# Patient Record
Sex: FEMALE | Race: WHITE | Employment: UNEMPLOYED | URBAN - METROPOLITAN AREA
[De-identification: names, ages, dates, MRNs, and addresses within clinical notes are randomized per-mention and may not be internally consistent; named-entity substitution may affect disease eponyms.]

---

## 2021-01-13 ENCOUNTER — VIRTUAL VISIT (OUTPATIENT)
Dept: FAMILY MEDICINE | Facility: OTHER | Age: 26
End: 2021-01-13

## 2021-01-13 NOTE — PROGRESS NOTES
"Date: 2021 04:24:42  Clinician: Pebbles Whitaker  Clinician NPI: [id number]  Patient: Joey Spears  Patient : 1995  Patient Address: [address], [place] [address]  Patient Phone: [phone]  Visit Protocol: UTI  Patient Summary:  Joey is a 25 year old ( : 1995 ) female who initiated a [facility] Visit for a presumed bladder infection. When asked the question \"Please sign me up to receive news, health information and promotions from [facility].\", Joey responded \"No\".   Her symptoms started 1-3 days ago and consist of urinary frequency, urgency, dysuria, foul-smelling urine, and feeling as if the bladder is never empty.   Symptom details   Urine color: The color of her urine is yellow.    Denied symptoms include flank pain, abdominal pain, chills, urinary incontinence, vomiting, vaginal itching, nausea, and vaginal discharge. She does not feel feverish.   Joey has not used any over-the-counter medications or home remedies to relieve her current symptoms.  Precipitating events  Joey denies having a sexually transmitted disease.  Pertinent medical history  Joey has had a bladder infection before but has not had any in the past 12 months. Her current symptoms are similar to her previous bladder infection symptoms.   She is not sure what antibiotics have been effective in treating her past bladder infections.   Joey typically gets a yeast infection when she takes antibiotics. She is not sure if she has used fluconazole (Diflucan) to treat previous yeast infections.   Joey has not been prescribed antibiotics to prevent frequent or repeated bladder infections in the past. She has not experienced problems or side effects with any of the common antibiotics used to treat bladder infections.   Joey does not have a history of kidney stones. She does not have any other urologic conditions. Her provider has not told her she has advanced kidney disease. She has not used a catheter or " been a patient in a hospital or nursing home in the past 2 weeks.  She denies having immunosuppressive conditions (e.g., chemotherapy, HIV, organ transplant, long-term use of steroids or other immunosuppressive medications, splenectomy). She does not have diabetes.   Joey smokes or uses smokeless tobacco.   She denies pregnancy and denies breastfeeding. She has menstruated in the past month.     MEDICATIONS: No current medications, ALLERGIES: NKDA  Clinician Response:  Dear Joey,  Based on the information you have provided, you likely have an acute urinary tract infection, also called a bladder infection. Bladder infections occur when bacteria from the outside of the body enters the urinary tract. Any part of the urinary system can be infected, but the bladder is the most common.  Medication information  I am prescribing:     Sulfamethoxazole-trimethoprim (Bactrim DS) 800-160 mg oral tablet. Take 1 tablet by mouth every 12 hours for 3 days. There are no refills with this prescription.   Certain antibiotics such as Bactrim and Ciprofloxacin can cause your skin to be more sensitive to the sun. Exposure to the sun when taking these antibiotics may cause skin rash, itching, redness, or a severe sunburn. Be sure to avoid prolonged or direct exposure to the sun, especially between 10 am and 3 pm, if possible. Wear protective clothing and use sunscreen of SPF 30 or higher when you are outdoors.  The medication I prescribed for your bladder infection is an antibiotic. Continue taking the medication until it is gone even if you feel better. If you get an upset stomach while taking antibiotics, taking the medication with food can help.   Because you usually get a yeast infection when taking antibiotics, I am also prescribing:     Fluconazole (Diflucan) 150 mg oral tablet. Take 1 tablet by mouth 1 time a day for 1 day. There are no refills with this prescription.   Self care  Urination helps to flush bacteria from the  urinary tract. For this reason, drinking water and urinating often helps relieve some urinary symptoms and can decrease your risk of getting bladder infections in the future.  Other steps you can take to prevent future bladder infections include:     Wipe front to back after using the bathroom    Urinate after sexual intercourse    Avoid using deodorant sprays, douches, or powders in the vaginal area     Becoming tobacco-free is one of the best things you can do to improve your health. For help with quitting tobacco, you can call 7-130-QUIT-NOW (1-949.485.3818) or visit [employer].  When to seek care  Please make an appointment to be seen in a clinic or urgent care if any of the following occur:     You develop new symptoms or your symptoms become worse    You have medication side effects that make it difficult to take them as prescribed    Your symptoms do not improve within 1-2 days of starting treatment    You have symptoms of a bladder infection that return shortly after completing treatment     It is possible to have an allergic reaction to an antibiotic even if you have not had one in the past. If you notice a new rash, significant swelling, or difficulty breathing, stop taking this medication immediately and go to a clinic or urgent care.   Diagnosis: Acute uncomplicated bladder infection  Diagnosis ICD: N39.0  Prescription: sulfamethoxazole-trimethoprim (Bactrim DS) 800-160 mg oral tablet 6 tablet, 3 days supply. Take 1 tablet by mouth every 12 hours for 3 days. Refills: 0, Refill as needed: no, Allow substitutions: yes  Prescription: fluconazole (Diflucan) 150 mg oral tablet 1 tablet, 1 days supply. Take 1 tablet by mouth 1 time a day for 1 day. Refills: 0, Refill as needed: no, Allow substitutions: yes  Pharmacy: [id number] - [phone] - [address], [place] [address]

## 2021-01-15 ENCOUNTER — HEALTH MAINTENANCE LETTER (OUTPATIENT)
Age: 26
End: 2021-01-15

## 2021-02-05 ASSESSMENT — ENCOUNTER SYMPTOMS
HEMATURIA: 0
DYSURIA: 0
JOINT SWELLING: 0
NAUSEA: 0
PALPITATIONS: 0
NERVOUS/ANXIOUS: 1
SHORTNESS OF BREATH: 0
CHILLS: 0
FEVER: 0
HEADACHES: 1
HEMATOCHEZIA: 0
EYE PAIN: 0
MYALGIAS: 0
DIARRHEA: 0
SORE THROAT: 0
BREAST MASS: 0
PARESTHESIAS: 0
ABDOMINAL PAIN: 0
DIZZINESS: 0
HEARTBURN: 0
ARTHRALGIAS: 0
FREQUENCY: 0
COUGH: 0
WEAKNESS: 0
CONSTIPATION: 0

## 2021-02-05 ASSESSMENT — PATIENT HEALTH QUESTIONNAIRE - PHQ9
SUM OF ALL RESPONSES TO PHQ QUESTIONS 1-9: 18
SUM OF ALL RESPONSES TO PHQ QUESTIONS 1-9: 18
10. IF YOU CHECKED OFF ANY PROBLEMS, HOW DIFFICULT HAVE THESE PROBLEMS MADE IT FOR YOU TO DO YOUR WORK, TAKE CARE OF THINGS AT HOME, OR GET ALONG WITH OTHER PEOPLE: EXTREMELY DIFFICULT

## 2021-02-06 ASSESSMENT — PATIENT HEALTH QUESTIONNAIRE - PHQ9: SUM OF ALL RESPONSES TO PHQ QUESTIONS 1-9: 18

## 2021-02-08 ENCOUNTER — OFFICE VISIT (OUTPATIENT)
Dept: FAMILY MEDICINE | Facility: CLINIC | Age: 26
End: 2021-02-08
Payer: COMMERCIAL

## 2021-02-08 VITALS
TEMPERATURE: 98.3 F | WEIGHT: 155 LBS | BODY MASS INDEX: 26.46 KG/M2 | HEART RATE: 79 BPM | SYSTOLIC BLOOD PRESSURE: 106 MMHG | HEIGHT: 64 IN | DIASTOLIC BLOOD PRESSURE: 69 MMHG | OXYGEN SATURATION: 100 %

## 2021-02-08 DIAGNOSIS — Z00.00 ENCOUNTER FOR ROUTINE ADULT HEALTH EXAMINATION WITHOUT ABNORMAL FINDINGS: Primary | ICD-10-CM

## 2021-02-08 DIAGNOSIS — F41.9 ANXIETY: ICD-10-CM

## 2021-02-08 DIAGNOSIS — F32.A DEPRESSION, UNSPECIFIED DEPRESSION TYPE: ICD-10-CM

## 2021-02-08 PROCEDURE — G0145 SCR C/V CYTO,THINLAYER,RESCR: HCPCS | Performed by: FAMILY MEDICINE

## 2021-02-08 PROCEDURE — 90686 IIV4 VACC NO PRSV 0.5 ML IM: CPT | Performed by: FAMILY MEDICINE

## 2021-02-08 PROCEDURE — 86803 HEPATITIS C AB TEST: CPT | Performed by: FAMILY MEDICINE

## 2021-02-08 PROCEDURE — 99395 PREV VISIT EST AGE 18-39: CPT | Mod: 25 | Performed by: FAMILY MEDICINE

## 2021-02-08 PROCEDURE — 90471 IMMUNIZATION ADMIN: CPT | Performed by: FAMILY MEDICINE

## 2021-02-08 PROCEDURE — 36415 COLL VENOUS BLD VENIPUNCTURE: CPT | Performed by: FAMILY MEDICINE

## 2021-02-08 PROCEDURE — 99214 OFFICE O/P EST MOD 30 MIN: CPT | Mod: 25 | Performed by: FAMILY MEDICINE

## 2021-02-08 RX ORDER — COPPER 313.4 MG/1
1 INTRAUTERINE DEVICE INTRAUTERINE
COMMUNITY
Start: 2018-11-13 | End: 2028-11-10

## 2021-02-08 RX ORDER — BUPROPION HYDROCHLORIDE 150 MG/1
150 TABLET ORAL EVERY MORNING
Qty: 30 TABLET | Refills: 1 | Status: SHIPPED | OUTPATIENT
Start: 2021-02-08 | End: 2021-03-02 | Stop reason: DRUGHIGH

## 2021-02-08 ASSESSMENT — ENCOUNTER SYMPTOMS
DIARRHEA: 0
FEVER: 0
NERVOUS/ANXIOUS: 1
CONSTIPATION: 0
NAUSEA: 0
DIZZINESS: 0
PALPITATIONS: 0
ARTHRALGIAS: 0
HEADACHES: 1
CHILLS: 0
EYE PAIN: 0
SORE THROAT: 0
WEAKNESS: 0
HEMATOCHEZIA: 0
COUGH: 0
HEARTBURN: 0
SHORTNESS OF BREATH: 0
JOINT SWELLING: 0
ABDOMINAL PAIN: 0
HEMATURIA: 0
DYSURIA: 0
MYALGIAS: 0
FREQUENCY: 0
BREAST MASS: 0
PARESTHESIAS: 0

## 2021-02-08 ASSESSMENT — MIFFLIN-ST. JEOR: SCORE: 1415.14

## 2021-02-08 NOTE — PROGRESS NOTES
SUBJECTIVE:   CC: Joey Spears is an 25 year old woman who presents for preventive health visit.     Patient has been advised of split billing requirements and indicates understanding: Yes  Healthy Habits:     Getting at least 3 servings of Calcium per day:  Yes    Bi-annual eye exam:  Yes    Dental care twice a year:  NO    Sleep apnea or symptoms of sleep apnea:  None    Diet:  Carbohydrate counting and Breakfast skipped    Frequency of exercise:  4-5 days/week    Duration of exercise:  45-60 minutes    Taking medications regularly:  Yes    Medication side effects:  Not applicable    PHQ-2 Total Score: 6    Additional concerns today:  No      Answers for HPI/ROS submitted by the patient on 2/5/2021   Annual Exam:  If you checked off any problems, how difficult have these problems made it for you to do your work, take care of things at home, or get along with other people?: Extremely difficult  PHQ9 TOTAL SCORE: 18    History of depression. Was in partial hospitalization with suicidal thoughts (no history of attempt) when she was about 14 years old right after starting Prozac. Was eventually started on wellbutrin, which has been effective for her mom and that worked well. Has been off of the medicine and managing well for the past couple of years, but has felt her mood worsening on and off and over several months. 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 She finds that weighs on her. Also thinking of starting to see a therapist. Would like to get back on Wellbutrin. Thinks she was on 200 or 300mg at one point. Tolerated it well and found it effective. Feeling quite down lately and is having a hard time managing her mood.     Has one male partner. Denies concern for STI. Has paragard IUD in place, which is working well for her. Periods start heavy x 2 days and then are over quickly.     Denies history of seizures or  eating disorder.       Today's PHQ-2 Score:   PHQ-2 (  [employer]) 2021   Q1: Little interest or pleasure in doing things 3   Q2: Feeling down, depressed or hopeless 3   PHQ-2 Score 6   Q1: Little interest or pleasure in doing things Nearly every day   Q2: Feeling down, depressed or hopeless Nearly every day   PHQ-2 Score 6       Abuse: Current or Past (Physical, Sexual or Emotional) - No  Do you feel safe in your environment? Yes        Social History     Tobacco Use     Smoking status: Former Smoker     Packs/day: 0.50     Years: 10.00     Pack years: 5.00     Types: Cigarettes, Other     Quit date: 2020     Years since quittin.8     Smokeless tobacco: Current User     Tobacco comment: Quit cigarettes- currently using vape   Substance Use Topics     Alcohol use: Yes     If you drink alcohol do you typically have >3 drinks per day or >7 drinks per week? Yes         AUDIT - Alcohol Use Disorders Identification Test - Reproduced from the World Health Organization Audit 2001 (Second Edition) 2021   1.  How often do you have a drink containing alcohol? 2 to 3 times a week   2.  How many drinks containing alcohol do you have on a typical day when you are drinking? 1 or 2   3.  How often do you have five or more drinks on one occasion? Monthly   4.  How often during the last year have you found that you were not able to stop drinking once you had started? Never   5.  How often during the last year have you failed to do what was normally expected of you because of drinking? Never   6.  How often during the last year have you needed a first drink in the morning to get yourself going after a heavy drinking session? Never   7.  How often during the last year have you had a feeling of guilt or remorse after drinking? Never   8.  How often during the last year have you been unable to remember what happened the night before because of your drinking? Less than monthly   9.  Have you or someone else been injured  because of your drinking? No   10. Has a relative, friend, doctor or other health care worker been concerned about your drinking or suggested you cut down? No   TOTAL SCORE 6       Any new diagnosis of family breast, ovarian, or bowel cancer? No      Reviewed orders with patient.  Reviewed health maintenance and updated orders accordingly - Yes            History of abnormal Pap smear: NO - age 21-29 PAP every 3 years recommended     Reviewed and updated as needed this visit by clinical staff  Tobacco  Allergies  Meds              Reviewed and updated as needed this visit by Provider                Past Medical History:   Diagnosis Date     Depressive disorder     Previously medicated      No past surgical history on file.  OB History    Para Term  AB Living   0 0 0 0 0 0   SAB TAB Ectopic Multiple Live Births   0 0 0 0 0       Review of Systems   Constitutional: Negative for chills and fever.   HENT: Negative for congestion, ear pain, hearing loss and sore throat.    Eyes: Negative for pain and visual disturbance.   Respiratory: Negative for cough and shortness of breath.    Cardiovascular: Negative for chest pain, palpitations and peripheral edema.   Gastrointestinal: Negative for abdominal pain, constipation, diarrhea, heartburn, hematochezia and nausea.   Breasts:  Negative for tenderness, breast mass and discharge.   Genitourinary: Negative for dysuria, frequency, genital sores, hematuria, pelvic pain, urgency, vaginal bleeding and vaginal discharge.   Musculoskeletal: Negative for arthralgias, joint swelling and myalgias.   Skin: Negative for rash.   Neurological: Positive for headaches. Negative for dizziness, weakness and paresthesias.   Psychiatric/Behavioral: Positive for mood changes. The patient is nervous/anxious.            OBJECTIVE:   /69 (BP Location: Left arm, Patient Position: Sitting, Cuff Size: Adult Regular)   Pulse 79   Temp 98.3  F (36.8  C) (Temporal)   Ht 1.613  "m (5' 3.5\")   Wt 70.3 kg (155 lb)   LMP 01/18/2021 (Approximate)   SpO2 100%   BMI 27.03 kg/m    Physical Exam  GENERAL: healthy, alert and no distress  EYES: Eyes grossly normal to inspection, PERRL and conjunctivae and sclerae normal  HENT: ear canals and TM's normal   NECK: no adenopathy, no asymmetry, masses, or scars and thyroid normal to palpation  RESP: lungs clear to auscultation - no rales, rhonchi or wheezes  BREAST: normal without masses, tenderness or nipple discharge and no palpable axillary masses or adenopathy  CV: regular rate and rhythm, normal S1 S2, no S3 or S4, no murmur, click or rub, no peripheral edema and peripheral pulses strong  ABDOMEN: soft, nontender, no hepatosplenomegaly, no masses and bowel sounds normal   (female): normal female external genitalia, normal urethral meatus, vaginal mucosa pink, moist, well rugated, and normal cervix   MS: no gross musculoskeletal defects noted, no edema  SKIN: no suspicious lesions or rashes  NEURO: Normal strength and tone, mentation intact and speech normal  PSYCH: mentation appears normal, affect normal/bright    Diagnostic Test Results:  Labs reviewed in Epic    ASSESSMENT/PLAN:       ICD-10-CM    1. Encounter for routine adult health examination without abnormal findings  Z00.00 Pap imaged thin layer screen reflex to HPV if ASCUS - recommend age 25 - 29     Hepatitis C Screen Reflex to HCV RNA Quant and Genotype   2. Anxiety  F41.9 buPROPion (WELLBUTRIN XL) 150 MG 24 hr tablet     MENTAL HEALTH REFERRAL  - Adult; Outpatient Treatment; Individual/Couples/Family/Group Therapy/Health Psychology; Other: [facility] [phone]; We will contact you to schedule the appointment or please call with any questions   3. Depression, unspecified depression type  F32.9 buPROPion (WELLBUTRIN XL) 150 MG 24 hr tablet     MENTAL HEALTH REFERRAL  - Adult; Outpatient Treatment; Individual/Couples/Family/Group Therapy/Health Psychology; Other: [facility] [phone]; We will contact you to schedule the appointment or please call with any questions     Uncontrolled depression and anxiety. Will start wellbutrin 150mg daily for her depression and anxiety, which has worked well for her in the past. She will follow up with me in two weeks and will schedule with Macario Connell in the meantime as well. Referral for ongoing therapy placed today. She will let me know if any concerns arise before she is scheduled to see me.     COUNSELING:  Reviewed preventive health counseling, as reflected in patient instructions    Estimated body mass index is 27.03 kg/m  as calculated from the following:    Height as of this encounter: 1.613 m (5' 3.5\").    Weight as of this encounter: 70.3 kg (155 lb).    Weight management plan: diet and exercise    She reports that she quit smoking about 10 months ago. Her smoking use included cigarettes and other. She has a 5.00 pack-year smoking history. She uses smokeless tobacco.      Counseling Resources:  ATP IV Guidelines  Pooled Cohorts Equation Calculator  Breast Cancer Risk Calculator  BRCA-Related Cancer Risk Assessment: FHS-7 Tool  FRAX Risk Assessment  ICSI Preventive Guidelines  Dietary Guidelines for Americans, 2010  USDA's MyPlate  ASA Prophylaxis  Lung CA Screening    Kaci Villar MD, MD  [facility]  "

## 2021-02-08 NOTE — PATIENT INSTRUCTIONS
Start wellbutrin 150mg daily.   Follow up with me in about 2 weeks. If you are tolerating the wellbutrin, we can discuss increasing the dose further.   Consider scheduling with our behavioral health provider, Macario Connell, for counseling.     Preventive Health Recommendations  Female Ages 26 - 39  Yearly exam:   See your health care provider every year in order to    Review health changes.     Discuss preventive care.      Review your medicines if you your doctor has prescribed any.    Until age 30: Get a Pap test every three years (more often if you have had an abnormal result).    After age 30: Talk to your doctor about whether you should have a Pap test every 3 years or have a Pap test with HPV screening every 5 years.   You do not need a Pap test if your uterus was removed (hysterectomy) and you have not had cancer.  You should be tested each year for STDs (sexually transmitted diseases), if you're at risk.   Talk to your provider about how often to have your cholesterol checked.  If you are at risk for diabetes, you should have a diabetes test (fasting glucose).  Shots: Get a flu shot each year. Get a tetanus shot every 10 years.   Nutrition:     Eat at least 5 servings of fruits and vegetables each day.    Eat whole-grain bread, whole-wheat pasta and brown rice instead of white grains and rice.    Get adequate Calcium and Vitamin D.     Lifestyle    Exercise at least 150 minutes a week (30 minutes a day, 5 days of the week). This will help you control your weight and prevent disease.    Limit alcohol to one drink per day.    No smoking.     Wear sunscreen to prevent skin cancer.    See your dentist every six months for an exam and cleaning.

## 2021-02-09 LAB — HCV AB SERPL QL IA: NONREACTIVE

## 2021-02-09 NOTE — RESULT ENCOUNTER NOTE
Excellent! Please call or sent a [employer] message if you have any questions. Kaci Villar M.D.

## 2021-02-10 LAB
COPATH REPORT: NORMAL
PAP: NORMAL

## 2021-02-15 ENCOUNTER — VIRTUAL VISIT (OUTPATIENT)
Dept: BEHAVIORAL HEALTH | Facility: CLINIC | Age: 26
End: 2021-02-15
Payer: COMMERCIAL

## 2021-02-15 DIAGNOSIS — F33.1 MODERATE EPISODE OF RECURRENT MAJOR DEPRESSIVE DISORDER (H): Primary | ICD-10-CM

## 2021-02-15 PROCEDURE — 90791 PSYCH DIAGNOSTIC EVALUATION: CPT | Mod: 95 | Performed by: SOCIAL WORKER

## 2021-02-15 NOTE — PROGRESS NOTES
"[facility]: Integrated [facility]al Health  Provider Name:  Macario Connell*     Credentials:  [facility]    PATIENT'S NAME: Joey Spears  PREFERRED NAME: giorgio  PRONOUNS:     joss  MRN: [id number]  : 1995  ADDRESS: [address] [place] [address]   ACCT. NUMBER:  [id number]  DATE OF SERVICE: 2/15/21  START TIME: *1100am  END TIME: 1200am  PREFERRED PHONE: *see epic  May we leave a program related message: Yes  SERVICE MODALITY:  Video Visit:      Provider verified identity through the following two step process.  Patient provided:  Patient photo and Patient     Telemedicine Visit: The patient's condition can be safely assessed and treated via synchronous audio and visual telemedicine encounter.      Reason for Telemedicine Visit: Services only offered telehealth    Originating Site (Patient Location): Patient's home    Distant Site (Provider Location): Provider Remote Setting    Consent:  The patient/guardian has verbally consented to: the potential risks and benefits of telemedicine (video visit) versus in person care; bill my insurance or make self-payment for services provided; and responsibility for payment of non-covered services.     Patient would like the video invitation sent by:  Send to e-mail at: [email or web address]    Mode of Communication:  Video Conference via [facility]    As the provider I attest to compliance with applicable laws and regulations related to telemedicine.    UNIVERSAL ADULT Mental Health DIAGNOSTIC ASSESSMENT      Identifying Information:  Patient is a 25 year old, .  The pronoun use throughout this assessment reflects the patient's chosen pronoun.  Patient was referred for an assessment by self.  Patient attended the session alone.     Chief Complaint:   The reason for seeking services at this time is: \" depression \"   The problem(s) began worse past 6 months. Patient has attempted to resolve these concerns in the past " "through Psychiatry, counseling, day treatment. All as a teenager. No services past 10 years.      Notes from PCP progress note dated February 8, 2021  Annual Exam:  If you checked off any problems, how difficult have these problems made it for you to do your work, take care of things at home, or get along with other people?: Extremely difficult  PHQ9 TOTAL SCORE: 18     History of depression. Was in partial hospitalization with suicidal thoughts (no history of attempt) when she was about 14 years old right after starting Prozac. Was eventually started on wellbutrin, which has been effective for her mom and that worked well. Has been off of the medicine and managing well for the past couple of years, but has felt her mood worsening on and off and over several months. Works as a [employer] at [employer]. Likes her job. 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 She finds that weighs on her. Also thinking of starting to see a therapist. Would like to get back on Wellbutrin. Thinks she was on 200 or 300mg at one point. Tolerated it well and found it effective. Feeling quite down lately and is having a hard time managing her mood    Uncontrolled depression and anxiety. Will start wellbutrin 150mg daily for her depression and anxiety, which has worked well for her in the past. She will follow up with me in two weeks and will schedule with Macario Connell in the meantime as well. Referral for ongoing therapy placed today. She will let me know if any concerns arise before she is scheduled to see me.    Today  Patient reports that she is seeking counseling at the present time to \"deal with issues that I have pushed off for the past 10 years\".  Patient identified several \"unhealthy behaviors\" towards her partner, friends and family members that which she wishes to address.  Patient identified insecurity, difficulty with trusting others and fear of abandonment.  Patient expressed a lot of " "insecurity regarding relationships.  Patient feels she often misinterprets  behaviors from others as rejecting.  Patient shared that she received a text message, she begans to panic but that this person is upset with them and said ruminating about their last interaction.    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  Patient reports harder to focus on self-care as she lacks a social life due to COVID-19.  Patient feels this additional solid to time his opportunities to deflect more on herself.  Patient admits the past 10 years she is able to distract her thoughts.    Patient identified significant mental health history from age [age].  Patient recalls a significant event was her mother suicide attempt when she was 15.  Patient recalls she was driving with her father at the time and received a call from a friend of her mother's.  Patient reports she had called her stepfather who called the police intervened on her mother.  Patient reports shortly thereafter, her mother moved to [place].  Patient recalls up to 89 she had a \"us versus them\" relationship with her mother.    Reflected back to patient that she is in a healthy relationship for the past 2 years, she had a stable job the past 7/2 and 1/2 years.  Discussed with patient mindfulness as alternative intervention.  Reflected back to patient that she is seems have been a lot of \"analysis\" but is avoided addressing the underlying pain.  Patient notes he has a difficulty time sharing her feelings.     Plan    Patient was referred to a therapist who utilizes mindfulness in their practice.      Social/Family History:  Patient reported they grew up in [place], [place].  They were raised by biological parents.  Parents were not together.. Patient reports her mother was 19 and her father was 20 when they had her. Patient reports he lived as roommates and coparent. For a couple years " before they . Patient works there were never . Patient reports at age 6 her mother remarried and had 11 father remarried. Patient reports overall she felt loved and cared for but admits she is defiant to her parents for much of this time.       Patient reported that their childhood was ok.  Patient described their current relationships with family of origin as patient reports he is close to her father but distant with her mother..      The patient describes their cultural background as .  Cultural influences and impact on patient's life structure, values, norms, and healthcare: None reported.  Contextual influences on patient's health include: Societal Factors COVID-19, new relationship.    These factors will be addressed in the Preliminary Treatment plan.  Patient identified their preferred language to be English. Patient reported they does not need the assistance of an  or other support involved in therapy.     Patient reported had no significant delays in developmental tasks.   Patient's highest education level was associate degree / vocational certificate. Patient identified the following learning problems: none reported.  Modifications will not be used to assist communication in therapy.   Patient reports they are  able to understand written materials.    Patient reported the following relationship history patient has been with her current partner for the past 2 years. They have their own apartment together..  Patient's current relationship status is partnered / significant other for 2 years.   Patient identified their sexual orientation as heterosexual.  Patient reported having zero child(prince). Patient identified partner as part of their support system.  Patient identified the quality of these relationships as good. But limited Axis due to COVID-19.    Patient's current living/housing situation involves staying in own home/apartment.  They live with partner and they report  that housing is stable.     Patient is currently employed full time and reports they are able to function appropriately at work.. Patient reports she is worked at the same [employer] for the past 7-1/2 years. Patient has a cosmetology license. Patient reports their finances are obtained through employment.  Patient does not identify finances as a current stressor.      Patient reported that they have not been involved with the legal system.   Patient denies being on probation / parole / under the jurisdiction of the court.    Patient's Strengths and Limitations:  Patient identified the following strengths or resources that will help them succeed in treatment: exercise routine, friends / good social support, insight, intelligence, motivation, sense of humor, strong social skills and work ethic. Things that may interfere with the patient's success in treatment include: COVID-19.     Personal and Family Medical History:   Patient does report a family history of mental health concerns.  Patient reports family history includes Anxiety Disorder in her mother; Depression in her mother; Other Cancer in her maternal grandmother, mother, and paternal grandfather.. Patient mother attempted suicide when she was 15 years old.    Patient does report Mental Health Diagnosis and/or Treatment.  Patient Patient reported the following previous diagnoses which include(s): an Anxiety Disorder and Depression.  Patient reported symptoms began age 6.   Patient has received mental health services in the past: See above    psychiatric Hospitalizations: None.  Patient denies a history of civil commitment.  Currently, patient is receiving other mental health services.  These include primary care provider at [facility].  For follow-up on Dr. Villar for medication management.           Patient has had a physical exam to rule out medical causes for current symptoms.  Date of last physical exam was within the past year. Client was encouraged to  follow up with PCP if symptoms were to develop. The patient has a [facility] Primary Care Provider, who is named No primary care provider on file...  Patient reports no current medical concerns.  Patient denies any issues with pain..   There are not significant appetite / nutritional concerns / weight changes.   Patient does not report a history of head injury / trauma / cognitive impairment.      Patient reports current meds as:   No outpatient medications have been marked as taking for the 2/15/21 encounter (Virtual Visit) with Macario Connell LICSW.       Medication Adherence:  Patient reports taking prescribed medications as prescribed.    Patient Allergies:  No Known Allergies    Medical History:    Past Medical History:   Diagnosis Date     Depressive disorder 2008    Previously medicated         Current Mental Status Exam:   Appearance:  Appropriate    Eye Contact:  Fair   Psychomotor:  Normal       Gait / station:  no problem  Attitude / Demeanor: Cooperative   Speech      Rate / Production: Normal/ Responsive      Volume:  Normal  volume      Language:  no problems  Mood:   Anxious  Sad   Affect:   Flat    Thought Content: Clear   Thought Process: Coherent       Associations: No loosening of associations  Insight:   Fair   Judgment:  Intact   Orientation:  All  Attention/concentration: Good    Rating Scales:    PHQ9:    PHQ-9 SCORE 2/5/2021   PHQ-9 Total Score MyChart 18 (Moderately severe depression)   PHQ-9 Total Score 18   ;    GAD7:  No flowsheet data found.  CGI:     First:No data recorded;    Most recentNo data recorded    Substance Use:  Patient reported no family history of chemical health issues.  Patient has not received substance use disorder and/or gambling treatment in the past.  Patient has not ever been to detox.  Patient is not currently receiving any chemical dependency treatment. Patient reports no history of support group attendance.      Significant Losses / Trauma / Abuse / Neglect  Issues:   Patient did not serve in the .  There are indications or report of significant loss, trauma, abuse or neglect issues related to: Suicide attempt to mother at age 15.  Concerns for possible neglect are not present.     Safety Assessment:   Current Safety Concerns:  Homer Suicide Severity Rating Scale (Short Version)  Homer Suicide Severity Rating (Short Version) 2/15/2021   Over the past 2 weeks have you felt down, depressed, or hopeless? no   Over the past 2 weeks have you had thoughts of killing yourself? no   Have you ever attempted to kill yourself? yes   When did this last happen? more than 6 months ago     Due to time constraints, the full Homer was not completed.  Patient identified significant history of self injures behavior and suicidal thoughts as a teenager but denied any thoughts plans or intent to both the PCP and the [facility].  Current risk level minimal.  [facility] reviewed records from 2010 which are documented below.      Patient denies current homicidal ideation and behaviors.  Patient denies current self-injurious ideation and behaviors.    Patient denied risk behaviors associated with substance use.  Patient denies any high risk behaviors associated with mental health symptoms.  Patient reports the following current concerns for their personal safety: None.  Patient reports there are not  firearms in the house. N/A.       Reviewed epic records from [facility] dated October 6, 2010 Former psychiatrist, Dr. Calhoun  She has a history of cutting behaviors Sept to March, 09. She was at the [facility] program 3-10 for a month and this helped stop the cutting. She has a history of passive suicidal ideation. It hasn't been a problem in the past 3 months and denies any intent or plan. She has had no past attempts.         History of Safety Concerns:  Patient denied a history of homicidal ideation.     Patient denied a history of personal safety concerns.     Patient denied a history of assaultive behaviors.    Patient denied a history of sexual assault behaviors.     Patient denied a history of risk behaviors associated with substance use.  Patient denies any history of high risk behaviors associated with mental health symptoms.  Patient reports the following protective factors: spirituality, forward/future oriented thinking, regular physical activity, secure attachment, living with other people, daily obligations and structured day    Risk Plan:  See Recommendations for Safety and Risk Management Plan    Review of Symptoms per patient report:  Depression: Change in sleep, Lack of interest, Difficulties concentrating, Change in appetite, Low self-worth and Feeling sad, down, or depressed  [place]:  No Symptoms  Psychosis: No Symptoms  Anxiety: Excessive worry and Social anxiety  Panic:  Palpitations and Shortness of breath  Post Traumatic Stress Disorder:  No Symptoms   Eating Disorder: No Symptoms  ADD / ADHD:  No symptoms  Conduct Disorder: No symptoms  Autism Spectrum Disorder: No symptoms  Obsessive Compulsive Disorder: No Symptoms    Patient reports the following compulsive behaviors and treatment history: None.      Diagnostic Criteria:   A) Recurrent episode(s) - symptoms have been present during the same 2-week period and represent a change from previous functioning 5 or more symptoms (required for diagnosis)   - Diminished interest or pleasure in all, or almost all, activities.    - Psychomotor activity retardation.    - Fatigue or loss of energy.    - Feelings of worthlessness or inappropriate guilt.    - Diminished ability to think or concentrate, or indecisiveness.     Functional Status:  Patient reports the following functional impairments: childcare / parenting, relationship(s) and self-care.     WHODAS: No flowsheet data found.  Nonprogrammatic care:  Patient is requesting basic services to address current mental health concerns.    Clinical Summary:  1.  Reason for assessment: Depressed mood.  2. Psychosocial, Cultural and Contextual Factors: COVID-19, unresolved childhood trauma.  3. Principal DSM5 Diagnoses  (Sustained by DSM5 Criteria Listed Above):   296.32 (F33.1) Major Depressive Disorder, Recurrent Episode, Moderate _ and With anxious distress.  4. Other Diagnoses that is relevant to services:   Social anxiety.  5. Provisional Diagnosis: None* .  6. Prognosis: Expect Improvement.  7. Likely consequences of symptoms if not treated: Increased tension in relationship.  8. Client strengths include:  caring, creative, employed, goal-focused, good listener, has a previous history of therapy, insightful, intelligent and motivated .     Recommendations:     1. Plan for Safety and Risk Management:   Recommended that patient call 911 or go to the local ED should there be a change in any of these risk factors..          Report to child / adult protection services was NA.     2. Patient's identified None.     3. Initial Treatment will focus on:    Depressed Mood -    Relational Problems related to: Conflict or difficulties with partner/spouse.     4. Resources/Service Plan:    services are not indicated.   Modifications to assist communication are not indicated.   Additional disability accommodations are not indicated.      5. Collaboration:   Collaboration / coordination of treatment will be initiated with the following  support professionals: primary care physician.      6.  Referrals:   The following referral(s) will be initiated: [facility]. Next Scheduled Appointment: To be scheduled by intake.     A Release of Information has been obtained for the following: None.    7. MAURY:    MAURY:  Discussed the general effects of drugs and alcohol on health and well-being. Provider gave patient printed information about the effects of chemical use on their health and well being. Recommendations: None.     8. Records:   These were reviewed at time  of assessment.   Information in this assessment was obtained from the medical record and  provided by patient who is a good historian.    Patient will have open access to their mental health medical record.        Provider Name/ Credentials:  marine King  February 15, 2021

## 2021-02-22 ASSESSMENT — ANXIETY QUESTIONNAIRES
2. NOT BEING ABLE TO STOP OR CONTROL WORRYING: SEVERAL DAYS
6. BECOMING EASILY ANNOYED OR IRRITABLE: NEARLY EVERY DAY
5. BEING SO RESTLESS THAT IT IS HARD TO SIT STILL: SEVERAL DAYS
7. FEELING AFRAID AS IF SOMETHING AWFUL MIGHT HAPPEN: NOT AT ALL
GAD7 TOTAL SCORE: 10
1. FEELING NERVOUS, ANXIOUS, OR ON EDGE: SEVERAL DAYS
4. TROUBLE RELAXING: NEARLY EVERY DAY
3. WORRYING TOO MUCH ABOUT DIFFERENT THINGS: SEVERAL DAYS

## 2021-02-22 ASSESSMENT — PATIENT HEALTH QUESTIONNAIRE - PHQ9: SUM OF ALL RESPONSES TO PHQ QUESTIONS 1-9: 14

## 2021-03-01 ASSESSMENT — PATIENT HEALTH QUESTIONNAIRE - PHQ9
10. IF YOU CHECKED OFF ANY PROBLEMS, HOW DIFFICULT HAVE THESE PROBLEMS MADE IT FOR YOU TO DO YOUR WORK, TAKE CARE OF THINGS AT HOME, OR GET ALONG WITH OTHER PEOPLE: VERY DIFFICULT
SUM OF ALL RESPONSES TO PHQ QUESTIONS 1-9: 14

## 2021-03-01 ASSESSMENT — ANXIETY QUESTIONNAIRES
7. FEELING AFRAID AS IF SOMETHING AWFUL MIGHT HAPPEN: NOT AT ALL
GAD7 TOTAL SCORE: 10
GAD7 TOTAL SCORE: 10

## 2021-03-02 ENCOUNTER — VIRTUAL VISIT (OUTPATIENT)
Dept: FAMILY MEDICINE | Facility: CLINIC | Age: 26
End: 2021-03-02
Payer: COMMERCIAL

## 2021-03-02 DIAGNOSIS — F41.9 ANXIETY: ICD-10-CM

## 2021-03-02 DIAGNOSIS — F33.1 MODERATE EPISODE OF RECURRENT MAJOR DEPRESSIVE DISORDER (H): Primary | ICD-10-CM

## 2021-03-02 PROCEDURE — 99214 OFFICE O/P EST MOD 30 MIN: CPT | Mod: 95 | Performed by: FAMILY MEDICINE

## 2021-03-02 RX ORDER — BUPROPION HYDROCHLORIDE 300 MG/1
300 TABLET ORAL EVERY MORNING
Qty: 90 TABLET | Refills: 0 | Status: SHIPPED | OUTPATIENT
Start: 2021-03-02 | End: 2021-05-27

## 2021-03-02 RX ORDER — HYDROXYZINE HYDROCHLORIDE 25 MG/1
12.5-5 TABLET, FILM COATED ORAL EVERY 8 HOURS PRN
Qty: 60 TABLET | Refills: 1 | Status: SHIPPED | OUTPATIENT
Start: 2021-03-02 | End: 2021-05-27

## 2021-03-02 ASSESSMENT — ANXIETY QUESTIONNAIRES: GAD7 TOTAL SCORE: 10

## 2021-03-02 ASSESSMENT — PATIENT HEALTH QUESTIONNAIRE - PHQ9: SUM OF ALL RESPONSES TO PHQ QUESTIONS 1-9: 14

## 2021-03-02 NOTE — PATIENT INSTRUCTIONS
Rohan Cook,     I sent the wellbutrin 300mg daily to the pharmacy. You can start the increased dose (300mg daily) right away.     You may try hydroxyzine 12.5mg (half tablet) to full tablet as needed for anxiety. It may make you sleepy. Do not drive after taking it if it makes you sleepy.     I am glad you have a therapy appt scheduled. You can schedule with Macario Connell again in the meantime if you would like as well.    Let me know if you have any questions or concerns.     Set up a virtual appointment with me in about 3-4 weeks.

## 2021-03-02 NOTE — PROGRESS NOTES
Joey is a 25 year old who is being evaluated via a billable video visit.      How would you like to obtain your AVS? MyChart  If the video visit is dropped, the invitation should be resent by: Text to cell phone: [phone] (M)   Will anyone else be joining your video visit? No     Video Start Time: 8:44    Assessment & Plan     Moderate episode of recurrent major depressive disorder (H)  Uncontrolled. Tolerating wellbutrin. Will increase dose to 300mg daily. She has found wellbutrin helpful in the past and her mom also found it effective. She did not respond to fluoxetine when it was started and was hospitalized with suicidal ideation after fluoxetine was started when she was in high school. She is very reluctant to try other medications.   Has therapy appt scheduled in May   - buPROPion (WELLBUTRIN XL) 300 MG 24 hr tablet  Dispense: 90 tablet; Refill: 0    Anxiety  Continues to have anxiety, which generally affects her after work, difficulty controlling her thoughts and unwinding after work. She may try hydroxyzine 12.5mg tid as needed for anxiety. Discussed this can be sedating.    Has therapy appt scheduled in May   - buPROPion (WELLBUTRIN XL) 300 MG 24 hr tablet  Dispense: 90 tablet; Refill: 0  - hydrOXYzine (ATARAX) 25 MG tablet  Dispense: 60 tablet; Refill: 1            Patient Instructions   Hi Joey,     I sent the wellbutrin 300mg daily to the pharmacy. You can start the increased dose (300mg daily) right away.     You may try hydroxyzine 12.5mg (half tablet) to full tablet as needed for anxiety. It may make you sleepy. Do not drive after taking it if it makes you sleepy.     I am glad you have a therapy appt scheduled. You can schedule with Macario Connell again in the meantime if you would like as well.    Let me know if you have any questions or concerns.     Set up a virtual appointment with me in about 3-4 weeks.           Return in about 4 weeks (around 3/30/2021) for with me, using a video  visit.    Kaci Villar MD, MD  [facility] MARCE Cook is a 25 year old who presents for the following health issues   HPI     Answers for HPI/ROS submitted by the patient on 3/1/2021   If you checked off any problems, how difficult have these problems made it for you to do your work, take care of things at home, or get along with other people?: Very difficult  PHQ9 TOTAL SCORE: 14  AYDIN 7 TOTAL SCORE: 10    Depression and Anxiety Follow-Up    How are you doing with your depression since your last visit? No change    How are you doing with your anxiety since your last visit?  No change    Are you having other symptoms that might be associated with depression or anxiety? No    Have you had a significant life event? No     Do you have any concerns with your use of alcohol or other drugs? No    Social History     Tobacco Use     Smoking status: Former Smoker     Packs/day: 0.50     Years: 10.00     Pack years: 5.00     Types: Cigarettes, Other     Quit date: 2020     Years since quittin.9     Smokeless tobacco: Current User     Tobacco comment: Quit cigarettes- currently using vape   Substance Use Topics     Alcohol use: Yes     Drug use: Never     PHQ 2021   PHQ-9 Total Score 18 14 14   Q9: Thoughts of better off dead/self-harm past 2 weeks Not at all Not at all Not at all     AYDIN-7 SCORE 2021   Total Score 10 (moderate anxiety) 10 (moderate anxiety) 10 (moderate anxiety)   Total Score - 10 10     Last PHQ-9 2021   1.  Little interest or pleasure in doing things 1   2.  Feeling down, depressed, or hopeless 1   3.  Trouble falling or staying asleep, or sleeping too much 3   4.  Feeling tired or having little energy 3   5.  Poor appetite or overeating 2   6.  Feeling bad about yourself 1   7.  Trouble concentrating 2   8.  Moving slowly or restless 1   Q9: Thoughts of better off dead/self-harm past 2 weeks 0   PHQ-9  Total Score 14     AYDIN-7  2/22/2021   1. Feeling nervous, anxious, or on edge 1   2. Not being able to stop or control worrying 1   3. Worrying too much about different things 1   4. Trouble relaxing 3   5. Being so restless that it is hard to sit still 1   6. Becoming easily annoyed or irritable 3   7. Feeling afraid, as if something awful might happen 0   AYDIN-7 Total Score 10       Suicide Assessment Five-step Evaluation and Treatment (SAFE-T)       How many servings of fruits and vegetables do you eat daily?  2-3    On average, how many sweetened beverages do you drink each day (Examples: soda, juice, sweet tea, etc.  Do NOT count diet or artificially sweetened beverages)?   0    How many days per week do you exercise enough to make your heart beat faster? 4    How many minutes a day do you exercise enough to make your heart beat faster? 60 or more    How many days per week do you miss taking your medication? 0         Review of Systems          Objective           Vitals:  No vitals were obtained today due to virtual visit.    Physical Exam   GENERAL: Healthy, alert and no distress  EYES: Eyes grossly normal to inspection.  No discharge or erythema, or obvious scleral/conjunctival abnormalities.  RESP: No audible wheeze, cough, or visible cyanosis.  No visible retractions or increased work of breathing.    SKIN: Visible skin clear. No significant rash, abnormal pigmentation or lesions.  NEURO: Cranial nerves grossly intact.  Mentation and speech appropriate for age.  PSYCH: Mentation appears normal, affect normal/bright, judgement and insight intact, normal speech and appearance well-groomed.                  Video-Visit Details    Type of service:  Video Visit    Video End Time:8:53 AM    Originating Location (pt. Location): Home    Distant Location (provider location):  [facility]     Platform used for Video Visit: [employer]iant

## 2021-04-29 ENCOUNTER — VIRTUAL VISIT (OUTPATIENT)
Dept: FAMILY MEDICINE | Facility: CLINIC | Age: 26
End: 2021-04-29
Payer: COMMERCIAL

## 2021-04-29 DIAGNOSIS — F33.1 MODERATE EPISODE OF RECURRENT MAJOR DEPRESSIVE DISORDER (H): ICD-10-CM

## 2021-04-29 DIAGNOSIS — F41.9 ANXIETY: Primary | ICD-10-CM

## 2021-04-29 PROCEDURE — 99214 OFFICE O/P EST MOD 30 MIN: CPT | Mod: 95 | Performed by: FAMILY MEDICINE

## 2021-04-29 RX ORDER — BUSPIRONE HYDROCHLORIDE 5 MG/1
5 TABLET ORAL 2 TIMES DAILY
Qty: 60 TABLET | Refills: 1 | Status: SHIPPED | OUTPATIENT
Start: 2021-04-29 | End: 2021-05-27

## 2021-04-29 ASSESSMENT — ANXIETY QUESTIONNAIRES
GAD7 TOTAL SCORE: 19
5. BEING SO RESTLESS THAT IT IS HARD TO SIT STILL: MORE THAN HALF THE DAYS
6. BECOMING EASILY ANNOYED OR IRRITABLE: NEARLY EVERY DAY
2. NOT BEING ABLE TO STOP OR CONTROL WORRYING: NEARLY EVERY DAY
IF YOU CHECKED OFF ANY PROBLEMS ON THIS QUESTIONNAIRE, HOW DIFFICULT HAVE THESE PROBLEMS MADE IT FOR YOU TO DO YOUR WORK, TAKE CARE OF THINGS AT HOME, OR GET ALONG WITH OTHER PEOPLE: SOMEWHAT DIFFICULT
3. WORRYING TOO MUCH ABOUT DIFFERENT THINGS: NEARLY EVERY DAY
1. FEELING NERVOUS, ANXIOUS, OR ON EDGE: NEARLY EVERY DAY
7. FEELING AFRAID AS IF SOMETHING AWFUL MIGHT HAPPEN: MORE THAN HALF THE DAYS

## 2021-04-29 ASSESSMENT — PATIENT HEALTH QUESTIONNAIRE - PHQ9
SUM OF ALL RESPONSES TO PHQ QUESTIONS 1-9: 11
5. POOR APPETITE OR OVEREATING: NEARLY EVERY DAY

## 2021-04-29 NOTE — PROGRESS NOTES
Joey is a 25 year old who is being evaluated via a billable video visit.      How would you like to obtain your AVS? MyChart  If the video visit is dropped, the invitation should be resent by: Text to cell phone: [phone]  Will anyone else be joining your video visit? No     Video Start Time: 9:32 AM    Assessment & Plan        Moderate episode of recurrent major depressive disorder (H)  Improved with increase in wellbutrin dose to 300mg daily. She wants to continue this dose of medication.     Uncontrolled. Tolerating wellbutrin. Will increase dose to 300mg daily. She has found wellbutrin helpful in the past and her mom also found it effective. She did not respond to fluoxetine when it was started and was hospitalized with suicidal ideation after fluoxetine was started when she was in high school. She is very reluctant to try other medications.   Has therapy appt scheduled in May   - buPROPion (WELLBUTRIN XL) 300 MG 24 hr tablet  Dispense: 90 tablet; Refill: 0     Anxiety  Uncontrolled. Increased since last visit, possibly secondary to increase in wellbutrin dose, which she does not want to change at this time. She did not find hydroxyzine helpful and noticed she would wake up groggy after taking it in the evening. Will start buspar 5mg twice daily and increase to 10mg twice daily after two weeks with plan to follow up in two weeks. Discussed risks/side effects/benefits of the medication.     Continues to have anxiety, which generally affects her after work, difficulty controlling her thoughts and unwinding after work. She may try hydroxyzine 12.5mg tid as needed for anxiety. Discussed this can be sedating.    Has therapy appt scheduled in May   - buPROPion (WELLBUTRIN XL) 300 MG 24 hr tablet  Dispense: 90 tablet; Refill: 0  - hydrOXYzine (ATARAX) 25 MG tablet  Dispense: 60 tablet; Refill: 1              Return in about 2 weeks (around 5/13/2021).    Kaci Villar MD, MD [facility]  MARCE Cook is a 25 year old who presents for the following health issues     HPI     Depression and Anxiety Follow-Up    How are you doing with your depression since your last visit? Improved      How are you doing with your anxiety since your last visit?  Worsened      Are you having other symptoms that might be associated with depression or anxiety? No    Have you had a significant life event? No     Do you have any concerns with your use of alcohol or other drugs? No    Social History     Tobacco Use     Smoking status: Former Smoker     Packs/day: 0.50     Years: 10.00     Pack years: 5.00     Types: Cigarettes, Other     Quit date: 2020     Years since quittin.0     Smokeless tobacco: Current User     Tobacco comment: Quit cigarettes- currently using vape   Substance Use Topics     Alcohol use: Yes     Drug use: Never     PHQ 2021   PHQ-9 Total Score 14 14 11   Q9: Thoughts of better off dead/self-harm past 2 weeks Not at all Not at all Not at all     AYDIN-7 SCORE 2021   Total Score 10 (moderate anxiety) 10 (moderate anxiety) -   Total Score 10 10 19     Last PHQ-9 2021   1.  Little interest or pleasure in doing things 1   2.  Feeling down, depressed, or hopeless 1   3.  Trouble falling or staying asleep, or sleeping too much 3   4.  Feeling tired or having little energy 2   5.  Poor appetite or overeating 2   6.  Feeling bad about yourself 0   7.  Trouble concentrating 2   8.  Moving slowly or restless 0   Q9: Thoughts of better off dead/self-harm past 2 weeks 0   PHQ-9 Total Score 11   Difficulty at work, home, or with people Somewhat difficult     AYDIN-7  2021   1. Feeling nervous, anxious, or on edge 3   2. Not being able to stop or control worrying 3   3. Worrying too much about different things 3   4. Trouble relaxing 3   5. Being so restless that it is hard to sit still 2   6. Becoming easily annoyed or irritable 3   7.  "Feeling afraid, as if something awful might happen 2   AYDIN-7 Total Score 19   If you checked any problems, how difficult have they made it for you to do your work, take care of things at home, or get along with other people? Somewhat difficult       Suicide Assessment Five-step Evaluation and Treatment (SAFE-T)           How many servings of fruits and vegetables do you eat daily?  4 or more    On average, how many sweetened beverages do you drink each day (Examples: soda, juice, sweet tea, etc.  Do NOT count diet or artificially sweetened beverages)?   1    How many days per week do you exercise enough to make your heart beat faster? 3 or less    How many minutes a day do you exercise enough to make your heart beat faster? 60 or more  How many days per week do you miss taking your medication? 1    What makes it hard for you to take your medications?  remembering to take    She works as a . Wants to make sure any med she takes will not affect her function at work.     Review of Systems          Objective    Vitals - Patient Reported  Weight (Patient Reported): 68 kg (150 lb)  Height (Patient Reported): 160 cm (5' 3\")  BMI (Based on Pt Reported Ht/Wt): 26.57      Vitals:  No vitals were obtained today due to virtual visit.    Physical Exam   GENERAL: Healthy, alert and no distress  EYES: Eyes grossly normal to inspection.  No discharge or erythema, or obvious scleral/conjunctival abnormalities.  RESP: No audible wheeze, cough, or visible cyanosis.  No visible retractions or increased work of breathing.    SKIN: Visible skin clear. No significant rash, abnormal pigmentation or lesions.  NEURO: Cranial nerves grossly intact.  Mentation and speech appropriate for age.  PSYCH: Mentation appears normal, affect normal/bright, judgement and insight intact, normal speech and appearance well-groomed.                  Video-Visit Details    Type of service:  Video Visit    Video End Time:9:44    Originating " Location (pt. Location): Home    Distant Location (provider location):  [facility]     Platform used for Video Visit: DeepikaWell

## 2021-04-29 NOTE — PATIENT INSTRUCTIONS
1) Start buspar 5mg twice daily.   2) If you are tolerating it well, you can try increasing to 10mg twice daily in a week.   3) Follow up with me virtually in two weeks.

## 2021-04-30 ASSESSMENT — ANXIETY QUESTIONNAIRES: GAD7 TOTAL SCORE: 19

## 2021-05-03 ENCOUNTER — VIRTUAL VISIT (OUTPATIENT)
Dept: PSYCHOLOGY | Facility: CLINIC | Age: 26
End: 2021-05-03
Payer: COMMERCIAL

## 2021-05-03 DIAGNOSIS — F33.1 MODERATE EPISODE OF RECURRENT MAJOR DEPRESSIVE DISORDER (H): Primary | ICD-10-CM

## 2021-05-03 PROCEDURE — 90837 PSYTX W PT 60 MINUTES: CPT | Mod: 95

## 2021-05-03 ASSESSMENT — ANXIETY QUESTIONNAIRES
7. FEELING AFRAID AS IF SOMETHING AWFUL MIGHT HAPPEN: SEVERAL DAYS
GAD7 TOTAL SCORE: 14
GAD7 TOTAL SCORE: 14
3. WORRYING TOO MUCH ABOUT DIFFERENT THINGS: SEVERAL DAYS
6. BECOMING EASILY ANNOYED OR IRRITABLE: NEARLY EVERY DAY
2. NOT BEING ABLE TO STOP OR CONTROL WORRYING: SEVERAL DAYS
GAD7 TOTAL SCORE: 14
1. FEELING NERVOUS, ANXIOUS, OR ON EDGE: MORE THAN HALF THE DAYS
7. FEELING AFRAID AS IF SOMETHING AWFUL MIGHT HAPPEN: SEVERAL DAYS
4. TROUBLE RELAXING: NEARLY EVERY DAY
5. BEING SO RESTLESS THAT IT IS HARD TO SIT STILL: NEARLY EVERY DAY

## 2021-05-03 ASSESSMENT — PATIENT HEALTH QUESTIONNAIRE - PHQ9
10. IF YOU CHECKED OFF ANY PROBLEMS, HOW DIFFICULT HAVE THESE PROBLEMS MADE IT FOR YOU TO DO YOUR WORK, TAKE CARE OF THINGS AT HOME, OR GET ALONG WITH OTHER PEOPLE: SOMEWHAT DIFFICULT
SUM OF ALL RESPONSES TO PHQ QUESTIONS 1-9: 10
SUM OF ALL RESPONSES TO PHQ QUESTIONS 1-9: 10

## 2021-05-03 NOTE — PROGRESS NOTES
Progress Note    Patient Name: Joey Spears  Date: 5/3/2021         Service Type: Individual      Session Start Time: 1:30 pm  Session End Time: 2:30 pm     Session Length: 60 mins    Session #: 1  (DA completed by Macario Connell)    Attendees: Client attended alone    Service Modality:  Video Visit:      Provider verified identity through the following two step process.  Patient provided:  Patient address    Telemedicine Visit: The patient's condition can be safely assessed and treated via synchronous audio and visual telemedicine encounter.      Reason for Telemedicine Visit: Services only offered telehealth    Originating Site (Patient Location): Patient's home    Distant Site (Provider Location): Provider Remote Setting    Consent:  The patient/guardian has verbally consented to: the potential risks and benefits of telemedicine (video visit) versus in person care; bill my insurance or make self-payment for services provided; and responsibility for payment of non-covered services.     Patient would like the video invitation sent by:  My Chart    Mode of Communication:  Video Conference via Amwell    As the provider I attest to compliance with applicable laws and regulations related to telemedicine.     Treatment Plan Last Reviewed: 5/3/21  PHQ-9 / AYDIN-7 : Answers for HPI/ROS submitted by the patient on 5/3/2021   If you checked off any problems, how difficult have these problems made it for you to do your work, take care of things at home, or get along with other people?: Somewhat difficult  PHQ9 TOTAL SCORE: 10  AYDIN 7 TOTAL SCORE: 14      DATA  Interactive Complexity: No  Crisis: No       Progress Since Last Session (Related to Symptoms / Goals / Homework):   Symptoms: No change feeling generally not awesome. Working to be more aware of anxiety/depression in my life.      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      Treatment Objective(s) Addressed in This Session:   practice one mindfulness skill each day for 3 minutes  identify 1 sleep hygiene practices     Intervention:   CBT: Discussed mindfulness and benefits for anxiety reduction and mood improvement. Identified opportunity to engage mindfully with alcohol and cafffeine consumption, noting impact of each on sleep and functioning   Healthy coping skills: weightlifting, creative arts, time with family. Stopped smoking last summer. Less healthy: Alcohol consumption-drinking more days than not-a beer or 3 after work.     ASSESSMENT: Current Emotional / Mental Status (status of significant symptoms):   Risk status (Self / Other harm or suicidal ideation)   Patient denies current fears or concerns for personal safety.   Patient denies current or recent suicidal ideation or behaviors.   Patient denies current or recent homicidal ideation or behaviors.   Patient denies current or recent self injurious behavior or ideation.   Patient denies other safety concerns.   Patient reports there has been no change in risk factors since their last session.     Patient reports there has been no change in protective factors since their last session.     Recommended that patient call 911 or go to the local ED should there be a change in any of these risk factors.     Appearance:   Appropriate    Eye Contact:   Good    Psychomotor Behavior: Normal    Attitude:   Cooperative  Pleasant Riccardo   Orientation:   All   Speech    Rate / Production: Normal     Volume:  Normal    Mood:    Anxious  Depressed    Affect:    Appropriate    Thought Content:  Clear    Thought Form:  Coherent  Logical    Insight:    Good       Medication Review:   Changes to psychiatric medications, see updated Medication List in EPIC.      Medication Compliance:   Yes, introducing new anxiety med     Changes in Health Issues:   None reported     Chemical Use Review:   Substance Use: Chemical use reviewed, no active concerns identified      Tobacco Use: No current tobacco use.  Quit last summer.    Diagnosis:  1. Moderate episode of recurrent major depressive disorder (H)        Collateral Reports Completed:   Not Applicable    PLAN: (Patient Tasks / Therapist Tasks / Other)    Be mindful of caffeine consumption timing and sleep impact; likewise pay attention to alcohol consumption and mood/anxiety impact.      BONG Walden  This note has been reviewed and I agree with the plan of care. This note is co-signed by DEE Chin, [facility], Supervisor, on: 5/3/21

## 2021-05-04 ASSESSMENT — ANXIETY QUESTIONNAIRES: GAD7 TOTAL SCORE: 14

## 2021-05-04 ASSESSMENT — PATIENT HEALTH QUESTIONNAIRE - PHQ9: SUM OF ALL RESPONSES TO PHQ QUESTIONS 1-9: 10

## 2021-05-10 ENCOUNTER — VIRTUAL VISIT (OUTPATIENT)
Dept: PSYCHOLOGY | Facility: CLINIC | Age: 26
End: 2021-05-10
Payer: COMMERCIAL

## 2021-05-10 DIAGNOSIS — F33.1 MODERATE EPISODE OF RECURRENT MAJOR DEPRESSIVE DISORDER (H): Primary | ICD-10-CM

## 2021-05-10 PROCEDURE — 90834 PSYTX W PT 45 MINUTES: CPT | Mod: 95

## 2021-05-10 ASSESSMENT — COLUMBIA-SUICIDE SEVERITY RATING SCALE - C-SSRS
1. IN THE PAST MONTH, HAVE YOU WISHED YOU WERE DEAD OR WISHED YOU COULD GO TO SLEEP AND NOT WAKE UP?: YES
2. HAVE YOU ACTUALLY HAD ANY THOUGHTS OF KILLING YOURSELF?: NO
1. IN THE PAST MONTH, HAVE YOU WISHED YOU WERE DEAD OR WISHED YOU COULD GO TO SLEEP AND NOT WAKE UP?: NO
5. HAVE YOU STARTED TO WORK OUT OR WORKED OUT THE DETAILS OF HOW TO KILL YOURSELF? DO YOU INTEND TO CARRY OUT THIS PLAN?: NO
5. HAVE YOU STARTED TO WORK OUT OR WORKED OUT THE DETAILS OF HOW TO KILL YOURSELF? DO YOU INTEND TO CARRY OUT THIS PLAN?: NO
4. HAVE YOU HAD THESE THOUGHTS AND HAD SOME INTENTION OF ACTING ON THEM?: YES
4. HAVE YOU HAD THESE THOUGHTS AND HAD SOME INTENTION OF ACTING ON THEM?: NO
2. HAVE YOU ACTUALLY HAD ANY THOUGHTS OF KILLING YOURSELF LIFETIME?: YES
3. HAVE YOU BEEN THINKING ABOUT HOW YOU MIGHT KILL YOURSELF?: YES

## 2021-05-10 NOTE — PROGRESS NOTES
Progress Note    Patient Name: Joey Spears  Date: 5/10/21          Service Type: Individual      Session Start Time: 10:30 am Session End Time: 11:15 am     Session Length: 45    Session #: 2    Attendees: Client attended alone    Service Modality:  Video Visit:      Provider verified identity through the following two step process.  Patient provided:  Patient is known previously to provider    Telemedicine Visit: The patient's condition can be safely assessed and treated via synchronous audio and visual telemedicine encounter.      Reason for Telemedicine Visit: Services only offered telehealth    Originating Site (Patient Location): Patient's home    Distant Site (Provider Location): Provider Remote Setting    Consent:  The patient/guardian has verbally consented to: the potential risks and benefits of telemedicine (video visit) versus in person care; bill my insurance or make self-payment for services provided; and responsibility for payment of non-covered services.     Patient would like the video invitation sent by:  My Chart    Mode of Communication:  Video Conference via Amwell    As the provider I attest to compliance with applicable laws and regulations related to telemedicine.     Treatment Plan Last Reviewed:   PHQ-9 / AYDNI-7 :     DATA  Interactive Complexity: No  Crisis: No       Progress Since Last Session (Related to Symptoms / Goals / Homework):   Symptoms: No change second session    Homework: Partially completed using exercise to support mood      Episode of Care Goals: Minimal progress - PRECONTEMPLATION (Not seeing need for change); Intervened by educating the patient about the effects of current behavior on health.  Evoked information about reasons to continue behavior, express concern / recommendations, and explored any change talk     Current / Ongoing Stressors and Concerns:   Current: Preparing for mom's visit from [place]  Objective(s) Addressed in This Session:   Decrease frequency and intensity of feeling down, depressed, hopeless       Intervention:   DBT: Introduced emotion regulation concept, practiced understanding/naming emotions, identified their utility in life. Applied to relationship history with mother. Processed emotional experiencing of being parentfied as a source of anger, frustration, embarrassment, disappointment.        ASSESSMENT: Current Emotional / Mental Status (status of significant symptoms):   Risk status (Self / Other harm or suicidal ideation)   Patient denies current fears or concerns for personal safety.   Patient denies current or recent suicidal ideation or behaviors.   Patient denies current or recent homicidal ideation or behaviors.   Patient denies current or recent self injurious behavior or ideation.   Patient denies other safety concerns.   Patient reports there has been no change in risk factors since their last session.     Patient reports there has been no change in protective factors since their last session.     Recommended that patient call 911 or go to the local ED should there be a change in any of these risk factors.     Appearance:   Appropriate    Eye Contact:   Good    Psychomotor Behavior: Normal    Attitude:   Cooperative  Interested   Orientation:   All   Speech    Rate / Production: Normal/ Responsive    Volume:  Normal    Mood:    Depressed    Affect:    Subdued    Thought Content:  Clear    Thought Form:  Coherent  Logical    Insight:    Good      Medication Review:   No changes to current psychiatric medication(s)     Medication Compliance:   Yes     Changes in Health Issues:   None reported     Chemical Use Review:   Substance Use: Chemical use reviewed, no active concerns identified      Tobacco Use: No change in amount of tobacco use since last session.  Patient declined discussion at this time    Diagnosis:  1. Moderate episode of recurrent major depressive disorder (H)         Collateral Reports Completed:   Not Applicable    PLAN: (Patient Tasks / Therapist Tasks / Other)  Continue self care via exercise. Be mindful of emotions around upcoming visit from mom and remember that all emotions are valid.         BONG Walden 5/11/2021                  This note has been reviewed and I agree with the plan of care. This note is co-signed by DEE Chin, [facility], Supervisor, on: 5/12/21

## 2021-05-17 ENCOUNTER — VIRTUAL VISIT (OUTPATIENT)
Dept: PSYCHOLOGY | Facility: CLINIC | Age: 26
End: 2021-05-17
Payer: COMMERCIAL

## 2021-05-17 DIAGNOSIS — F33.1 MODERATE EPISODE OF RECURRENT MAJOR DEPRESSIVE DISORDER (H): Primary | ICD-10-CM

## 2021-05-17 PROCEDURE — 90834 PSYTX W PT 45 MINUTES: CPT | Mod: GT

## 2021-05-17 NOTE — PROGRESS NOTES
Progress Note    Patient Name: Joey Spears  Date: 5/17/21          Service Type: Individual      Session Start Time: 10:30 am Session End Time: 11:15 am     Session Length: 45    Session #: 3    Attendees: Client attended alone    Service Modality:  Video Visit:      Provider verified identity through the following two step process.  Patient provided:  Patient is known previously to provider    Telemedicine Visit: The patient's condition can be safely assessed and treated via synchronous audio and visual telemedicine encounter.      Reason for Telemedicine Visit: Services only offered telehealth    Originating Site (Patient Location): Patient's home    Distant Site (Provider Location): Provider Remote Setting    Consent:  The patient/guardian has verbally consented to: the potential risks and benefits of telemedicine (video visit) versus in person care; bill my insurance or make self-payment for services provided; and responsibility for payment of non-covered services.     Patient would like the video invitation sent by:  My Chart    Mode of Communication:  Video Conference via Amwell    As the provider I attest to compliance with applicable laws and regulations related to telemedicine.     Treatment Plan Last Reviewed:   PHQ-9 / AYDIN-7 :     DATA  Interactive Complexity: No  Crisis: No       Progress Since Last Session (Related to Symptoms / Goals / Homework):   Symptoms: Improving adapting to medication, enjoying being more connected to family and friends, improving energy    Homework: Partially completed using exercise to support mood      Episode of Care Goals: Satisfactory progress - ACTION (Actively working towards change); Intervened by reinforcing change plan / affirming steps taken     Current / Ongoing Stressors and Concerns:   Current: Preparing for mom's visit from [place]     Treatment Objective(s) Addressed in This Session:   Decrease frequency and  intensity of feeling down, depressed, hopeless     Intervention:   DBT: Taught PATRIZIA and reviewed client success with FAST in boundary setting. Applied to relationship history with mother. Processed emotional experiencing of being parentfied as a source of anger, frustration, embarrassment, disappointment.        ASSESSMENT: Current Emotional / Mental Status (status of significant symptoms):   Risk status (Self / Other harm or suicidal ideation)   Patient denies current fears or concerns for personal safety.   Patient denies current or recent suicidal ideation or behaviors.   Patient denies current or recent homicidal ideation or behaviors.   Patient denies current or recent self injurious behavior or ideation.   Patient denies other safety concerns.   Patient reports there has been no change in risk factors since their last session.     Patient reports there has been no change in protective factors since their last session.     Recommended that patient call 911 or go to the local ED should there be a change in any of these risk factors.     Appearance:   Appropriate    Eye Contact:   Good    Psychomotor Behavior: Normal    Attitude:   Cooperative  Interested   Orientation:   All   Speech    Rate / Production: Normal/ Responsive    Volume:  Normal    Mood:    Depressed    Affect:    Subdued    Thought Content:  Clear    Thought Form:  Coherent  Logical    Insight:    Good      Medication Review:   No changes to current psychiatric medication(s)     Medication Compliance:   Yes     Changes in Health Issues:   None reported     Chemical Use Review:   Substance Use: Chemical use reviewed, no active concerns identified      Tobacco Use: No change in amount of tobacco use since last session.  Patient declined discussion at this time    Diagnosis:  1. Moderate episode of recurrent major depressive disorder (H)        Collateral Reports Completed:   Not Applicable    PLAN: (Patient Tasks / Therapist Tasks /  Other)  Continue self care via exercise. Be mindful of emotions around upcoming visit from mom and remember that all emotions are valid. Use PATRIZIA, FAST and Check the Facts.        BONG Walden 5/17/2021           This note has been reviewed and I agree with the plan of care. This note is co-signed by DEE Chin, [facility], Supervisor, on: 5/25/21

## 2021-05-27 ENCOUNTER — VIRTUAL VISIT (OUTPATIENT)
Dept: FAMILY MEDICINE | Facility: CLINIC | Age: 26
End: 2021-05-27
Payer: COMMERCIAL

## 2021-05-27 DIAGNOSIS — F33.1 MODERATE EPISODE OF RECURRENT MAJOR DEPRESSIVE DISORDER (H): Primary | ICD-10-CM

## 2021-05-27 DIAGNOSIS — F41.9 ANXIETY: ICD-10-CM

## 2021-05-27 PROCEDURE — 99214 OFFICE O/P EST MOD 30 MIN: CPT | Mod: 95 | Performed by: FAMILY MEDICINE

## 2021-05-27 RX ORDER — BUPROPION HYDROCHLORIDE 300 MG/1
300 TABLET ORAL EVERY MORNING
Qty: 90 TABLET | Refills: 0 | Status: SHIPPED | OUTPATIENT
Start: 2021-05-27 | End: 2021-08-19

## 2021-05-27 RX ORDER — BUSPIRONE HYDROCHLORIDE 10 MG/1
10 TABLET ORAL 2 TIMES DAILY
Qty: 180 TABLET | Refills: 1 | Status: SHIPPED | OUTPATIENT
Start: 2021-05-27 | End: 2021-08-19

## 2021-05-27 ASSESSMENT — ANXIETY QUESTIONNAIRES
GAD7 TOTAL SCORE: 7
GAD7 TOTAL SCORE: 7
4. TROUBLE RELAXING: SEVERAL DAYS
6. BECOMING EASILY ANNOYED OR IRRITABLE: NEARLY EVERY DAY
5. BEING SO RESTLESS THAT IT IS HARD TO SIT STILL: SEVERAL DAYS
7. FEELING AFRAID AS IF SOMETHING AWFUL MIGHT HAPPEN: NOT AT ALL
GAD7 TOTAL SCORE: 7
7. FEELING AFRAID AS IF SOMETHING AWFUL MIGHT HAPPEN: NOT AT ALL
3. WORRYING TOO MUCH ABOUT DIFFERENT THINGS: NOT AT ALL
2. NOT BEING ABLE TO STOP OR CONTROL WORRYING: SEVERAL DAYS
1. FEELING NERVOUS, ANXIOUS, OR ON EDGE: SEVERAL DAYS

## 2021-05-27 ASSESSMENT — PATIENT HEALTH QUESTIONNAIRE - PHQ9
10. IF YOU CHECKED OFF ANY PROBLEMS, HOW DIFFICULT HAVE THESE PROBLEMS MADE IT FOR YOU TO DO YOUR WORK, TAKE CARE OF THINGS AT HOME, OR GET ALONG WITH OTHER PEOPLE: SOMEWHAT DIFFICULT
SUM OF ALL RESPONSES TO PHQ QUESTIONS 1-9: 8
SUM OF ALL RESPONSES TO PHQ QUESTIONS 1-9: 8

## 2021-05-27 NOTE — PROGRESS NOTES
Joey is a 26 year old who is being evaluated via a billable video visit.      How would you like to obtain your AVS? MyChart  If the video visit is dropped, the invitation should be resent by: Text to cell phone: [phone] (M)   Will anyone else be joining your video visit? No      Video Start Time: 9:09    Assessment & Plan     Moderate episode of recurrent major depressive disorder (H)    Improved with increase in wellbutrin dose to 300mg daily. She wants to continue this dose of medication. Plan follow up in 3 months.   - busPIRone (BUSPAR) 10 MG tablet  Dispense: 180 tablet; Refill: 1  - buPROPion (WELLBUTRIN XL) 300 MG 24 hr tablet  Dispense: 90 tablet; Refill: 0  Uncontrolled. Tolerating wellbutrin. Will increase dose to 300mg daily. She has found wellbutrin helpful in the past and her mom also found it effective. She did not respond to fluoxetine when it was started and was hospitalized with suicidal ideation after fluoxetine was started when she was in high school. She is very reluctant to try other medications.   Has therapy appt scheduled in May   - buPROPion (WELLBUTRIN XL) 300 MG 24 hr tablet  Dispense: 90 tablet; Refill: 0          Anxiety  Improved control. Will continue buspar 10mg twice daily. Plan for follow up in 3 months.    - busPIRone (BUSPAR) 10 MG tablet  Dispense: 180 tablet; Refill: 1  - buPROPion (WELLBUTRIN XL) 300 MG 24 hr tablet  Dispense: 90 tablet; Refill: 0       Uncontrolled. Increased since last visit, possibly secondary to increase in wellbutrin dose, which she does not want to change at this time. She did not find hydroxyzine helpful and noticed she would wake up groggy after taking it in the evening. Will start buspar 5mg twice daily and increase to 10mg twice daily after two weeks with plan to follow up in two weeks. Discussed risks/side effects/benefits of the medication.      Continues to have anxiety, which generally affects her after work, difficulty controlling her  thoughts and unwinding after work. She may try hydroxyzine 12.5mg tid as needed for anxiety. Discussed this can be sedating.    Has therapy appt scheduled in May   - buPROPion (WELLBUTRIN XL) 300 MG 24 hr tablet  Dispense: 90 tablet; Refill: 0  - hydrOXYzine (ATARAX) 25 MG tablet  Dispense: 60 tablet; Refill: 1               Return in about 3 months.      MD VONDA Wolff [facility]                         Return in about 3 months (around 2021) for with me, using a video visit.    MD VONDA Wolff [facility]    Myrna Cook is a 26 year old who presents for the following health issues      History of Present Illness       Mental Health Follow-up:  Patient presents to follow-up on Depression & Anxiety.Patient's depression since last visit has been:  Better  The patient is not having other symptoms associated with depression.  Patient's anxiety since last visit has been:  Better  The patient is not having other symptoms associated with anxiety.  Any significant life events: No  Patient is not feeling anxious or having panic attacks.  Patient has no concerns about alcohol or drug use.     Social History  Tobacco Use    Smoking status: Former Smoker      Packs/day: 0.50      Years: 10.00      Pack years: 5      Types: Cigarettes, Other      Quit date: 2020      Years since quittin.1    Smokeless tobacco: Current User    Tobacco comment: Quit cigarettes- currently using vape  Alcohol use: Yes  Drug use: Never      Today's PHQ-9         PHQ-9 Total Score:     (P) 8   PHQ-9 Q9 Thoughts of better off dead/self-harm past 2 weeks :   (P) Not at all   Thoughts of suicide or self harm:      Self-harm Plan:        Self-harm Action:          Safety concerns for self or others:             Answers for HPI/ROS submitted by the patient on 2021   Chronic problems general questions HPI Form  If you checked off any problems, how difficult have these  problems made it for you to do your work, take care of things at home, or get along with other people?: Somewhat difficult  PHQ9 TOTAL SCORE: 8  AYDIN 7 TOTAL SCORE: 7    Social History     Tobacco Use     Smoking status: Former Smoker     Packs/day: 0.50     Years: 10.00     Pack years: 5.00     Types: Cigarettes, Other     Quit date: 2020     Years since quittin.1     Smokeless tobacco: Current User     Tobacco comment: Quit cigarettes- currently using vape   Substance Use Topics     Alcohol use: Yes     Drug use: Never     PHQ 2021 5/3/2021 2021   PHQ-9 Total Score 11 10 8   Q9: Thoughts of better off dead/self-harm past 2 weeks Not at all Not at all Not at all     AYDIN-7 SCORE 2021 5/3/2021 2021   Total Score - 14 (moderate anxiety) 7 (mild anxiety)   Total Score 19 14 7     Last PHQ-9 2021   1.  Little interest or pleasure in doing things 1   2.  Feeling down, depressed, or hopeless 1   3.  Trouble falling or staying asleep, or sleeping too much 1   4.  Feeling tired or having little energy 1   5.  Poor appetite or overeating 2   6.  Feeling bad about yourself 1   7.  Trouble concentrating 1   8.  Moving slowly or restless 0   Q9: Thoughts of better off dead/self-harm past 2 weeks 0   PHQ-9 Total Score 8   Difficulty at work, home, or with people -     AYDIN-7  2021   1. Feeling nervous, anxious, or on edge 1   2. Not being able to stop or control worrying 1   3. Worrying too much about different things 0   4. Trouble relaxing 1   5. Being so restless that it is hard to sit still 1   6. Becoming easily annoyed or irritable 3   7. Feeling afraid, as if something awful might happen 0   AYDIN-7 Total Score 7   If you checked any problems, how difficult have they made it for you to do your work, take care of things at home, or get along with other people? -       Suicide Assessment Five-step Evaluation and Treatment (SAFE-T)         Review of Systems          Objective            Vitals:  No vitals were obtained today due to virtual visit.    Physical Exam   GENERAL: Healthy, alert and no distress  EYES: Eyes grossly normal to inspection.  No discharge or erythema, or obvious scleral/conjunctival abnormalities.  RESP: No audible wheeze, cough, or visible cyanosis.  No visible retractions or increased work of breathing.    SKIN: Visible skin clear. No significant rash, abnormal pigmentation or lesions.  NEURO: Cranial nerves grossly intact.  Mentation and speech appropriate for age.  PSYCH: Mentation appears normal, affect normal/bright, judgement and insight intact, normal speech and appearance well-groomed.                  Video-Visit Details    Type of service:  Video Visit    Video End Time:9:13    Originating Location (pt. Location): Home    Distant Location (provider location):  [facility]     Platform used for Video Visit: Aireon

## 2021-05-28 DIAGNOSIS — F41.9 ANXIETY: ICD-10-CM

## 2021-05-28 DIAGNOSIS — F33.1 MODERATE EPISODE OF RECURRENT MAJOR DEPRESSIVE DISORDER (H): ICD-10-CM

## 2021-05-28 RX ORDER — BUPROPION HYDROCHLORIDE 300 MG/1
TABLET ORAL
Qty: 90 TABLET | Refills: 0 | OUTPATIENT
Start: 2021-05-28

## 2021-05-28 ASSESSMENT — PATIENT HEALTH QUESTIONNAIRE - PHQ9: SUM OF ALL RESPONSES TO PHQ QUESTIONS 1-9: 8

## 2021-05-28 ASSESSMENT — ANXIETY QUESTIONNAIRES: GAD7 TOTAL SCORE: 7

## 2021-06-07 ENCOUNTER — VIRTUAL VISIT (OUTPATIENT)
Dept: PSYCHOLOGY | Facility: CLINIC | Age: 26
End: 2021-06-07
Payer: COMMERCIAL

## 2021-06-07 DIAGNOSIS — F33.1 MODERATE EPISODE OF RECURRENT MAJOR DEPRESSIVE DISORDER (H): Primary | ICD-10-CM

## 2021-06-07 PROCEDURE — 90834 PSYTX W PT 45 MINUTES: CPT | Mod: 95

## 2021-06-07 ASSESSMENT — PATIENT HEALTH QUESTIONNAIRE - PHQ9
SUM OF ALL RESPONSES TO PHQ QUESTIONS 1-9: 8
10. IF YOU CHECKED OFF ANY PROBLEMS, HOW DIFFICULT HAVE THESE PROBLEMS MADE IT FOR YOU TO DO YOUR WORK, TAKE CARE OF THINGS AT HOME, OR GET ALONG WITH OTHER PEOPLE: SOMEWHAT DIFFICULT
SUM OF ALL RESPONSES TO PHQ QUESTIONS 1-9: 8

## 2021-06-07 NOTE — PROGRESS NOTES
Progress Note    Patient Name: Joey Spears  Date: 6/7/21          Service Type: Individual      Session Start Time: 2:30 pm Session End Time: 3:17 pm     Session Length: 47    Session #: 4    Attendees: Client attended alone    Service Modality:  Video Visit:      Provider verified identity through the following two step process.  Patient provided:  Patient is known previously to provider    Telemedicine Visit: The patient's condition can be safely assessed and treated via synchronous audio and visual telemedicine encounter.      Reason for Telemedicine Visit: Services only offered telehealth    Originating Site (Patient Location): Patient's home    Distant Site (Provider Location): Provider Remote Setting    Consent:  The patient/guardian has verbally consented to: the potential risks and benefits of telemedicine (video visit) versus in person care; bill my insurance or make self-payment for services provided; and responsibility for payment of non-covered services.     Patient would like the video invitation sent by:  My Chart    Mode of Communication:  Video Conference via Amwell    As the provider I attest to compliance with applicable laws and regulations related to telemedicine.     Treatment Plan Last Reviewed:   PHQ-9 / AYDIN-7 :   Answers for HPI/ROS submitted by the patient on 6/7/2021   If you checked off any problems, how difficult have these problems made it for you to do your work, take care of things at home, or get along with other people?: Somewhat difficult  PHQ9 TOTAL SCORE: 8      DATA  Interactive Complexity: No  Crisis: No       Progress Since Last Session (Related to Symptoms / Goals / Homework):   Symptoms: Stable: adapting to medication, enjoying being more connected to friends, improving energy    Homework: Partially completed using exercise to support mood, using boundaries      Episode of Care Goals: Satisfactory progress - ACTION  (Actively working towards change); Intervened by reinforcing change plan / affirming steps taken     Current / Ongoing Stressors and Concerns:   Current: Processing aftermath of mom's visit from [place]     Treatment Objective(s) Addressed in This Session:   Decrease frequency and intensity of feeling down, depressed, hopeless     Intervention:     Supportive therapy: Processed confirming experience of visiting mom related to necessity of setting boundaries around alcohol, gossip, emotions and how time is spent. Celebrated self care during visit. Reflected on guilt related to recognition that having a healthy relationship with mom is not possible due to her insecurities and lack of emotional maturity. Surfaced need to reinforce boundaries in the context of potential future contact by mom.         ASSESSMENT: Current Emotional / Mental Status (status of significant symptoms):   Risk status (Self / Other harm or suicidal ideation)   Patient denies current fears or concerns for personal safety.   Patient denies current or recent suicidal ideation or behaviors.   Patient denies current or recent homicidal ideation or behaviors.   Patient denies current or recent self injurious behavior or ideation.   Patient denies other safety concerns.   Patient reports there has been no change in risk factors since their last session.     Patient reports there has been no change in protective factors since their last session.     Recommended that patient call 911 or go to the local ED should there be a change in any of these risk factors.     Appearance:   Appropriate    Eye Contact:   Good    Psychomotor Behavior: Normal    Attitude:   Cooperative  Interested   Orientation:   All   Speech    Rate / Production: Normal/ Responsive    Volume:  Normal    Mood:    Depressed    Affect:    Subdued    Thought Content:  Clear    Thought Form:  Coherent  Logical    Insight:    Good      Medication Review:   No changes to current psychiatric  medication(s)     Medication Compliance:   Yes     Changes in Health Issues:   None reported     Chemical Use Review:   Substance Use: Chemical use reviewed, no active concerns identified      Tobacco Use: No change in amount of tobacco use since last session.  Patient declined discussion at this time    Diagnosis:  1. Moderate episode of recurrent major depressive disorder (H)        Collateral Reports Completed:   Not Applicable    PLAN: (Patient Tasks / Therapist Tasks / Other)  Continue self care via exercise. Use PATRIZIA, FAST and Check the Facts, along with all necessary boundaries. Celebrate the strengths and supportive people in your life.        BONG Walden 6/7/2021            This note has been reviewed and I agree with the plan of care. This note is co-signed by DEE Chin, [facility], Supervisor, on: 6/13/21                                                                       Treatment Plan    Client's Name: Joey Spears  YOB: 1995    Date: 6/7/21    DSM-V Diagnoses: 296.31 (F33.0) Major Depressive Disorder, Recurrent Episode, Mild With anxious distress  Psychosocial / Contextual Factors: strained mother relationship; covid fatigue; public facing role with substantial emotional burden  WHODAS: 23    Referral / Collaboration:  Referral to another professional/service is not indicated at this time..    Anticipated number of session or this episode of care: 10      MeasurableTreatment Goal(s) related to diagnosis / functional impairment(s)  Goal 1: Client will experience an improvement in mood and anxiety evidenced by self report and AYDIN/PHQ scores of 5 or less    I will know I've met my goal when .      Objective #A (Client Action)    Client will Increase interest, engagement, and pleasure in doing things  Decrease frequency and intensity of feeling down, depressed, hopeless  Improve quantity and quality of night time sleep / decrease daytime naps  Feel less tired and  more energy during the day   Identify negative self-talk and behaviors: challenge core beliefs, myths, and actions  Improve concentration, focus, and mindfulness in daily activities   Feel less fidgety, restless or slow in daily activities / interpersonal interactions  Use boundaries and proactive communication in relationships.  Status: New - Date: 6/7/2021     Intervention(s)  Therapist will teach behavioral activation, sleep hygiene, CBT, DBT and ACT skills, proactive communication, mindfulness, grounding skills to support mood improvement and anxiety reduction.      Patient has reviewed and agreed to the above plan.      Janae Syed, NIKI  June 7, 2021

## 2021-06-08 ASSESSMENT — PATIENT HEALTH QUESTIONNAIRE - PHQ9: SUM OF ALL RESPONSES TO PHQ QUESTIONS 1-9: 8

## 2021-06-21 ENCOUNTER — VIRTUAL VISIT (OUTPATIENT)
Dept: PSYCHOLOGY | Facility: CLINIC | Age: 26
End: 2021-06-21
Payer: COMMERCIAL

## 2021-06-21 DIAGNOSIS — F33.1 MODERATE EPISODE OF RECURRENT MAJOR DEPRESSIVE DISORDER (H): Primary | ICD-10-CM

## 2021-06-21 PROCEDURE — 90834 PSYTX W PT 45 MINUTES: CPT | Mod: GT

## 2021-06-21 NOTE — PROGRESS NOTES
Progress Note    Patient Name: Joey Spears  Date: 6/21/21          Service Type: Individual      Session Start Time: 10:32 am Session End Time: 11:15 am     Session Length: 43 mins    Session #: 5    Attendees: Client attended alone    Service Modality:  Video Visit:      Provider verified identity through the following two step process.  Patient provided:  Patient is known previously to provider    Telemedicine Visit: The patient's condition can be safely assessed and treated via synchronous audio and visual telemedicine encounter.      Reason for Telemedicine Visit: Services only offered telehealth    Originating Site (Patient Location): Patient's home    Distant Site (Provider Location): Provider Remote Setting    Consent:  The patient/guardian has verbally consented to: the potential risks and benefits of telemedicine (video visit) versus in person care; bill my insurance or make self-payment for services provided; and responsibility for payment of non-covered services.     Patient would like the video invitation sent by:  My Chart    Mode of Communication:  Video Conference via Amwell    As the provider I attest to compliance with applicable laws and regulations related to telemedicine.     Treatment Plan Last Reviewed:   PHQ-9 / AYDIN-7 :   Answers for HPI/ROS submitted by the patient on 6/7/2021   If you checked off any problems, how difficult have these problems made it for you to do your work, take care of things at home, or get along with other people?: Somewhat difficult  PHQ9 TOTAL SCORE: 8      DATA  Interactive Complexity: No  Crisis: No       Progress Since Last Session (Related to Symptoms / Goals / Homework):  Symptoms: Stable:      Homework: Partially completed using exercise to support mood, using boundaries      Episode of Care Goals: Satisfactory progress - ACTION (Actively working towards change); Intervened by reinforcing change plan /  affirming steps taken     Current / Ongoing Stressors and Concerns:   Current: Processing aftermath of mom's visit from [place]-continuing effort by mom to engage; struggle with maintaining boundaries.      Treatment Objective(s) Addressed in This Session:   Decrease frequency and intensity of feeling down, depressed, hopeless  Use boundaries and proactive communication in relationships.     Intervention:     CBT: Surfaced ongoing struggle with mom's communication expectations/neediness; identified need for boundaries regarding timing, duration, location, structure of communication.. Recognized and validated personal need for a sense of control in the relationship dynamic. Identified priority of not 'riding the roller coaster of mom's mental health journey. Normalized need for distance based on past experiences and current prioritization of personal mental health. Supportive therapy: Reflected on guilt related to recognition that having a healthy relationship with mom is not possible due to her insecurities and lack of emotional maturity.       ASSESSMENT: Current Emotional / Mental Status (status of significant symptoms):   Risk status (Self / Other harm or suicidal ideation)   Patient denies current fears or concerns for personal safety.   Patient denies current or recent suicidal ideation or behaviors.   Patient denies current or recent homicidal ideation or behaviors.   Patient denies current or recent self injurious behavior or ideation.   Patient denies other safety concerns.   Patient reports there has been no change in risk factors since their last session.     Patient reports there has been no change in protective factors since their last session.     Recommended that patient call 911 or go to the local ED should there be a change in any of these risk factors.     Appearance:   Appropriate    Eye Contact:   Good    Psychomotor Behavior: Normal    Attitude:   Cooperative   Interested   Orientation:   All   Speech    Rate / Production: Normal/ Responsive    Volume:  Normal    Mood:    Depressed    Affect:    Subdued    Thought Content:  Clear    Thought Form:  Coherent  Logical    Insight:    Good      Medication Review:   No changes to current psychiatric medication(s)     Medication Compliance:   Yes     Changes in Health Issues:   None reported     Chemical Use Review:   Substance Use: Chemical use reviewed, no active concerns identified      Tobacco Use: No change in amount of tobacco use since last session.  Patient declined discussion at this time    Diagnosis:  1. Moderate episode of recurrent major depressive disorder (H)        Collateral Reports Completed:   Not Applicable    PLAN: (Patient Tasks / Therapist Tasks / Other)  Continue self care via exercise. Use PATRIZIA, FAST and Check the Facts, along with all necessary boundaries. Celebrate the strengths and supportive people in your life.Use SMART goals to refocus energy for workouts.         BONG Walden 6/21/2021            This note has been reviewed and I agree with the plan of care. This note is co-signed by DEE Chin, [facility], Supervisor, on: 6/23/21                                                                         Treatment Plan    Client's Name: Joey Spears  YOB: 1995    Date: 6/7/21    DSM-V Diagnoses: 296.31 (F33.0) Major Depressive Disorder, Recurrent Episode, Mild With anxious distress  Psychosocial / Contextual Factors: strained mother relationship; covid fatigue; public facing role with substantial emotional burden  WHODAS: 23    Referral / Collaboration:  Referral to another professional/service is not indicated at this time..    Anticipated number of session or this episode of care: 10      MeasurableTreatment Goal(s) related to diagnosis / functional impairment(s)  Goal 1: Client will experience an improvement in mood and anxiety evidenced by self report and  AYDIN/PHQ scores of 5 or less    I will know I've met my goal when .      Objective #A (Client Action)    Client will Increase interest, engagement, and pleasure in doing things  Decrease frequency and intensity of feeling down, depressed, hopeless  Improve quantity and quality of night time sleep / decrease daytime naps  Feel less tired and more energy during the day   Identify negative self-talk and behaviors: challenge core beliefs, myths, and actions  Improve concentration, focus, and mindfulness in daily activities   Feel less fidgety, restless or slow in daily activities / interpersonal interactions  Use boundaries and proactive communication in relationships.  Status: New - Date: 6/7/2021     Intervention(s)  Therapist will teach behavioral activation, sleep hygiene, CBT, DBT and ACT skills, proactive communication, mindfulness, grounding skills to support mood improvement and anxiety reduction.      Patient has reviewed and agreed to the above plan.      BONG Walden  June 7, 2021

## 2021-07-12 ENCOUNTER — VIRTUAL VISIT (OUTPATIENT)
Dept: PSYCHOLOGY | Facility: CLINIC | Age: 26
End: 2021-07-12
Payer: COMMERCIAL

## 2021-07-12 DIAGNOSIS — F33.1 MODERATE EPISODE OF RECURRENT MAJOR DEPRESSIVE DISORDER (H): Primary | ICD-10-CM

## 2021-07-12 PROCEDURE — 90834 PSYTX W PT 45 MINUTES: CPT | Mod: GT

## 2021-07-12 NOTE — PROGRESS NOTES
Progress Note    Patient Name: Joey Spears  Date: 7/12/21          Service Type: Individual      Session Start Time: 12:32 pm Session End Time: 1:16 pm     Session Length: 44 mins    Session #: 6    Attendees: Client attended alone    Service Modality:  Video Visit:      Provider verified identity through the following two step process.  Patient provided:  Patient is known previously to provider    Telemedicine Visit: The patient's condition can be safely assessed and treated via synchronous audio and visual telemedicine encounter.      Reason for Telemedicine Visit: Services only offered telehealth    Originating Site (Patient Location): Patient's home    Distant Site (Provider Location): Provider Remote Setting    Consent:  The patient/guardian has verbally consented to: the potential risks and benefits of telemedicine (video visit) versus in person care; bill my insurance or make self-payment for services provided; and responsibility for payment of non-covered services.     Patient would like the video invitation sent by:  My Chart    Mode of Communication:  Video Conference via Amwell    As the provider I attest to compliance with applicable laws and regulations related to telemedicine.     Treatment Plan Last Reviewed:   PHQ-9 / AYDIN-7 :   Answers for HPI/ROS submitted by the patient on 6/7/2021   If you checked off any problems, how difficult have these problems made it for you to do your work, take care of things at home, or get along with other people?: Somewhat difficult  PHQ9 TOTAL SCORE: 8      DATA  Interactive Complexity: No  Crisis: No       Progress Since Last Session (Related to Symptoms / Goals / Homework):  Symptoms: Stable: improving mood and outlook     Homework: Achieved / completed to satisfaction using exercise to support mood, using boundaries      Episode of Care Goals: Satisfactory progress - ACTION (Actively working towards change);  Intervened by reinforcing change plan / affirming steps taken     Current / Ongoing Stressors and Concerns:   Current: Recent contact with mom by phone; struggle with maintaining boundaries. Desire to focus on positive-productive relationships/activities that support wellness.     Treatment Objective(s) Addressed in This Session:   Decrease frequency and intensity of feeling down, depressed, hopeless  Use boundaries and proactive communication in relationships.     Intervention:     CBT: Processed experience of long phone conversation with mom regarding the past and celebrated increased ability to maintain boundaries and emotional neutrality.  Supported engagement in activities enjoying the outdoors/nature/hiking and time with family doing water sports/boating to support mood and reducing patterns contact that are less healthy. Identified changing pattern of communication related to needs in relationship and ability to say no. Supportive therapy: Provided active listening and validation. Supported behavioral change and effort toward identifying and prioritizing personal needs.    ASSESSMENT: Current Emotional / Mental Status (status of significant symptoms):   Risk status (Self / Other harm or suicidal ideation)   Patient denies current fears or concerns for personal safety.   Patient denies current or recent suicidal ideation or behaviors.   Patient denies current or recent homicidal ideation or behaviors.   Patient denies current or recent self injurious behavior or ideation.   Patient denies other safety concerns.   Patient reports there has been no change in risk factors since their last session.     Patient reports there has been no change in protective factors since their last session.     Recommended that patient call 911 or go to the local ED should there be a change in any of these risk factors.     Appearance:   Appropriate    Eye Contact:   Good    Psychomotor Behavior: Normal    Attitude:   Cooperative   Interested   Orientation:   All   Speech    Rate / Production: Normal/ Responsive    Volume:  Normal    Mood:    Depressed    Affect:    Subdued    Thought Content:  Clear    Thought Form:  Coherent  Logical    Insight:    Good      Medication Review:   No changes to current psychiatric medication(s)     Medication Compliance:   Yes     Changes in Health Issues:   None reported     Chemical Use Review:   Substance Use: Chemical use reviewed, no active concerns identified      Tobacco Use: No change in amount of tobacco use since last session.  Patient declined discussion at this time    Diagnosis:  1. Moderate episode of recurrent major depressive disorder (H)        Collateral Reports Completed:   Not Applicable    PLAN: (Patient Tasks / Therapist Tasks / Other)   Use PATRIZIA, FAST and Check the Facts, along with all necessary emotional and time boundaries. Celebrate the strengths and supportive people in your life.Use SMART goals to refocus energy for workouts, explore group fitness options/activities that prioritize personal needs.         BONG Walden 7/12/2021            This note has been reviewed and I agree with the plan of care. 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    Anticipated number of session or this episode of care: 10      MeasurableTreatment Goal(s) related to diagnosis / functional impairment(s)  Goal 1: Client will experience an  improvement in mood and anxiety evidenced by self report and AYDIN/PHQ scores of 5 or less    I will know I've met my goal when .      Objective #A (Client Action)    Client will Increase interest, engagement, and pleasure in doing things  Decrease frequency and intensity of feeling down, depressed, hopeless  Improve quantity and quality of night time sleep / decrease daytime naps  Feel less tired and more energy during the day   Identify negative self-talk and behaviors: challenge core beliefs, myths, and actions  Improve concentration, focus, and mindfulness in daily activities   Feel less fidgety, restless or slow in daily activities / interpersonal interactions  Use boundaries and proactive communication in relationships.  Status: New - Date: 6/7/2021     Intervention(s)  Therapist will teach behavioral activation, sleep hygiene, CBT, DBT and ACT skills, proactive communication, mindfulness, grounding skills to support mood improvement and anxiety reduction.      Patient has reviewed and agreed to the above plan.      BONG Walden  June 7, 2021

## 2021-08-02 ENCOUNTER — VIRTUAL VISIT (OUTPATIENT)
Dept: PSYCHOLOGY | Facility: CLINIC | Age: 26
End: 2021-08-02
Payer: COMMERCIAL

## 2021-08-02 DIAGNOSIS — F33.1 MODERATE EPISODE OF RECURRENT MAJOR DEPRESSIVE DISORDER (H): Primary | ICD-10-CM

## 2021-08-02 PROCEDURE — 90832 PSYTX W PT 30 MINUTES: CPT | Mod: GT

## 2021-08-02 NOTE — PROGRESS NOTES
Progress Note    Patient Name: Joey Spears  Date: 8/2/21          Service Type: Individual      Session Start Time: 10:33 am Session End Time: 11:16 pm     Session Length: 43 mins    Session #: 7    Attendees: Client attended alone    Service Modality:  Video Visit:      Provider verified identity through the following two step process.  Patient provided:  Patient is known previously to provider    Telemedicine Visit: The patient's condition can be safely assessed and treated via synchronous audio and visual telemedicine encounter.      Reason for Telemedicine Visit: Services only offered telehealth    Originating Site (Patient Location): Patient's home    Distant Site (Provider Location): Provider Remote Setting    Consent:  The patient/guardian has verbally consented to: the potential risks and benefits of telemedicine (video visit) versus in person care; bill my insurance or make self-payment for services provided; and responsibility for payment of non-covered services.     Patient would like the video invitation sent by:  My Chart    Mode of Communication:  Video Conference via Amwell    As the provider I attest to compliance with applicable laws and regulations related to telemedicine.     Treatment Plan Last Reviewed:   PHQ-9 / AYDIN-7 :   Answers for HPI/ROS submitted by the patient on 6/7/2021   If you checked off any problems, how difficult have these problems made it for you to do your work, take care of things at home, or get along with other people?: Somewhat difficult  PHQ9 TOTAL SCORE: 8      DATA  Interactive Complexity: No  Crisis: No       Progress Since Last Session (Related to Symptoms / Goals / Homework):  Symptoms: Stable: improving mood and outlook     Homework: Achieved / completed to satisfaction using exercise to support mood, using boundaries      Episode of Care Goals: Satisfactory progress - ACTION (Actively working towards change);  Intervened by reinforcing change plan / affirming steps taken     Current / Ongoing Stressors and Concerns:   Current: Limited contact with mom by text/phone; effort maintaining boundaries. Preparing for partner to start attending college part-time.    Continuing desire to focus on positive-productive relationships/activities that support wellness.     Treatment Objective(s) Addressed in This Session:   Decrease frequency and intensity of feeling down, depressed, hopeless  Use boundaries and proactive communication in relationships.     Intervention:     CBT:  Celebrated success with focusing energy on healthy supportive relationships+dividends those bring. Reflected on awareness around attending to varying need for social contact due to job roles and need for communication with partner.   Supportive therapy: Surfaced opportunity to establish and maintain a morning routine, while focusing on avoiding derailing and avoiding all or nothing thinking. Processed sense of silvana and satisfaction  related to raising and propagating houseplants. Provided active listening and validation. Supported behavioral change and effort toward identifying and prioritizing personal needs.    ASSESSMENT: Current Emotional / Mental Status (status of significant symptoms):   Risk status (Self / Other harm or suicidal ideation)   Patient denies current fears or concerns for personal safety.   Patient denies current or recent suicidal ideation or behaviors.   Patient denies current or recent homicidal ideation or behaviors.   Patient denies current or recent self injurious behavior or ideation.   Patient denies other safety concerns.   Patient reports there has been no change in risk factors since their last session.     Patient reports there has been no change in protective factors since their last session.     Recommended that patient call 911 or go to the local ED should there be a change in any of these risk  factors.     Appearance:   Appropriate    Eye Contact:   Good    Psychomotor Behavior: Normal    Attitude:   Cooperative  Interested   Orientation:   All   Speech    Rate / Production: Normal/ Responsive    Volume:  Normal    Mood:    Normal with some sadness regarding mother relationship   Affect:    Thoughtful, optimistic    Thought Content:  Clear    Thought Form:  Coherent  Logical    Insight:    Good      Medication Review:   No changes to current psychiatric medication(s)     Medication Compliance:   Yes     Changes in Health Issues:   None reported     Chemical Use Review:   Substance Use: Chemical use reviewed, no active concerns identified      Tobacco Use: No change in amount of tobacco use since last session.  Patient declined discussion at this time    Diagnosis:  1. Moderate episode of recurrent major depressive disorder (H)        Collateral Reports Completed:   Not Applicable    PLAN: (Patient Tasks / Therapist Tasks / Other)  Continue using PATRIZIA, FAST and Check the Facts, along with emotional and time boundaries. Engage intentionally with supportive people. Use SMART goals to refocus energy for workouts, explore group fitness options/activities that prioritize personal needs, along with other enjoyable activities.     BONG Walden 8/2/2021          This note has been reviewed and I agree with the plan of care. This note is co-signed by DEE Chin, 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    Anticipated number of session or this episode of care: 10      MeasurableTreatment Goal(s) related to diagnosis / functional impairment(s)  Goal 1: Client will experience an improvement in mood and anxiety evidenced by self report and AYDIN/PHQ scores of 5 or less    I will know I've met my goal when I'm enjoying my life more (paraphrase) .      Objective #A (Client Action)    Client will Increase interest, engagement, and pleasure in doing things  Decrease frequency and intensity of feeling down, depressed, hopeless  Improve quantity and quality of night time sleep / decrease daytime naps  Feel less tired and more energy during the day   Identify negative self-talk and behaviors: challenge core beliefs, myths, and actions  Improve concentration, focus, and mindfulness in daily activities   Feel less fidgety, restless or slow in daily activities / interpersonal interactions  Use boundaries and proactive communication in relationships.  Status: New - Date: 6/7/2021     Intervention(s)  Therapist will teach behavioral activation, sleep hygiene, CBT, DBT and ACT skills, proactive communication, mindfulness, grounding skills to support mood improvement and anxiety reduction.      Patient has reviewed and agreed to the above plan.      BONG Walden  June 7, 2021

## 2021-08-16 ENCOUNTER — VIRTUAL VISIT (OUTPATIENT)
Dept: PSYCHOLOGY | Facility: CLINIC | Age: 26
End: 2021-08-16
Payer: COMMERCIAL

## 2021-08-16 DIAGNOSIS — F33.1 MODERATE EPISODE OF RECURRENT MAJOR DEPRESSIVE DISORDER (H): Primary | ICD-10-CM

## 2021-08-16 PROCEDURE — 90834 PSYTX W PT 45 MINUTES: CPT | Mod: GT

## 2021-08-16 NOTE — PROGRESS NOTES
Progress Note    Patient Name: Joey Spears  Date: 8/16/21          Service Type: Individual      Session Start Time: 10:32 am Session End Time: 11:16 am     Session Length: 44 mins    Session #: 8    Attendees: Client attended alone    Service Modality:  Video Visit:      Provider verified identity through the following two step process.  Patient provided:  Patient is known previously to provider    Telemedicine Visit: The patient's condition can be safely assessed and treated via synchronous audio and visual telemedicine encounter.      Reason for Telemedicine Visit: Services only offered telehealth    Originating Site (Patient Location): Patient's home    Distant Site (Provider Location): Provider Remote Setting    Consent:  The patient/guardian has verbally consented to: the potential risks and benefits of telemedicine (video visit) versus in person care; bill my insurance or make self-payment for services provided; and responsibility for payment of non-covered services.     Patient would like the video invitation sent by:  My Chart    Mode of Communication:  Video Conference via Amwell    As the provider I attest to compliance with applicable laws and regulations related to telemedicine.     Treatment Plan Last Reviewed: 6/7/21  PHQ-9 / AYDIN-7 :   Answers for HPI/ROS submitted by the patient on 6/7/2021   If you checked off any problems, how difficult have these problems made it for you to do your work, take care of things at home, or get along with other people?: Somewhat difficult  PHQ9 TOTAL SCORE: 8      DATA  Interactive Complexity: No  Crisis: No       Progress Since Last Session (Related to Symptoms / Goals / Homework):  Symptoms: Stable: increasing anxiety at work regarding Delta variant increase     Homework: Did not complete currently struggling with behavioral activation      Episode of Care Goals: Satisfactory progress - ACTION (Actively working  towards change); Intervened by reinforcing change plan / affirming steps taken     Current / Ongoing Stressors and Concerns:   Current: Stressor related to Delta variant increase and lack of work leadership direction regarding policy. Limited contact with mom by text/phone; effort  maintaining boundaries. Continuing desire to focus on positive-productive relationships/activities that support wellness.     Treatment Objective(s) Addressed in This Session:   Decrease frequency and intensity of feeling down, depressed, hopeless  Use boundaries and proactive communication in relationships.     Intervention:     CBT:  Surfaced recognition of mood change impacted by Delta variant increase and sense of foreboding regarding potential for spike in cases. Surfaced anger related to the preventability of this situation. Supportive therapy: Revisited opportunity to establish and maintain a morning routine, while focusing on avoiding derailing and avoiding all or nothing thinking. Provided active listening and validation. Supported behavioral change and validated effort toward prioritizing personal needs.    ASSESSMENT: Current Emotional / Mental Status (status of significant symptoms):   Risk status (Self / Other harm or suicidal ideation)   Patient denies current fears or concerns for personal safety.   Patient denies current or recent suicidal ideation or behaviors.   Patient denies current or recent homicidal ideation or behaviors.   Patient denies current or recent self injurious behavior or ideation.   Patient denies other safety concerns.   Patient reports there has been no change in risk factors since their last session.     Patient reports there has been no change in protective factors since their last session.     Recommended that patient call 911 or go to the local ED should there be a change in any of these risk factors.     Appearance:   Appropriate    Eye Contact:   Good    Psychomotor Behavior: Normal     Attitude:   Cooperative  Interested   Orientation:   All   Speech    Rate / Production: Normal/ Responsive    Volume:  Normal    Mood:    Depressed  irritated   Affect:    Blunted    Thought Content:  Clear    Thought Form:  Coherent  Logical    Insight:    Good      Medication Review:   No changes to current psychiatric medication(s)     Medication Compliance:   Yes     Changes in Health Issues:   None reported     Chemical Use Review:   Substance Use: Chemical use reviewed, no active concerns identified      Tobacco Use: No change in amount of tobacco use since last session.  Patient declined discussion at this time    Diagnosis:  1. Moderate episode of recurrent major depressive disorder (H)        Collateral Reports Completed:   Not Applicable    PLAN: (Patient Tasks / Therapist Tasks / Other)  Continue using PATRIZIA, FAST and Check the Facts, along with emotional and time boundaries. Engage intentionally with supportive people. Use SMART goals to refocus energy for workouts, explore group fitness options/activities that prioritize personal needs, along with other enjoyable activities.     BONG Walden 8/16/2021          This note has been reviewed and I agree with the plan of care. 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    Anticipated number of session or this episode of care: 10      MeasurableTreatment Goal(s) related to diagnosis / functional impairment(s)  Goal  1: Client will experience an improvement in mood and anxiety evidenced by self report and AYDIN/PHQ scores of 5 or less    I will know I've met my goal when I'm enjoying my life more (paraphrase) .      Objective #A (Client Action)    Client will Increase interest, engagement, and pleasure in doing things  Decrease frequency and intensity of feeling down, depressed, hopeless  Improve quantity and quality of night time sleep / decrease daytime naps  Feel less tired and more energy during the day   Identify negative self-talk and behaviors: challenge core beliefs, myths, and actions  Improve concentration, focus, and mindfulness in daily activities   Feel less fidgety, restless or slow in daily activities / interpersonal interactions  Use boundaries and proactive communication in relationships.  Status: New - Date: 6/7/2021     Intervention(s)  Therapist will teach behavioral activation, sleep hygiene, CBT, DBT and ACT skills, proactive communication, mindfulness, grounding skills to support mood improvement and anxiety reduction.      Patient has reviewed and agreed to the above plan.      Janae Syed, BONG  June 7, 2021

## 2021-08-19 ENCOUNTER — VIRTUAL VISIT (OUTPATIENT)
Dept: FAMILY MEDICINE | Facility: CLINIC | Age: 26
End: 2021-08-19
Payer: COMMERCIAL

## 2021-08-19 DIAGNOSIS — F41.9 ANXIETY: ICD-10-CM

## 2021-08-19 DIAGNOSIS — F33.1 MODERATE EPISODE OF RECURRENT MAJOR DEPRESSIVE DISORDER (H): ICD-10-CM

## 2021-08-19 PROCEDURE — 99213 OFFICE O/P EST LOW 20 MIN: CPT | Mod: 95 | Performed by: FAMILY MEDICINE

## 2021-08-19 RX ORDER — BUPROPION HYDROCHLORIDE 300 MG/1
300 TABLET ORAL EVERY MORNING
Qty: 90 TABLET | Refills: 1 | Status: SHIPPED | OUTPATIENT
Start: 2021-08-19 | End: 2022-05-16

## 2021-08-19 RX ORDER — BUSPIRONE HYDROCHLORIDE 10 MG/1
10 TABLET ORAL 2 TIMES DAILY
Qty: 180 TABLET | Refills: 1 | Status: SHIPPED | OUTPATIENT
Start: 2021-08-19 | End: 2022-05-16

## 2021-08-19 ASSESSMENT — ANXIETY QUESTIONNAIRES
8. IF YOU CHECKED OFF ANY PROBLEMS, HOW DIFFICULT HAVE THESE MADE IT FOR YOU TO DO YOUR WORK, TAKE CARE OF THINGS AT HOME, OR GET ALONG WITH OTHER PEOPLE?: SOMEWHAT DIFFICULT
5. BEING SO RESTLESS THAT IT IS HARD TO SIT STILL: SEVERAL DAYS
2. NOT BEING ABLE TO STOP OR CONTROL WORRYING: SEVERAL DAYS
4. TROUBLE RELAXING: SEVERAL DAYS
1. FEELING NERVOUS, ANXIOUS, OR ON EDGE: SEVERAL DAYS
6. BECOMING EASILY ANNOYED OR IRRITABLE: MORE THAN HALF THE DAYS
GAD7 TOTAL SCORE: 7
7. FEELING AFRAID AS IF SOMETHING AWFUL MIGHT HAPPEN: NOT AT ALL
3. WORRYING TOO MUCH ABOUT DIFFERENT THINGS: SEVERAL DAYS
7. FEELING AFRAID AS IF SOMETHING AWFUL MIGHT HAPPEN: NOT AT ALL
GAD7 TOTAL SCORE: 7
GAD7 TOTAL SCORE: 7

## 2021-08-19 ASSESSMENT — PATIENT HEALTH QUESTIONNAIRE - PHQ9
SUM OF ALL RESPONSES TO PHQ QUESTIONS 1-9: 9
SUM OF ALL RESPONSES TO PHQ QUESTIONS 1-9: 9
10. IF YOU CHECKED OFF ANY PROBLEMS, HOW DIFFICULT HAVE THESE PROBLEMS MADE IT FOR YOU TO DO YOUR WORK, TAKE CARE OF THINGS AT HOME, OR GET ALONG WITH OTHER PEOPLE: SOMEWHAT DIFFICULT

## 2021-08-19 NOTE — PROGRESS NOTES
Joey is a 26 year old who is being evaluated via a billable video visit.      How would you like to obtain your AVS? MyChart  If the video visit is dropped, the invitation should be resent by: Text to cell phone: [phone]  Will anyone else be joining your video visit? No      Video Start Time: 9:09 AM    Assessment & Plan        Moderate episode of recurrent major depressive disorder (H)     Improved with increase in wellbutrin dose to 300mg daily. She wants to continue this dose of medication. Plan follow up in 6 months.   - busPIRone (BUSPAR) 10 MG tablet  Dispense: 180 tablet; Refill: 1  - buPROPion (WELLBUTRIN XL) 300 MG 24 hr tablet  Dispense: 90 tablet; Refill: 1  Uncontrolled. Tolerating wellbutrin. Will increase dose to 300mg daily. She has found wellbutrin helpful in the past and her mom also found it effective. She did not respond to fluoxetine when it was started and was hospitalized with suicidal ideation after fluoxetine was started when she was in high school. She is very reluctant to try other medications.   Has therapy appt scheduled in May   - buPROPion (WELLBUTRIN XL) 300 MG 24 hr tablet  Dispense: 90 tablet; Refill: 0           Anxiety  Improved control. Will continue buspar 10mg twice daily. Plan for follow up in 6 months.    - busPIRone (BUSPAR) 10 MG tablet  Dispense: 180 tablet; Refill: 1          Uncontrolled. Increased since last visit, possibly secondary to increase in wellbutrin dose, which she does not want to change at this time. She did not find hydroxyzine helpful and noticed she would wake up groggy after taking it in the evening. Will start buspar 5mg twice daily and increase to 10mg twice daily after two weeks with plan to follow up in two weeks. Discussed risks/side effects/benefits of the medication.      Continues to have anxiety, which generally affects her after work, difficulty controlling her thoughts and unwinding after work. She may try hydroxyzine 12.5mg tid as  "needed for anxiety. Discussed this can be sedating.    Has therapy appt scheduled in May   - buPROPion (WELLBUTRIN XL) 300 MG 24 hr tablet  Dispense: 90 tablet; Refill: 0  - hydrOXYzine (ATARAX) 25 MG tablet  Dispense: 60 tablet; Refill: 1              BMI:   Estimated body mass index is 27.03 kg/m  as calculated from the following:    Height as of 21: 1.613 m (5' 3.5\").    Weight as of 21: 70.3 kg (155 lb).            No follow-ups on file.    Kaci Villar MD   [facility] MARCE Cook is a 26 year old who presents for the following health issues      History of Present Illness       Mental Health Follow-up:  Patient presents to follow-up on Depression & Anxiety.Patient's depression since last visit has been:  Better  The patient is not having other symptoms associated with depression.  Patient's anxiety since last visit has been:  Better  The patient is not having other symptoms associated with anxiety.  Any significant life events: No  Patient is not feeling anxious or having panic attacks.  Patient has no concerns about alcohol or drug use.     Social History  Tobacco Use    Smoking status: Former Smoker      Packs/day: 0.50      Years: 10.00      Pack years: 5      Types: Cigarettes, Other      Quit date: 2020      Years since quittin.3    Smokeless tobacco: Current User    Tobacco comment: Quit cigarettes- currently using vape  Alcohol use: Yes  Drug use: Never      Today's PHQ-9         PHQ-9 Total Score:     (P) 9   PHQ-9 Q9 Thoughts of better off dead/self-harm past 2 weeks :   (P) Not at all   Thoughts of suicide or self harm:      Self-harm Plan:        Self-harm Action:          Safety concerns for self or others:             Answers for HPI/ROS submitted by the patient on 2021  If you checked off any problems, how difficult have these problems made it for you to do your work, take care of things at home, or get along with other people?: " Somewhat difficult  PHQ9 TOTAL SCORE: 9  AYDIN 7 TOTAL SCORE: 7      Social History     Tobacco Use     Smoking status: Former Smoker     Packs/day: 0.50     Years: 10.00     Pack years: 5.00     Types: Cigarettes, Other     Quit date: 2020     Years since quittin.3     Smokeless tobacco: Current User     Tobacco comment: Quit cigarettes- currently using vape   Substance Use Topics     Alcohol use: Yes     Drug use: Never     PHQ 2021   PHQ-9 Total Score 8 8 9   Q9: Thoughts of better off dead/self-harm past 2 weeks Not at all Not at all Not at all     AYDIN-7 SCORE 5/3/2021 2021 2021   Total Score 14 (moderate anxiety) 7 (mild anxiety) 7 (mild anxiety)   Total Score 14 7 7     Last PHQ-9 2021   1.  Little interest or pleasure in doing things 1   2.  Feeling down, depressed, or hopeless 1   3.  Trouble falling or staying asleep, or sleeping too much 3   4.  Feeling tired or having little energy 2   5.  Poor appetite or overeating 1   6.  Feeling bad about yourself 1   7.  Trouble concentrating 0   8.  Moving slowly or restless 0   Q9: Thoughts of better off dead/self-harm past 2 weeks 0   PHQ-9 Total Score 9   Difficulty at work, home, or with people -     AYDIN-7  2021   1. Feeling nervous, anxious, or on edge 1   2. Not being able to stop or control worrying 1   3. Worrying too much about different things 1   4. Trouble relaxing 1   5. Being so restless that it is hard to sit still 1   6. Becoming easily annoyed or irritable 2   7. Feeling afraid, as if something awful might happen 0   AYDIN-7 Total Score 7   If you checked any problems, how difficult have they made it for you to do your work, take care of things at home, or get along with other people? -       Suicide Assessment Five-step Evaluation and Treatment (SAFE-T)         Review of Systems         Objective           Vitals:  No vitals were obtained today due to virtual visit.    Physical Exam   GENERAL:  Healthy, alert and no distress  EYES: Eyes grossly normal to inspection.  No discharge or erythema, or obvious scleral/conjunctival abnormalities.  RESP: No audible wheeze, cough, or visible cyanosis.  No visible retractions or increased work of breathing.    SKIN: Visible skin clear. No significant rash, abnormal pigmentation or lesions.  NEURO: Cranial nerves grossly intact.  Mentation and speech appropriate for age.  PSYCH: Mentation appears normal, affect normal/bright, judgement and insight intact, normal speech and appearance well-groomed.                  Video-Visit Details    Type of service:  Video Visit    Video End Time:9:12 AM    Originating Location (pt. Location): Home    Distant Location (provider location):  [facility]     Platform used for Video Visit: TrackTik

## 2021-08-20 ASSESSMENT — PATIENT HEALTH QUESTIONNAIRE - PHQ9: SUM OF ALL RESPONSES TO PHQ QUESTIONS 1-9: 9

## 2021-08-20 ASSESSMENT — ANXIETY QUESTIONNAIRES: GAD7 TOTAL SCORE: 7

## 2021-08-25 DIAGNOSIS — F33.1 MODERATE EPISODE OF RECURRENT MAJOR DEPRESSIVE DISORDER (H): ICD-10-CM

## 2021-08-25 DIAGNOSIS — F41.9 ANXIETY: ICD-10-CM

## 2021-08-29 RX ORDER — BUPROPION HYDROCHLORIDE 300 MG/1
TABLET ORAL
Qty: 90 TABLET | Refills: 1 | OUTPATIENT
Start: 2021-08-29

## 2021-08-29 NOTE — TELEPHONE ENCOUNTER
Duplicate see script sent 8/19/2021 buPROPion (WELLBUTRIN XL) 300 MG 24 hr tablet # 90 tablet x 1 refills

## 2021-09-13 ENCOUNTER — VIRTUAL VISIT (OUTPATIENT)
Dept: PSYCHOLOGY | Facility: CLINIC | Age: 26
End: 2021-09-13
Payer: COMMERCIAL

## 2021-09-13 DIAGNOSIS — F33.1 MODERATE EPISODE OF RECURRENT MAJOR DEPRESSIVE DISORDER (H): Primary | ICD-10-CM

## 2021-09-13 PROCEDURE — 90834 PSYTX W PT 45 MINUTES: CPT | Mod: GT

## 2021-09-13 ASSESSMENT — ANXIETY QUESTIONNAIRES
GAD7 TOTAL SCORE: 8
7. FEELING AFRAID AS IF SOMETHING AWFUL MIGHT HAPPEN: NOT AT ALL
GAD7 TOTAL SCORE: 8
8. IF YOU CHECKED OFF ANY PROBLEMS, HOW DIFFICULT HAVE THESE MADE IT FOR YOU TO DO YOUR WORK, TAKE CARE OF THINGS AT HOME, OR GET ALONG WITH OTHER PEOPLE?: SOMEWHAT DIFFICULT
1. FEELING NERVOUS, ANXIOUS, OR ON EDGE: SEVERAL DAYS
7. FEELING AFRAID AS IF SOMETHING AWFUL MIGHT HAPPEN: NOT AT ALL
2. NOT BEING ABLE TO STOP OR CONTROL WORRYING: SEVERAL DAYS
4. TROUBLE RELAXING: NEARLY EVERY DAY
5. BEING SO RESTLESS THAT IT IS HARD TO SIT STILL: NOT AT ALL
6. BECOMING EASILY ANNOYED OR IRRITABLE: MORE THAN HALF THE DAYS
3. WORRYING TOO MUCH ABOUT DIFFERENT THINGS: SEVERAL DAYS
GAD7 TOTAL SCORE: 8

## 2021-09-13 ASSESSMENT — PATIENT HEALTH QUESTIONNAIRE - PHQ9
SUM OF ALL RESPONSES TO PHQ QUESTIONS 1-9: 13
10. IF YOU CHECKED OFF ANY PROBLEMS, HOW DIFFICULT HAVE THESE PROBLEMS MADE IT FOR YOU TO DO YOUR WORK, TAKE CARE OF THINGS AT HOME, OR GET ALONG WITH OTHER PEOPLE: VERY DIFFICULT
SUM OF ALL RESPONSES TO PHQ QUESTIONS 1-9: 13

## 2021-09-13 NOTE — PROGRESS NOTES
Progress Note    Patient Name: Joey Spears  Date: 9/13/21          Service Type: Individual      Session Start Time: 11:30 am Session End Time: 12:15 pm     Session Length: 45 mins    Session #: 9    Attendees: Client attended alone    Service Modality:  Video Visit:      Provider verified identity through the following two step process.  Patient provided:  Patient is known previously to provider    Telemedicine Visit: The patient's condition can be safely assessed and treated via synchronous audio and visual telemedicine encounter.      Reason for Telemedicine Visit: Services only offered telehealth    Originating Site (Patient Location): Patient's home    Distant Site (Provider Location): Provider Remote Setting    Consent:  The patient/guardian has verbally consented to: the potential risks and benefits of telemedicine (video visit) versus in person care; bill my insurance or make self-payment for services provided; and responsibility for payment of non-covered services.     Patient would like the video invitation sent by:  My Chart    Mode of Communication:  Video Conference via Amwell    As the provider I attest to compliance with applicable laws and regulations related to telemedicine.     Treatment Plan Last Reviewed: 6/7/21  PHQ-9 / AYDIN-7 :     Answers for HPI/ROS submitted by the patient on 9/13/2021  If you checked off any problems, how difficult have these problems made it for you to do your work, take care of things at home, or get along with other people?: Very difficult  PHQ9 TOTAL SCORE: 13  AYDIN 7 TOTAL SCORE: 8    DATA  Interactive Complexity: No  Crisis: No       Progress Since Last Session (Related to Symptoms / Goals / Homework):  Symptoms: Worse; lower mood/energy-ability to socialize       Homework: Partially completed working on behavioral activation      Episode of Care Goals: Satisfactory progress - ACTION (Actively working towards  change); Intervened by reinforcing change plan / affirming steps taken     Current / Ongoing Stressors and Concerns:  Current: Recent bout with flu, missing work/low energy. Starting spin class. Limited contact with mom by text/phone; effort  maintaining boundaries. Continuing desire to focus on positive-productive relationships/activities that support wellness.     Treatment Objective(s) Addressed in This Session:   Decrease frequency and intensity of feeling down, depressed, hopeless  Use boundaries and proactive communication in relationships.     Intervention:     Motivational Interviewing  Target Behavior: return to regular group fitness-spinning MWF    Stage of Change: PREPARATION (Decided to change - considering how)    MI Intervention: Co-Developed Goal: spinning class 3x week, Expressed Empathy/Understanding, Open-ended questions, Reflections: simple and complex and Change talk (evoked)     Change Talk Expressed by the Patient: Desire to change Ability to change Reasons to change Need to change Committment to change Activation    Provider Response to Change Talk: E - Evoked more info from patient about behavior change, A - Affirmed patient's thoughts, decisions, or attempts at behavior change, R - Reflected patient's change talk and S - Summarized patient's change talk statements    Supportive therapy: Validated recognition that positive feedback-enthusiasm isn't a constant outside of work. Provided active listening and validation. Supported behavioral change and validated effort toward prioritizing personal needs.    ASSESSMENT: Current Emotional / Mental Status (status of significant symptoms):   Risk status (Self / Other harm or suicidal ideation)   Patient denies current fears or concerns for personal safety.   Patient denies current or recent suicidal ideation or behaviors.   Patient denies current or recent homicidal ideation or behaviors.   Patient denies current or recent self injurious behavior or  ideation.   Patient denies other safety concerns.   Patient reports there has been no change in risk factors since their last session.     Patient reports there has been no change in protective factors since their last session.     Recommended that patient call 911 or go to the local ED should there be a change in any of these risk factors.     Appearance:   Appropriate    Eye Contact:   Good    Psychomotor Behavior: Normal    Attitude:   Cooperative  Interested   Orientation:   All   Speech    Rate / Production: Normal/ Responsive    Volume:  Normal    Mood:    Anxious     Affect:    Thoughtful, intentional    Thought Content:  Clear    Thought Form:  Coherent  Logical    Insight:    Good      Medication Review:   No changes to current psychiatric medication(s)     Medication Compliance:   Yes     Changes in Health Issues:   None reported     Chemical Use Review:   Substance Use: Chemical use reviewed, no active concerns identified      Tobacco Use: No change in amount of tobacco use since last session.  Patient declined discussion at this time    Diagnosis:  1. Moderate episode of recurrent major depressive disorder (H)        Collateral Reports Completed:   Not Applicable    PLAN: (Patient Tasks / Therapist Tasks / Other)  Continue using PATRIZIA, FAST and Check the Facts, along with emotional and time boundaries. Engage intentionally with supportive people. [facility] spin classes.    BONG Walden 9/13/21  This note has been reviewed and I agree with the plan of care. This note is co-signed by DEE Chin, [facility], Supervisor, on: 9/15/21                                                        Treatment Plan    Client's Name: Joey Spears  YOB: 1995    Date: 9/13/21    DSM-V Diagnoses: 296.31 (F33.0) Major Depressive Disorder, Recurrent Episode, Mild With anxious distress  Psychosocial / Contextual Factors: strained mother relationship; covid fatigue; public facing role with substantial  emotional burden  WHODAS: 23    Referral / Collaboration:  Referral to another professional/service is not indicated at this time..    Anticipated number of session or this episode of care: 10      MeasurableTreatment Goal(s) related to diagnosis / functional impairment(s)  Goal 1: Client will experience an improvement in mood and anxiety evidenced by self report and AYDIN/PHQ scores of 5 or less    I will know I've met my goal when I'm enjoying my life more (paraphrase) .      Objective #A (Client Action)    Client will Increase interest, engagement, and pleasure in doing things  Decrease frequency and intensity of feeling down, depressed, hopeless  Improve quantity and quality of night time sleep / decrease daytime naps  Feel less tired and more energy during the day   Identify negative self-talk and behaviors: challenge core beliefs, myths, and actions  Improve concentration, focus, and mindfulness in daily activities   Feel less fidgety, restless or slow in daily activities / interpersonal interactions  Use boundaries and proactive communication in relationships.  Status:    9/13/21    Intervention(s)  Therapist will teach behavioral activation, sleep hygiene, CBT, DBT and ACT skills, proactive communication, mindfulness, grounding skills to support mood improvement and anxiety reduction.      Patient has reviewed and agreed to the above plan.      BONG Walden  9/13/21

## 2021-09-14 ASSESSMENT — PATIENT HEALTH QUESTIONNAIRE - PHQ9: SUM OF ALL RESPONSES TO PHQ QUESTIONS 1-9: 13

## 2021-09-14 ASSESSMENT — ANXIETY QUESTIONNAIRES: GAD7 TOTAL SCORE: 8

## 2021-09-27 ENCOUNTER — VIRTUAL VISIT (OUTPATIENT)
Dept: PSYCHOLOGY | Facility: CLINIC | Age: 26
End: 2021-09-27
Payer: COMMERCIAL

## 2021-09-27 DIAGNOSIS — F33.1 MODERATE EPISODE OF RECURRENT MAJOR DEPRESSIVE DISORDER (H): Primary | ICD-10-CM

## 2021-09-27 PROCEDURE — 90834 PSYTX W PT 45 MINUTES: CPT | Mod: GT

## 2021-09-27 NOTE — PROGRESS NOTES
Progress Note    Patient Name: Joey Spears  Date: 9/27/21          Service Type: Individual      Session Start Time: 8:30 am Session End Time: 9:15 am     Session Length: 45 mins    Session #: 10    Attendees: Client attended alone    Service Modality:  Video Visit:      Provider verified identity through the following two step process.  Patient provided:  Patient is known previously to provider    Telemedicine Visit: The patient's condition can be safely assessed and treated via synchronous audio and visual telemedicine encounter.      Reason for Telemedicine Visit: Services only offered telehealth    Originating Site (Patient Location): Patient's home    Distant Site (Provider Location): Provider Remote Setting    Consent:  The patient/guardian has verbally consented to: the potential risks and benefits of telemedicine (video visit) versus in person care; bill my insurance or make self-payment for services provided; and responsibility for payment of non-covered services.     Patient would like the video invitation sent by:  My Chart    Mode of Communication:  Video Conference via Amwell    As the provider I attest to compliance with applicable laws and regulations related to telemedicine.     Treatment Plan Last Reviewed: 6/7/21  PHQ-9 / AYDIN-7 :     Answers for HPI/ROS submitted by the patient on 9/13/2021  If you checked off any problems, how difficult have these problems made it for you to do your work, take care of things at home, or get along with other people?: Very difficult  PHQ9 TOTAL SCORE: 13  AYDIN 7 TOTAL SCORE: 8    DATA  Interactive Complexity: No  Crisis: No       Progress Since Last Session (Related to Symptoms / Goals / Homework):  Symptoms: Worse; lower mood/energy-ability to socialize       Homework: Partially completed working on behavioral activation      Episode of Care Goals: Satisfactory progress - ACTION (Actively working towards change);  Intervened by reinforcing change plan / affirming steps taken     Current / Ongoing Stressors and Concerns:  Current: Recent contact with mom revealed her desire to move home to [place] from [place], after abruptly leaving the state 9 years ago. Limited contact with mom by text/phone; effort maintaining boundaries. Continuing desire to focus on positive-productive relationships/activities that support wellness.     Treatment Objective(s) Addressed in This Session:   Decrease frequency and intensity of feeling down, depressed, hopeless  Use boundaries and proactive communication in relationships.     Intervention:     Motivational Interviewing  Target Behavior: continue with regular group fitness-spinning MW    Stage of Change: ACTION (Actively working towards change)    MI Intervention: Co-Developed Goal: spinning class 3x week, Expressed Empathy/Understanding, Open-ended questions, Reflections: simple and complex and Change talk (evoked)     Change Talk Expressed by the Patient: Desire to change Ability to change Reasons to change Need to change Committment to change Activation    Provider Response to Change Talk: E - Evoked more info from patient about behavior change, A - Affirmed patient's thoughts, decisions, or attempts at behavior change, R - Reflected patient's change talk and S - Summarized patient's change talk statements   CBT: Processed stressor of potential for mom's move to [place]; reflected on past dysfunctional and hurtful treatment/inconsistencies in relationship, and mom's lack of recognition/insight/apology regarding choices that negatively impacted patient. Identified opportunity for proactive communication regarding boundaries.  Supportive therapy: Provided active listening and validation. Supported behavioral change and validated effort toward prioritizing personal needs.    ASSESSMENT: Current Emotional / Mental Status (status of significant symptoms):   Risk status (Self / Other harm or suicidal  ideation)   Patient denies current fears or concerns for personal safety.   Patient denies current or recent suicidal ideation or behaviors.   Patient denies current or recent homicidal ideation or behaviors.   Patient denies current or recent self injurious behavior or ideation.   Patient denies other safety concerns.   Patient reports there has been no change in risk factors since their last session.     Patient reports there has been no change in protective factors since their last session.     Recommended that patient call 911 or go to the local ED should there be a change in any of these risk factors.     Appearance:   Appropriate    Eye Contact:   Good    Psychomotor Behavior: Normal    Attitude:   Cooperative  Interested   Orientation:   All   Speech    Rate / Production: Normal/ Responsive    Volume:  Normal    Mood:    Anxious     Affect:    Thoughtful, intentional    Thought Content:  Clear    Thought Form:  Coherent  Logical    Insight:    Good      Medication Review:   No changes to current psychiatric medication(s)     Medication Compliance:   Yes     Changes in Health Issues:   None reported     Chemical Use Review:   Substance Use: Chemical use reviewed, no active concerns identified      Tobacco Use: No change in amount of tobacco use since last session.  Patient declined discussion at this time    Diagnosis:  1. Moderate episode of recurrent major depressive disorder (H)        Collateral Reports Completed:   Not Applicable    PLAN: (Patient Tasks / Therapist Tasks / Other)  Continue using PATRIZIA, FAST and Check the Facts, along with emotional and time boundaries. Engage intentionally with supportive people. Continue MWF spin classes.    BONG Walden 9/27/21  This note has been reviewed and I agree with the plan of care. This note is co-signed by DEE Chin, LICSW, Supervisor, on: 10/6/21                                                      Treatment Plan    Client's Name: Joey  Jossie Spears  YOB: 1995    Date: 9/13/21    DSM-V Diagnoses: 296.31 (F33.0) Major Depressive Disorder, Recurrent Episode, Mild With anxious distress  Psychosocial / Contextual Factors: strained mother relationship; covid fatigue; public facing role with substantial emotional burden  WHODAS: 23    Referral / Collaboration:  Referral to another professional/service is not indicated at this time..    Anticipated number of session or this episode of care: 10      MeasurableTreatment Goal(s) related to diagnosis / functional impairment(s)  Goal 1: Client will experience an improvement in mood and anxiety evidenced by self report and AYDIN/PHQ scores of 5 or less    I will know I've met my goal when I'm enjoying my life more (paraphrase) .      Objective #A (Client Action)    Client will Increase interest, engagement, and pleasure in doing things  Decrease frequency and intensity of feeling down, depressed, hopeless  Improve quantity and quality of night time sleep / decrease daytime naps  Feel less tired and more energy during the day   Identify negative self-talk and behaviors: challenge core beliefs, myths, and actions  Improve concentration, focus, and mindfulness in daily activities   Feel less fidgety, restless or slow in daily activities / interpersonal interactions  Use boundaries and proactive communication in relationships.  Status:    9/13/21    Intervention(s)  Therapist will teach behavioral activation, sleep hygiene, CBT, DBT and ACT skills, proactive communication, mindfulness, grounding skills to support mood improvement and anxiety reduction.      Patient has reviewed and agreed to the above plan.      Janae Syed, BONG  9/13/21

## 2021-10-11 ENCOUNTER — HEALTH MAINTENANCE LETTER (OUTPATIENT)
Age: 26
End: 2021-10-11

## 2021-10-11 ENCOUNTER — VIRTUAL VISIT (OUTPATIENT)
Dept: PSYCHOLOGY | Facility: CLINIC | Age: 26
End: 2021-10-11
Payer: COMMERCIAL

## 2021-10-11 DIAGNOSIS — F33.0 MAJOR DEPRESSIVE DISORDER, RECURRENT EPISODE, MILD WITH ANXIOUS DISTRESS (H): Primary | ICD-10-CM

## 2021-10-11 PROCEDURE — 90834 PSYTX W PT 45 MINUTES: CPT | Mod: GT

## 2021-10-11 ASSESSMENT — ANXIETY QUESTIONNAIRES
GAD7 TOTAL SCORE: 13
7. FEELING AFRAID AS IF SOMETHING AWFUL MIGHT HAPPEN: SEVERAL DAYS
3. WORRYING TOO MUCH ABOUT DIFFERENT THINGS: SEVERAL DAYS
5. BEING SO RESTLESS THAT IT IS HARD TO SIT STILL: NEARLY EVERY DAY
8. IF YOU CHECKED OFF ANY PROBLEMS, HOW DIFFICULT HAVE THESE MADE IT FOR YOU TO DO YOUR WORK, TAKE CARE OF THINGS AT HOME, OR GET ALONG WITH OTHER PEOPLE?: SOMEWHAT DIFFICULT
1. FEELING NERVOUS, ANXIOUS, OR ON EDGE: SEVERAL DAYS
2. NOT BEING ABLE TO STOP OR CONTROL WORRYING: SEVERAL DAYS
4. TROUBLE RELAXING: NEARLY EVERY DAY
GAD7 TOTAL SCORE: 13
GAD7 TOTAL SCORE: 13
6. BECOMING EASILY ANNOYED OR IRRITABLE: NEARLY EVERY DAY
7. FEELING AFRAID AS IF SOMETHING AWFUL MIGHT HAPPEN: SEVERAL DAYS

## 2021-10-11 ASSESSMENT — PATIENT HEALTH QUESTIONNAIRE - PHQ9
10. IF YOU CHECKED OFF ANY PROBLEMS, HOW DIFFICULT HAVE THESE PROBLEMS MADE IT FOR YOU TO DO YOUR WORK, TAKE CARE OF THINGS AT HOME, OR GET ALONG WITH OTHER PEOPLE: VERY DIFFICULT
SUM OF ALL RESPONSES TO PHQ QUESTIONS 1-9: 12
SUM OF ALL RESPONSES TO PHQ QUESTIONS 1-9: 12

## 2021-10-11 NOTE — PROGRESS NOTES
Progress Note    Patient Name: Joey Spears  Date: 10/11/21         Service Type: Individual      Session Start Time: 9:30 am Session End Time: 10:15 am     Session Length: 45 mins    Session #: 11    Attendees: Client attended alone    Service Modality:  Video Visit:      Provider verified identity through the following two step process.  Patient provided:  Patient is known previously to provider    Telemedicine Visit: The patient's condition can be safely assessed and treated via synchronous audio and visual telemedicine encounter.      Reason for Telemedicine Visit: Services only offered telehealth    Originating Site (Patient Location): Patient's home    Distant Site (Provider Location): Provider Remote Setting    Consent:  The patient/guardian has verbally consented to: the potential risks and benefits of telemedicine (video visit) versus in person care; bill my insurance or make self-payment for services provided; and responsibility for payment of non-covered services.     Patient would like the video invitation sent by:  My Chart    Mode of Communication:  Video Conference via [facility]well    As the provider I attest to compliance with applicable laws and regulations related to telemedicine.     Treatment Plan Last Reviewed:  9/13/2021  PHQ-9 / AYDIN-7 :   Answers for HPI/ROS submitted by the patient on 10/11/2021  If you checked off any problems, how difficult have these problems made it for you to do your work, take care of things at home, or get along with other people?: Very difficult  PHQ9 TOTAL SCORE: 12  AYDIN 7 TOTAL SCORE: 13        DATA  Interactive Complexity: No  Crisis: No       Progress Since Last Session (Related to Symptoms / Goals / Homework):  Symptoms: Improving; ability to find balance in activities to support mood.    Homework: Partially completed working on behavioral activation      Episode of Care Goals: Satisfactory progress - ACTION  (Actively working towards change); Intervened by reinforcing change plan / affirming steps taken     Current / Ongoing Stressors and Concerns:  Current: Recent contact with mom revealed changed plan to stay in [place]. Ongoing effort maintaining boundaries. Continuing desire to focus on positive-productive relationships/activities that support wellness.     Treatment Objective(s) Addressed in This Session:   Decrease frequency and intensity of feeling down, depressed, hopeless  Use boundaries and proactive communication in relationships.     Intervention:     Motivational Interviewing  Target Behavior: find balance between connection with family and overall life management-prioritization    Stage of Change: PREPARATION (Decided to change - considering how)    MI Intervention: Expressed Empathy/Understanding, Supported Autonomy, Collaboration, Evocation, Open-ended questions, Reflections: simple and complex and Change talk (evoked)     Change Talk Expressed by the Patient: Desire to change Ability to change Reasons to change Need to change Committment to change Activation    Provider Response to Change Talk: E - Evoked more info from patient about behavior change, A - Affirmed patient's thoughts, decisions, or attempts at behavior change, R - Reflected patient's change talk and S - Summarized patient's change talk statements   CBT:  Reinforced success with proactive communication regarding boundaries.  Supportive therapy: Provided active listening and validation. Supported behavioral change and validated effort toward prioritizing personal needs.    ASSESSMENT: Current Emotional / Mental Status (status of significant symptoms):   Risk status (Self / Other harm or suicidal ideation)   Patient denies current fears or concerns for personal safety.   Patient denies current or recent suicidal ideation or behaviors.   Patient denies current or recent homicidal ideation or behaviors.   Patient denies current or recent self  injurious behavior or ideation.   Patient denies other safety concerns.   Patient reports there has been no change in risk factors since their last session.     Patient reports there has been no change in protective factors since their last session.     Recommended that patient call 911 or go to the local ED should there be a change in any of these risk factors.     Appearance:   Appropriate    Eye Contact:   Good    Psychomotor Behavior: Normal    Attitude:   Cooperative  Interested   Orientation:   All   Speech    Rate / Production: Normal/ Responsive    Volume:  Normal    Mood:    Anxious     Affect:    Thoughtful, intentional    Thought Content:  Clear    Thought Form:  Coherent  Logical    Insight:    Good      Medication Review:   No changes to current psychiatric medication(s)     Medication Compliance:   Yes     Changes in Health Issues:   None reported     Chemical Use Review:   Substance Use: Chemical use reviewed, no active concerns identified      Tobacco Use: No change in amount of tobacco use since last session.  Patient declined discussion at this time    Diagnosis:  No diagnosis found.    Collateral Reports Completed:   Not Applicable    PLAN: (Patient Tasks / Therapist Tasks / Other)  Continue using PATRIZIA, FAST and Check the Facts, along with emotional and time boundaries. Engage intentionally with supportive people, prioritizing family time. Continue spin classes.    BONG Walden 10/11/21  This note has been reviewed and I agree with the plan of care. 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    Anticipated number of session or this episode of care: 10      MeasurableTreatment Goal(s) related to diagnosis / functional impairment(s)  Goal 1: Client will experience an improvement in mood and anxiety evidenced by self report and AYDIN/PHQ scores of 5 or less    I will know I've met my goal when I'm enjoying my life more (paraphrase) .      Objective #A (Client Action)    Client will Increase interest, engagement, and pleasure in doing things  Decrease frequency and intensity of feeling down, depressed, hopeless  Improve quantity and quality of night time sleep / decrease daytime naps  Feel less tired and more energy during the day   Identify negative self-talk and behaviors: challenge core beliefs, myths, and actions  Improve concentration, focus, and mindfulness in daily activities   Feel less fidgety, restless or slow in daily activities / interpersonal interactions  Use boundaries and proactive communication in relationships.  Status:    9/13/21    Intervention(s)  Therapist will teach behavioral activation, sleep hygiene, CBT, DBT and ACT skills, proactive communication, mindfulness, grounding skills to support mood improvement and anxiety reduction.      Patient has reviewed and agreed to the above plan.      BONG Walden  9/13/21

## 2021-10-12 ASSESSMENT — ANXIETY QUESTIONNAIRES: GAD7 TOTAL SCORE: 13

## 2021-10-12 ASSESSMENT — PATIENT HEALTH QUESTIONNAIRE - PHQ9: SUM OF ALL RESPONSES TO PHQ QUESTIONS 1-9: 12

## 2021-10-25 ENCOUNTER — VIRTUAL VISIT (OUTPATIENT)
Dept: PSYCHOLOGY | Facility: CLINIC | Age: 26
End: 2021-10-25
Payer: COMMERCIAL

## 2021-10-25 DIAGNOSIS — F33.0 MAJOR DEPRESSIVE DISORDER, RECURRENT EPISODE, MILD WITH ANXIOUS DISTRESS (H): Primary | ICD-10-CM

## 2021-10-25 PROCEDURE — 90834 PSYTX W PT 45 MINUTES: CPT | Mod: GT

## 2021-10-25 NOTE — PROGRESS NOTES
Progress Note    Patient Name: Joey Spears  Date: 10/25/21         Service Type: Individual      Session Start Time: 2:30 pm Session End Time: 3:15 pm     Session Length: 45 mins    Session #: 12    Attendees: Client attended alone    Service Modality:  Video Visit:      Provider verified identity through the following two step process.  Patient provided:  Patient is known previously to provider    Telemedicine Visit: The patient's condition can be safely assessed and treated via synchronous audio and visual telemedicine encounter.      Reason for Telemedicine Visit: Services only offered telehealth    Originating Site (Patient Location): Patient's home    Distant Site (Provider Location): Provider Remote Setting    Consent:  The patient/guardian has verbally consented to: the potential risks and benefits of telemedicine (video visit) versus in person care; bill my insurance or make self-payment for services provided; and responsibility for payment of non-covered services.     Patient would like the video invitation sent by:  My Chart    Mode of Communication:  Video Conference via Amwell    As the provider I attest to compliance with applicable laws and regulations related to telemedicine.     Treatment Plan Last Reviewed:  9/13/2021  PHQ-9 / AYDIN-7 :   Answers for HPI/ROS submitted by the patient on 10/11/2021  If you checked off any problems, how difficult have these problems made it for you to do your work, take care of things at home, or get along with other people?: Very difficult  PHQ9 TOTAL SCORE: 12  AYDIN 7 TOTAL SCORE: 13        DATA  Interactive Complexity: No  Crisis: No       Progress Since Last Session (Related to Symptoms / Goals / Homework):  Symptoms: Improving; ability to find balance in activities to support mood.    Homework: Partially completed working on behavioral activation      Episode of Care Goals: Satisfactory progress - ACTION (Actively  working towards change); Intervened by reinforcing change plan / affirming steps taken     Current / Ongoing Stressors and Concerns:  Current: Struggling with goal setting-feeling a lack of focus related to fitness. Recent contact with yulianacarlos alberto surfaced his point of view regarding what is best for clients mom. Ongoing effort maintaining boundaries. Continuing desire to focus on positive-productive relationships/activities that support wellness.     Treatment Objective(s) Addressed in This Session:   Decrease frequency and intensity of feeling down, depressed, hopeless  Use boundaries and proactive communication in relationships.     Intervention:     Motivational Interviewing  Target Behavior: consider life goals - priorities to bring new energy-focus to life.     Stage of Change: PREPARATION (Decided to change - considering how)    MI Intervention: Expressed Empathy/Understanding, Supported Autonomy, Collaboration, Evocation, Open-ended questions and Reflections: simple and complex     Change Talk Expressed by the Patient: Desire to change Reasons to change Need to change    Provider Response to Change Talk: E - Evoked more info from patient about behavior change, A - Affirmed patient's thoughts, decisions, or attempts at behavior change, R - Reflected patient's change talk and S - Summarized patient's change talk statements     Supportive therapy: Provided active listening and validation. Supported behavioral change and validated effort toward prioritizing personal needs.    ASSESSMENT: Current Emotional / Mental Status (status of significant symptoms):   Risk status (Self / Other harm or suicidal ideation)   Patient denies current fears or concerns for personal safety.   Patient denies current or recent suicidal ideation or behaviors.   Patient denies current or recent homicidal ideation or behaviors.   Patient denies current or recent self injurious behavior or ideation.   Patient denies other safety  concerns.   Patient reports there has been no change in risk factors since their last session.     Patient reports there has been no change in protective factors since their last session.     Recommended that patient call 911 or go to the local ED should there be a change in any of these risk factors.     Appearance:   Appropriate    Eye Contact:   Good    Psychomotor Behavior: Normal    Attitude:   Cooperative  Interested   Orientation:   All   Speech    Rate / Production: Normal/ Responsive    Volume:  Normal    Mood:    Anxious     Affect:    Thoughtful, intentional    Thought Content:  Clear    Thought Form:  Coherent  Logical    Insight:    Good      Medication Review:   No changes to current psychiatric medication(s)     Medication Compliance:   Yes     Changes in Health Issues:   None reported     Chemical Use Review:   Substance Use: Chemical use reviewed, no active concerns identified      Tobacco Use: No change in amount of tobacco use since last session.  Patient declined discussion at this time    Diagnosis:  1. Major depressive disorder, recurrent episode, mild with anxious distress (H)        Collateral Reports Completed:   Not Applicable    PLAN: (Patient Tasks / Therapist Tasks / Other)  Continue using PATRIZIA, FAST and Check the Facts, along with emotional and time boundaries. Engage intentionally with supportive people, prioritizing family time. Continue spin classes. Consider personal goals.     BONG Walden 10/25/21  This note has been reviewed and I agree with the plan of care. 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    Anticipated number of session or this episode of care: 10      MeasurableTreatment Goal(s) related to diagnosis / functional impairment(s)  Goal 1: Client will experience an improvement in mood and anxiety evidenced by self report and AYDIN/PHQ scores of 5 or less    I will know I've met my goal when I'm enjoying my life more (paraphrase) .      Objective #A (Client Action)    Client will Increase interest, engagement, and pleasure in doing things  Decrease frequency and intensity of feeling down, depressed, hopeless  Improve quantity and quality of night time sleep / decrease daytime naps  Feel less tired and more energy during the day   Identify negative self-talk and behaviors: challenge core beliefs, myths, and actions  Improve concentration, focus, and mindfulness in daily activities   Feel less fidgety, restless or slow in daily activities / interpersonal interactions  Use boundaries and proactive communication in relationships.  Status:    9/13/21    Intervention(s)  Therapist will teach behavioral activation, sleep hygiene, CBT, DBT and ACT skills, proactive communication, mindfulness, grounding skills to support mood improvement and anxiety reduction.      Patient has reviewed and agreed to the above plan.      BONG Walden  9/13/21

## 2021-11-08 ENCOUNTER — VIRTUAL VISIT (OUTPATIENT)
Dept: PSYCHOLOGY | Facility: CLINIC | Age: 26
End: 2021-11-08
Payer: COMMERCIAL

## 2021-11-08 DIAGNOSIS — F33.0 MAJOR DEPRESSIVE DISORDER, RECURRENT EPISODE, MILD WITH ANXIOUS DISTRESS (H): Primary | ICD-10-CM

## 2021-11-08 PROCEDURE — 90834 PSYTX W PT 45 MINUTES: CPT | Mod: GT

## 2021-11-08 ASSESSMENT — ANXIETY QUESTIONNAIRES
1. FEELING NERVOUS, ANXIOUS, OR ON EDGE: SEVERAL DAYS
GAD7 TOTAL SCORE: 8
7. FEELING AFRAID AS IF SOMETHING AWFUL MIGHT HAPPEN: NOT AT ALL
2. NOT BEING ABLE TO STOP OR CONTROL WORRYING: SEVERAL DAYS
3. WORRYING TOO MUCH ABOUT DIFFERENT THINGS: SEVERAL DAYS
5. BEING SO RESTLESS THAT IT IS HARD TO SIT STILL: SEVERAL DAYS
4. TROUBLE RELAXING: MORE THAN HALF THE DAYS
GAD7 TOTAL SCORE: 8
GAD7 TOTAL SCORE: 8
7. FEELING AFRAID AS IF SOMETHING AWFUL MIGHT HAPPEN: NOT AT ALL
6. BECOMING EASILY ANNOYED OR IRRITABLE: MORE THAN HALF THE DAYS
8. IF YOU CHECKED OFF ANY PROBLEMS, HOW DIFFICULT HAVE THESE MADE IT FOR YOU TO DO YOUR WORK, TAKE CARE OF THINGS AT HOME, OR GET ALONG WITH OTHER PEOPLE?: SOMEWHAT DIFFICULT

## 2021-11-08 ASSESSMENT — PATIENT HEALTH QUESTIONNAIRE - PHQ9
SUM OF ALL RESPONSES TO PHQ QUESTIONS 1-9: 10
SUM OF ALL RESPONSES TO PHQ QUESTIONS 1-9: 10
10. IF YOU CHECKED OFF ANY PROBLEMS, HOW DIFFICULT HAVE THESE PROBLEMS MADE IT FOR YOU TO DO YOUR WORK, TAKE CARE OF THINGS AT HOME, OR GET ALONG WITH OTHER PEOPLE: SOMEWHAT DIFFICULT

## 2021-11-08 NOTE — PROGRESS NOTES
Progress Note    Patient Name: Joey Spears  Date: 11/8/21         Service Type: Individual      Session Start Time: 9:30 am Session End Time: 10:15 am     Session Length: 45 mins    Session #: 13    Attendees: Client attended alone    Service Modality:  Video Visit:      Provider verified identity through the following two step process.  Patient provided:  Patient is known previously to provider    Telemedicine Visit: The patient's condition can be safely assessed and treated via synchronous audio and visual telemedicine encounter.      Reason for Telemedicine Visit: Services only offered telehealth    Originating Site (Patient Location): Patient's home    Distant Site (Provider Location): Provider Remote Setting    Consent:  The patient/guardian has verbally consented to: the potential risks and benefits of telemedicine (video visit) versus in person care; bill my insurance or make self-payment for services provided; and responsibility for payment of non-covered services.     Patient would like the video invitation sent by:  My Chart    Mode of Communication:  Video Conference via Amwell    As the provider I attest to compliance with applicable laws and regulations related to telemedicine.     Treatment Plan Last Reviewed:  9/13/2021  PHQ-9 / AYDIN-7 :   Answers for HPI/ROS submitted by the patient on 10/11/2021  If you checked off any problems, how difficult have these problems made it for you to do your work, take care of things at home, or get along with other people?: Very difficult  PHQ9 TOTAL SCORE: 12  AYDIN 7 TOTAL SCORE: 13    Answers for HPI/ROS submitted by the patient on 11/8/2021  If you checked off any problems, how difficult have these problems made it for you to do your work, take care of things at home, or get along with other people?: Somewhat difficult  PHQ9 TOTAL SCORE: 10  AYDIN 7 TOTAL SCORE: 8    DATA  Interactive Complexity: No  Crisis:  No       Progress Since Last Session (Related to Symptoms / Goals / Homework):  Symptoms: Improving; ability to find balance in activities to support mood.    Homework: Partially completed working on behavioral activation      Episode of Care Goals: Satisfactory progress - ACTION (Actively working towards change); Intervened by reinforcing change plan / affirming steps taken     Current / Ongoing Stressors and Concerns:  Current: Ongoing effort maintaining boundaries. Continuing desire to focus on positive-productive relationships/activities that support wellness.     Treatment Objective(s) Addressed in This Session:   Decrease frequency and intensity of feeling down, depressed, hopeless  Use boundaries and proactive communication in relationships.     Intervention:     Motivational Interviewing  Target Behavior: maintain fitness schedule over holiday season and work time boundaries to support mental health    Stage of Change: PREPARATION (Decided to change - considering how)    MI Intervention: Expressed Empathy/Understanding, Supported Autonomy, Collaboration, Evocation, Open-ended questions and Reflections: simple and complex     Change Talk Expressed by the Patient: Desire to change Reasons to change Need to change    Provider Response to Change Talk: E - Evoked more info from patient about behavior change, A - Affirmed patient's thoughts, decisions, or attempts at behavior change, R - Reflected patient's change talk and S - Summarized patient's change talk statements     Supportive therapy: Provided active listening and validation. Supported behavioral change and validated effort toward prioritizing personal needs.    ASSESSMENT: Current Emotional / Mental Status (status of significant symptoms):   Risk status (Self / Other harm or suicidal ideation)   Patient denies current fears or concerns for personal safety.   Patient denies current or recent suicidal ideation or behaviors.   Patient denies current or  recent homicidal ideation or behaviors.   Patient denies current or recent self injurious behavior or ideation.   Patient denies other safety concerns.   Patient reports there has been no change in risk factors since their last session.     Patient reports there has been no change in protective factors since their last session.     Recommended that patient call 911 or go to the local ED should there be a change in any of these risk factors.     Appearance:   Appropriate    Eye Contact:   Good    Psychomotor Behavior: Normal    Attitude:   Cooperative  Interested   Orientation:   All   Speech    Rate / Production: Normal/ Responsive    Volume:  Normal    Mood:    Anxious     Affect:    Thoughtful, intentional    Thought Content:  Clear    Thought Form:  Coherent  Logical    Insight:    Good      Medication Review:   No changes to current psychiatric medication(s)     Medication Compliance:   Yes     Changes in Health Issues:   None reported     Chemical Use Review:   Substance Use: Chemical use reviewed, no active concerns identified      Tobacco Use: No change in amount of tobacco use since last session.  Patient declined discussion at this time    Diagnosis:  1. Major depressive disorder, recurrent episode, mild with anxious distress (H)        Collateral Reports Completed:   Not Applicable    PLAN: (Patient Tasks / Therapist Tasks / Other)  Continue using PATRIZIA, FAST and Check the Facts, along with emotional and time boundaries. Engage intentionally with supportive people, prioritizing family time. Continue prioritizing spin classes through busy holiday season.    BONG Walden 11/8/21    This note has been reviewed and I agree with the plan of care. This note is co-signed by DEE Chin, [facility], Supervisor, on:11/8/21                                                    Treatment Plan    Client's Name: Joey Spears  YOB: 1995    Date: 9/13/21    DSM-V Diagnoses: 296.31 (F33.0)  Major Depressive Disorder, Recurrent Episode, Mild With anxious distress  Psychosocial / Contextual Factors: strained mother relationship; covid fatigue; public facing role with substantial emotional burden  WHODAS: 23    Referral / Collaboration:  Referral to another professional/service is not indicated at this time..    Anticipated number of session or this episode of care: 10      MeasurableTreatment Goal(s) related to diagnosis / functional impairment(s)  Goal 1: Client will experience an improvement in mood and anxiety evidenced by self report and AYDIN/PHQ scores of 5 or less    I will know I've met my goal when I'm enjoying my life more (paraphrase) .      Objective #A (Client Action)    Client will Increase interest, engagement, and pleasure in doing things  Decrease frequency and intensity of feeling down, depressed, hopeless  Improve quantity and quality of night time sleep / decrease daytime naps  Feel less tired and more energy during the day   Identify negative self-talk and behaviors: challenge core beliefs, myths, and actions  Improve concentration, focus, and mindfulness in daily activities   Feel less fidgety, restless or slow in daily activities / interpersonal interactions  Use boundaries and proactive communication in relationships.  Status:    9/13/21    Intervention(s)  Therapist will teach behavioral activation, sleep hygiene, CBT, DBT and ACT skills, proactive communication, mindfulness, grounding skills to support mood improvement and anxiety reduction.      Patient has reviewed and agreed to the above plan.      BONG Walden  9/13/21

## 2021-11-09 ASSESSMENT — PATIENT HEALTH QUESTIONNAIRE - PHQ9: SUM OF ALL RESPONSES TO PHQ QUESTIONS 1-9: 10

## 2021-11-09 ASSESSMENT — ANXIETY QUESTIONNAIRES: GAD7 TOTAL SCORE: 8

## 2021-11-29 ENCOUNTER — VIRTUAL VISIT (OUTPATIENT)
Dept: PSYCHOLOGY | Facility: CLINIC | Age: 26
End: 2021-11-29
Payer: COMMERCIAL

## 2021-11-29 DIAGNOSIS — F33.0 MAJOR DEPRESSIVE DISORDER, RECURRENT EPISODE, MILD WITH ANXIOUS DISTRESS (H): Primary | ICD-10-CM

## 2021-11-29 PROCEDURE — 90834 PSYTX W PT 45 MINUTES: CPT | Mod: GT

## 2021-11-29 NOTE — PROGRESS NOTES
Progress Note    Patient Name: Joey Spears  Date: 11/29/21         Service Type: Individual      Session Start Time: 10:30 am Session End Time: 11:15 am     Session Length: 45 mins    Session #: 14    Attendees: Client attended alone    Service Modality:  Video Visit:      Provider verified identity through the following two step process.  Patient provided:  Patient is known previously to provider    Telemedicine Visit: The patient's condition can be safely assessed and treated via synchronous audio and visual telemedicine encounter.      Reason for Telemedicine Visit: Services only offered telehealth    Originating Site (Patient Location): Patient's home    Distant Site (Provider Location): Provider Remote Setting    Consent:  The patient/guardian has verbally consented to: the potential risks and benefits of telemedicine (video visit) versus in person care; bill my insurance or make self-payment for services provided; and responsibility for payment of non-covered services.     Patient would like the video invitation sent by:  My Chart    Mode of Communication:  Video Conference via [facility]well    As the provider I attest to compliance with applicable laws and regulations related to telemedicine.     Treatment Plan Last Reviewed:  9/13/2021  PHQ-9 / AYDIN-7 :   Answers for HPI/ROS submitted by the patient on 11/8/2021  If you checked off any problems, how difficult have these problems made it for you to do your work, take care of things at home, or get along with other people?: Somewhat difficult  PHQ9 TOTAL SCORE: 10  AYDIN 7 TOTAL SCORE: 8    DATA  Interactive Complexity: No  Crisis: No       Progress Since Last Session (Related to Symptoms / Goals / Homework):  Symptoms: Improving; ability to find balance in activities to support mood.    Homework: Partially completed working on behavioral activation      Episode of Care Goals: Satisfactory progress - ACTION (Actively  working towards change); Intervened by reinforcing change plan / affirming steps taken     Current / Ongoing Stressors and Concerns:  Current: Ongoing effort maintaining boundaries. Continuing desire to focus on positive-productive relationships/activities that support wellness.     Treatment Objective(s) Addressed in This Session:   Decrease frequency and intensity of feeling down, depressed, hopeless  Use boundaries and proactive communication in relationships.     Intervention:     Motivational Interviewing  Target Behavior: prioritize time and space for quiet throughout holiday season and new year    Stage of Change: PREPARATION (Decided to change - considering how)    MI Intervention: Expressed Empathy/Understanding, Supported Autonomy, Collaboration, Evocation, Open-ended questions and Reflections: simple and complex     Change Talk Expressed by the Patient: Desire to change Reasons to change Need to change    Provider Response to Change Talk: E - Evoked more info from patient about behavior change, A - Affirmed patient's thoughts, decisions, or attempts at behavior change, R - Reflected patient's change talk and S - Summarized patient's change talk statements     Supportive therapy: Provided active listening and validation. Supported behavioral change and validated effort toward prioritizing personal needs.    ASSESSMENT: Current Emotional / Mental Status (status of significant symptoms):   Risk status (Self / Other harm or suicidal ideation)   Patient denies current fears or concerns for personal safety.   Patient denies current or recent suicidal ideation or behaviors.   Patient denies current or recent homicidal ideation or behaviors.   Patient denies current or recent self injurious behavior or ideation.   Patient denies other safety concerns.   Patient reports there has been no change in risk factors since their last session.     Patient reports there has been no change in protective factors since their  last session.     Recommended that patient call 911 or go to the local ED should there be a change in any of these risk factors.     Appearance:   Appropriate    Eye Contact:   Good    Psychomotor Behavior: Normal    Attitude:   Cooperative  Interested   Orientation:   All   Speech    Rate / Production: Normal/ Responsive    Volume:  Normal    Mood:    Anxious     Affect:    Thoughtful, intentional    Thought Content:  Clear    Thought Form:  Coherent  Logical    Insight:    Good      Medication Review:   No changes to current psychiatric medication(s)     Medication Compliance:   Yes     Changes in Health Issues:   None reported     Chemical Use Review:   Substance Use: Chemical use reviewed, no active concerns identified      Tobacco Use: No change in amount of tobacco use since last session.  Patient declined discussion at this time    Diagnosis:  1. Major depressive disorder, recurrent episode, mild with anxious distress (H)        Collateral Reports Completed:   Not Applicable    PLAN: (Patient Tasks / Therapist Tasks / Other)  Continue using PATRIZIA, FAST and Check the Facts, along with emotional and time boundaries. Engage intentionally with supportive people, prioritizing family time. Continue prioritizing spin classes through busy holiday season and using boundaries with performance data.  Plan for down time/quiet time.     MATTIE Richards 11/29/21    This note has been reviewed and I agree with the plan of care. 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    Anticipated number of session or this episode of care: 10      MeasurableTreatment Goal(s) related to diagnosis / functional impairment(s)  Goal 1: Client will experience an improvement in mood and anxiety evidenced by self report and AYDIN/PHQ scores of 5 or less    I will know I've met my goal when I'm enjoying my life more (paraphrase) .      Objective #A (Client Action)    Client will Increase interest, engagement, and pleasure in doing things  Decrease frequency and intensity of feeling down, depressed, hopeless  Improve quantity and quality of night time sleep / decrease daytime naps  Feel less tired and more energy during the day   Identify negative self-talk and behaviors: challenge core beliefs, myths, and actions  Improve concentration, focus, and mindfulness in daily activities   Feel less fidgety, restless or slow in daily activities / interpersonal interactions  Use boundaries and proactive communication in relationships.  Status:    9/13/21    Intervention(s)  Therapist will teach behavioral activation, sleep hygiene, CBT, DBT and ACT skills, proactive communication, mindfulness, grounding skills to support mood improvement and anxiety reduction.      Patient has reviewed and agreed to the above plan.      MATTIE Richards  9/13/21

## 2021-12-13 ENCOUNTER — VIRTUAL VISIT (OUTPATIENT)
Dept: PSYCHOLOGY | Facility: CLINIC | Age: 26
End: 2021-12-13
Payer: COMMERCIAL

## 2021-12-13 DIAGNOSIS — F33.0 MAJOR DEPRESSIVE DISORDER, RECURRENT EPISODE, MILD WITH ANXIOUS DISTRESS (H): Primary | ICD-10-CM

## 2021-12-13 PROCEDURE — 90834 PSYTX W PT 45 MINUTES: CPT | Mod: GT

## 2021-12-13 ASSESSMENT — PATIENT HEALTH QUESTIONNAIRE - PHQ9
SUM OF ALL RESPONSES TO PHQ QUESTIONS 1-9: 15
SUM OF ALL RESPONSES TO PHQ QUESTIONS 1-9: 15
10. IF YOU CHECKED OFF ANY PROBLEMS, HOW DIFFICULT HAVE THESE PROBLEMS MADE IT FOR YOU TO DO YOUR WORK, TAKE CARE OF THINGS AT HOME, OR GET ALONG WITH OTHER PEOPLE: VERY DIFFICULT

## 2021-12-13 ASSESSMENT — ANXIETY QUESTIONNAIRES
GAD7 TOTAL SCORE: 14
3. WORRYING TOO MUCH ABOUT DIFFERENT THINGS: MORE THAN HALF THE DAYS
7. FEELING AFRAID AS IF SOMETHING AWFUL MIGHT HAPPEN: SEVERAL DAYS
6. BECOMING EASILY ANNOYED OR IRRITABLE: NEARLY EVERY DAY
2. NOT BEING ABLE TO STOP OR CONTROL WORRYING: MORE THAN HALF THE DAYS
4. TROUBLE RELAXING: NEARLY EVERY DAY
GAD7 TOTAL SCORE: 14
1. FEELING NERVOUS, ANXIOUS, OR ON EDGE: MORE THAN HALF THE DAYS
7. FEELING AFRAID AS IF SOMETHING AWFUL MIGHT HAPPEN: SEVERAL DAYS
GAD7 TOTAL SCORE: 14
5. BEING SO RESTLESS THAT IT IS HARD TO SIT STILL: SEVERAL DAYS

## 2021-12-13 NOTE — PROGRESS NOTES
Progress Note    Patient Name: Joey Spears  Date: 12/13/21         Service Type: Individual      Session Start Time: 9:35 am Session End Time: 10:20 am     Session Length: 45 mins    Session #: 14    Attendees: Client attended alone    Service Modality:  Video Visit:      Provider verified identity through the following two step process.  Patient provided:  Patient is known previously to provider    Telemedicine Visit: The patient's condition can be safely assessed and treated via synchronous audio and visual telemedicine encounter.      Reason for Telemedicine Visit: Patient convenience (e.g. access to timely appointments / distance to available provider)    Originating Site (Patient Location): Patient's home    Distant Site (Provider Location): Provider Remote Setting    Consent:  The patient/guardian has verbally consented to: the potential risks and benefits of telemedicine (video visit) versus in person care; bill my insurance or make self-payment for services provided; and responsibility for payment of non-covered services.     Patient would like the video invitation sent by:  My Chart    Mode of Communication:  Video Conference via [employer]well    As the provider I attest to compliance with applicable laws and regulations related to telemedicine.     Treatment Plan Last Reviewed:  12/13/2021  PHQ-9 / AYDIN-7 :   Answers for HPI/ROS submitted by the patient on 12/13/2021  If you checked off any problems, how difficult have these problems made it for you to do your work, take care of things at home, or get along with other people?: Very difficult  PHQ9 TOTAL SCORE: 15  AYDIN 7 TOTAL SCORE: 14    DATA  Interactive Complexity: No  Crisis: No       Progress Since Last Session (Related to Symptoms / Goals / Homework):  Symptoms: Increasing depression, situational related to work and holiday stressors; limited ability to find balance in activities to support mood due to  schedule.    Homework: Partially completed working on behavioral activation      Episode of Care Goals: Satisfactory progress - ACTION (Actively working towards change); Intervened by reinforcing change plan / affirming steps taken     Current / Ongoing Stressors and Concerns:  Current: Reflecting on the stressors of gift-giving and receiving. Ongoing effort maintaining boundaries/managing client relationships to create space. Continuing desire to focus on positive-productive relationships/activities that support wellness.     Treatment Objective(s) Addressed in This Session:   Decrease frequency and intensity of feeling down, depressed, hopeless  Use boundaries and proactive communication in relationships.     Intervention:     Motivational Interviewing  Target Behavior: consider using boundaries with new clients     Stage of Change: PRECONTEMPLATION (Not seeing need for change)    MI Intervention: Expressed Empathy/Understanding, Supported Autonomy, Collaboration, Evocation, Open-ended questions and Reflections: simple and complex     Change Talk Expressed by the Patient: Desire to change Reasons to change Need to change    Provider Response to Change Talk: E - Evoked more info from patient about behavior change, A - Affirmed patient's thoughts, decisions, or attempts at behavior change, R - Reflected patient's change talk and S - Summarized patient's change talk statements     Supportive therapy: Provided active listening and validation. Supported behavioral change and validated effort toward prioritizing personal needs.    ASSESSMENT: Current Emotional / Mental Status (status of significant symptoms):   Risk status (Self / Other harm or suicidal ideation)   Patient denies current fears or concerns for personal safety.   Patient denies current or recent suicidal ideation or behaviors.   Patient denies current or recent homicidal ideation or behaviors.   Patient denies current or recent self injurious behavior or  ideation.   Patient denies other safety concerns.   Patient reports there has been no change in risk factors since their last session.     Patient reports there has been no change in protective factors since their last session.     Recommended that patient call 911 or go to the local ED should there be a change in any of these risk factors.     Appearance:   Appropriate    Eye Contact:   Good    Psychomotor Behavior: Normal    Attitude:   Cooperative  Interested   Orientation:   All   Speech    Rate / Production: Normal/ Responsive    Volume:  Normal    Mood:    Anxious  Depressed   Affect:    Thoughtful, intentional    Thought Content:  Clear    Thought Form:  Coherent  Logical    Insight:    Good      Medication Review:   No changes to current psychiatric medication(s)     Medication Compliance:   Yes     Changes in Health Issues:   None reported     Chemical Use Review:   Substance Use: Chemical use reviewed, no active concerns identified      Tobacco Use: No change in amount of tobacco use since last session.  Patient declined discussion at this time    Diagnosis:  1. Major depressive disorder, recurrent episode, mild with anxious distress (H)        Collateral Reports Completed:   Not Applicable    PLAN: (Patient Tasks / Therapist Tasks / Other)  Continue using PATRIZIA, FAST and Check the Facts, along with emotional and time boundaries. Engage intentionally with supportive people, prioritizing family time. Continue prioritizing spin classes through busy holiday limiting performance data.  Plan for down time/quiet time.     MATTIE Walden 12/13/2021    This note has been reviewed and I agree with the plan of care. 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    Anticipated number of session or this episode of care: 10      MeasurableTreatment Goal(s) related to diagnosis / functional impairment(s)  Goal 1: Client will experience an improvement in mood and anxiety evidenced by self report and AYDIN/PHQ scores of 5 or less    I will know I've met my goal when I'm enjoying my life more (paraphrase) .      Objective #A (Client Action)    Client will Increase interest, engagement, and pleasure in doing things  Decrease frequency and intensity of feeling down, depressed, hopeless  Improve quantity and quality of night time sleep / decrease daytime naps  Feel less tired and more energy during the day   Identify negative self-talk and behaviors: challenge core beliefs, myths, and actions  Improve concentration, focus, and mindfulness in daily activities   Feel less fidgety, restless or slow in daily activities / interpersonal interactions  Use boundaries and proactive communication in relationships.  Status:    12/13/2021    Intervention(s)  Therapist will teach behavioral activation, sleep hygiene, CBT, DBT and ACT skills, proactive communication, mindfulness, grounding skills to support mood improvement and anxiety reduction.      Patient has reviewed and agreed to the above plan.      BONG Walden 12/13/2021

## 2021-12-14 ASSESSMENT — PATIENT HEALTH QUESTIONNAIRE - PHQ9: SUM OF ALL RESPONSES TO PHQ QUESTIONS 1-9: 15

## 2021-12-14 ASSESSMENT — ANXIETY QUESTIONNAIRES: GAD7 TOTAL SCORE: 14

## 2021-12-31 ENCOUNTER — IMMUNIZATION (OUTPATIENT)
Dept: NURSING | Facility: CLINIC | Age: 26
End: 2021-12-31
Payer: COMMERCIAL

## 2021-12-31 ENCOUNTER — VIRTUAL VISIT (OUTPATIENT)
Dept: PSYCHOLOGY | Facility: CLINIC | Age: 26
End: 2021-12-31
Payer: COMMERCIAL

## 2021-12-31 DIAGNOSIS — F33.0 MAJOR DEPRESSIVE DISORDER, RECURRENT EPISODE, MILD WITH ANXIOUS DISTRESS (H): Primary | ICD-10-CM

## 2021-12-31 PROCEDURE — 90834 PSYTX W PT 45 MINUTES: CPT | Mod: GT

## 2021-12-31 PROCEDURE — 90471 IMMUNIZATION ADMIN: CPT

## 2021-12-31 PROCEDURE — 0004A PR COVID VAC PFIZER DIL RECON 30 MCG/0.3 ML IM: CPT

## 2021-12-31 PROCEDURE — 90686 IIV4 VACC NO PRSV 0.5 ML IM: CPT

## 2021-12-31 PROCEDURE — 91300 PR COVID VAC PFIZER DIL RECON 30 MCG/0.3 ML IM: CPT

## 2021-12-31 NOTE — PROGRESS NOTES
Progress Note    Patient Name: Joey Spears  Date: 12/31/21         Service Type: Individual      Session Start Time: 12:05 pm Session End Time: 12:50 pm     Session Length: 45 mins    Session #: 15    Attendees: Client attended alone    Service Modality:  Video Visit:      Provider verified identity through the following two step process.  Patient provided:  Patient is known previously to provider    Telemedicine Visit: The patient's condition can be safely assessed and treated via synchronous audio and visual telemedicine encounter.      Reason for Telemedicine Visit: Patient convenience (e.g. access to timely appointments / distance to available provider)    Originating Site (Patient Location): Patient's home    Distant Site (Provider Location): Provider Remote Setting    Consent:  The patient/guardian has verbally consented to: the potential risks and benefits of telemedicine (video visit) versus in person care; bill my insurance or make self-payment for services provided; and responsibility for payment of non-covered services.     Patient would like the video invitation sent by:  My Chart    Mode of Communication:  Video Conference via Amwell    As the provider I attest to compliance with applicable laws and regulations related to telemedicine.     Treatment Plan Last Reviewed:  12/13/2021  PHQ-9 / AYDIN-7 :   Answers for HPI/ROS submitted by the patient on 12/13/2021  If you checked off any problems, how difficult have these problems made it for you to do your work, take care of things at home, or get along with other people?: Very difficult  PHQ9 TOTAL SCORE: 15  AYDIN 7 TOTAL SCORE: 14    DATA  Interactive Complexity: No  Crisis: No       Progress Since Last Session (Related to Symptoms / Goals / Homework):  Symptoms: Increasing depression, situational related to work and holiday stressors; limited ability to find balance in activities to support mood due to  schedule.    Homework: Partially completed working on behavioral activation      Episode of Care Goals: Satisfactory progress - ACTION (Actively working towards change); Intervened by reinforcing change plan / affirming steps taken     Current / Ongoing Stressors and Concerns:  Current: Family with Covid after [place] gathering. Ongoing effort maintaining boundaries/managing client relationships to create space. Continuing desire to focus on positive-productive relationships/activities that support wellness.     Treatment Objective(s) Addressed in This Session:   Decrease frequency and intensity of feeling down, depressed, hopeless  Use boundaries and proactive communication in relationships.     Intervention:  CBT: Discussed alternative activities to replace scrolling. Introduced grounding practices.   Supportive therapy: Provided active listening and validation. Supported behavioral change and validated effort toward prioritizing personal needs.  Motivational Interviewing  Target Behavior: engage in collab with partner regarding food planning sourcing prep cleanup    Stage of Change: PREPARATION (Decided to change - considering how)    MI Intervention: Expressed Empathy/Understanding, Supported Autonomy, Collaboration, Evocation, Open-ended questions and Reflections: simple and complex     Change Talk Expressed by the Patient: Reasons to change Need to change    Provider Response to Change Talk: E - Evoked more info from patient about behavior change      ASSESSMENT: Current Emotional / Mental Status (status of significant symptoms):   Risk status (Self / Other harm or suicidal ideation)   Patient denies current fears or concerns for personal safety.   Patient denies current or recent suicidal ideation or behaviors.   Patient denies current or recent homicidal ideation or behaviors.   Patient denies current or recent self injurious behavior or ideation.   Patient denies other safety concerns.   Patient reports  there has been no change in risk factors since their last session.     Patient reports there has been no change in protective factors since their last session.     Recommended that patient call 911 or go to the local ED should there be a change in any of these risk factors.     Appearance:   Appropriate    Eye Contact:   Good    Psychomotor Behavior: Normal    Attitude:   Cooperative  Interested   Orientation:   All   Speech    Rate / Production: Normal/ Responsive    Volume:  Normal    Mood:    Anxious  depressed   Affect:    Thoughtful, intentional    Thought Content:  Clear    Thought Form:  Coherent  Logical    Insight:    Good      Medication Review:   No changes to current psychiatric medication(s)     Medication Compliance:   Yes     Changes in Health Issues:   None reported     Chemical Use Review:   Substance Use: Chemical use reviewed, no active concerns identified      Tobacco Use: No change in amount of tobacco use since last session.  Patient declined discussion at this time    Diagnosis:  1. Major depressive disorder, recurrent episode, mild with anxious distress (H)        Collateral Reports Completed:   Not Applicable    PLAN: (Patient Tasks / Therapist Tasks / Other)  Continue using PATRIZIA, FAST and Check the Facts, along with emotional and time boundaries, engage intentionally with supportive people, prioritizing family time. Continue prioritizing spin classes through limiting performance data.  Plan for down time/quiet time. Try out grounding techniques in place of mindless scrolling.     MATTIE Walden 12/31/2021  This note has been reviewed and I agree with the plan of care. 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    Anticipated number of session or this episode of care: 10      MeasurableTreatment Goal(s) related to diagnosis / functional impairment(s)  Goal 1: Client will experience an improvement in mood and anxiety evidenced by self report and AYDIN/PHQ scores of 5 or less    I will know I've met my goal when I'm enjoying my life more (paraphrase) .      Objective #A (Client Action)    Client will Increase interest, engagement, and pleasure in doing things  Decrease frequency and intensity of feeling down, depressed, hopeless  Improve quantity and quality of night time sleep / decrease daytime naps  Feel less tired and more energy during the day   Identify negative self-talk and behaviors: challenge core beliefs, myths, and actions  Improve concentration, focus, and mindfulness in daily activities   Feel less fidgety, restless or slow in daily activities / interpersonal interactions  Use boundaries and proactive communication in relationships.  Status:    12/13/2021    Intervention(s)  Therapist will teach behavioral activation, sleep hygiene, CBT, DBT and ACT skills, proactive communication, mindfulness, grounding skills to support mood improvement and anxiety reduction.      Patient has reviewed and agreed to the above plan.      BONG Walden 12/13/2021

## 2022-01-17 ENCOUNTER — VIRTUAL VISIT (OUTPATIENT)
Dept: PSYCHOLOGY | Facility: CLINIC | Age: 27
End: 2022-01-17
Payer: COMMERCIAL

## 2022-01-17 DIAGNOSIS — F33.0 MAJOR DEPRESSIVE DISORDER, RECURRENT EPISODE, MILD WITH ANXIOUS DISTRESS (H): Primary | ICD-10-CM

## 2022-01-17 PROCEDURE — 90834 PSYTX W PT 45 MINUTES: CPT | Mod: GT

## 2022-01-17 NOTE — PROGRESS NOTES
Progress Note    Patient Name: Joey Spears  Date: 1/17/22         Service Type: Individual      Session Start Time: 9:30 am Session End Time: 10:15 am     Session Length: 45 mins    Session #: 16    Attendees: Client attended alone    Service Modality:  Video Visit:      Provider verified identity through the following two step process.  Patient provided:  Patient is known previously to provider    Telemedicine Visit: The patient's condition can be safely assessed and treated via synchronous audio and visual telemedicine encounter.      Reason for Telemedicine Visit: Patient convenience (e.g. access to timely appointments / distance to available provider)    Originating Site (Patient Location): Patient's home    Distant Site (Provider Location): Provider Remote Setting    Consent:  The patient/guardian has verbally consented to: the potential risks and benefits of telemedicine (video visit) versus in person care; bill my insurance or make self-payment for services provided; and responsibility for payment of non-covered services.     Patient would like the video invitation sent by:  My Chart    Mode of Communication:  Video Conference via Amwell    As the provider I attest to compliance with applicable laws and regulations related to telemedicine.     Treatment Plan Last Reviewed:  12/13/2021  PHQ-9 / AYDIN-7 :     Answers for HPI/ROS submitted by the patient on 1/17/2022  If you checked off any problems, how difficult have these problems made it for you to do your work, take care of things at home, or get along with other people?: Somewhat difficult  PHQ9 TOTAL SCORE: 8    DATA  Interactive Complexity: No  Crisis: No       Progress Since Last Session (Related to Symptoms / Goals / Homework):  Symptoms: Reducing depression, improving ability to find balance in activities to support mood due to schedule.    Homework: Partially completed working on asking for help,  reducing scrolling/phone time      Episode of Care Goals: Satisfactory progress - ACTION (Actively working towards change); Intervened by reinforcing change plan / affirming steps taken     Current / Ongoing Stressors and Concerns:  Current: Ongoing effort to balance time away from work with to-dos and rest. Pressure from mom to visit. Continuing desire to focus on positive-productive relationships/activities that support wellness.     Treatment Objective(s) Addressed in This Session:   Decrease frequency and intensity of feeling down, depressed, hopeless  Use boundaries and proactive communication in relationships.     Intervention:    Supportive therapy: Provided active listening and validation. Surfaced potential boundaries to consider when visiting mom. Supported behavioral change and validated effort toward prioritizing personal needs.    Motivational Interviewing  Target Behavior: focus on balance in activities, asking for help, getting needs met    Stage of Change: ACTION (Actively working towards change)    MI Intervention: Expressed Empathy/Understanding, Supported Autonomy, Collaboration, Evocation, Open-ended questions and Reflections: simple and complex     Change Talk Expressed by the Patient: Reasons to change Need to change    Provider Response to Change Talk: E - Evoked more info from patient about behavior change      ASSESSMENT: Current Emotional / Mental Status (status of significant symptoms):   Risk status (Self / Other harm or suicidal ideation)   Patient denies current fears or concerns for personal safety.   Patient denies current or recent suicidal ideation or behaviors.   Patient denies current or recent homicidal ideation or behaviors.   Patient denies current or recent self injurious behavior or ideation.   Patient denies other safety concerns.   Patient reports there has been no change in risk factors since their last session.     Patient reports there has been no change in protective  factors since their last session.     Recommended that patient call 911 or go to the local ED should there be a change in any of these risk factors.     Appearance:   Appropriate    Eye Contact:   Good    Psychomotor Behavior: Normal    Attitude:   Cooperative  Interested   Orientation:   All   Speech    Rate / Production: Normal/ Responsive    Volume:  Normal    Mood:    Anxious  depressed   Affect:    Thoughtful, intentional    Thought Content:  Clear    Thought Form:  Coherent  Logical    Insight:    Good      Medication Review:   No changes to current psychiatric medication(s)     Medication Compliance:   Yes     Changes in Health Issues:   None reported     Chemical Use Review:   Substance Use: Chemical use reviewed, no active concerns identified      Tobacco Use: No change in amount of tobacco use since last session.  Patient declined discussion at this time    Diagnosis:  1. Major depressive disorder, recurrent episode, mild with anxious distress (H)        Collateral Reports Completed:   Not Applicable    PLAN: (Patient Tasks / Therapist Tasks / Other)  Continue using PATRIZIA, FAST and Check the Facts, along with emotional and time boundaries, engage intentionally with supportive people, prioritizing family time. Continue prioritizing spin classes through limiting performance data.  Plan for down time/quiet time. Try out grounding techniques in place of mindless scrolling.     MATTIE Walden 1/17/2022  This note has been reviewed and I agree with the plan of care. This note is co-signed by DEE Richards, [facility], Supervisor, on 1/17/2022                                                    Treatment Plan    Client's Name: Joey Spears  YOB: 1995    Date: 12/13/2021    DSM-V Diagnoses: 296.31 (F33.0) Major Depressive Disorder, Recurrent Episode, Mild With anxious distress  Psychosocial / Contextual Factors: strained mother relationship; covid fatigue; public facing role  with substantial emotional burden  WHODAS: 23    Referral / Collaboration:  Referral to another professional/service is not indicated at this time..    Anticipated number of session or this episode of care: 10      MeasurableTreatment Goal(s) related to diagnosis / functional impairment(s)  Goal 1: Client will experience an improvement in mood and anxiety evidenced by self report and AYDIN/PHQ scores of 5 or less    I will know I've met my goal when I'm enjoying my life more (paraphrase) .      Objective #A (Client Action)    Client will Increase interest, engagement, and pleasure in doing things  Decrease frequency and intensity of feeling down, depressed, hopeless  Improve quantity and quality of night time sleep / decrease daytime naps  Feel less tired and more energy during the day   Identify negative self-talk and behaviors: challenge core beliefs, myths, and actions  Improve concentration, focus, and mindfulness in daily activities   Feel less fidgety, restless or slow in daily activities / interpersonal interactions  Use boundaries and proactive communication in relationships.  Status:    12/13/2021    Intervention(s)  Therapist will teach behavioral activation, sleep hygiene, CBT, DBT and ACT skills, proactive communication, mindfulness, grounding skills to support mood improvement and anxiety reduction.      Patient has reviewed and agreed to the above plan.      BONG Walden 12/13/2021

## 2022-01-18 ASSESSMENT — PATIENT HEALTH QUESTIONNAIRE - PHQ9: SUM OF ALL RESPONSES TO PHQ QUESTIONS 1-9: 8

## 2022-01-30 ENCOUNTER — HEALTH MAINTENANCE LETTER (OUTPATIENT)
Age: 27
End: 2022-01-30

## 2022-01-31 ENCOUNTER — VIRTUAL VISIT (OUTPATIENT)
Dept: PSYCHOLOGY | Facility: CLINIC | Age: 27
End: 2022-01-31
Payer: COMMERCIAL

## 2022-01-31 DIAGNOSIS — F33.0 MAJOR DEPRESSIVE DISORDER, RECURRENT EPISODE, MILD WITH ANXIOUS DISTRESS (H): Primary | ICD-10-CM

## 2022-01-31 PROCEDURE — 90834 PSYTX W PT 45 MINUTES: CPT | Mod: GT

## 2022-01-31 ASSESSMENT — PATIENT HEALTH QUESTIONNAIRE - PHQ9
SUM OF ALL RESPONSES TO PHQ QUESTIONS 1-9: 10
10. IF YOU CHECKED OFF ANY PROBLEMS, HOW DIFFICULT HAVE THESE PROBLEMS MADE IT FOR YOU TO DO YOUR WORK, TAKE CARE OF THINGS AT HOME, OR GET ALONG WITH OTHER PEOPLE: SOMEWHAT DIFFICULT
SUM OF ALL RESPONSES TO PHQ QUESTIONS 1-9: 10

## 2022-01-31 NOTE — PROGRESS NOTES
Progress Note    Patient Name: Joey Spears  Date: 1/17/22         Service Type: Individual      Session Start Time: 9:30 am Session End Time: 10:15 am     Session Length: 45 mins    Session #: 16    Attendees: Client attended alone    Service Modality:  Video Visit:      Provider verified identity through the following two step process.  Patient provided:  Patient is known previously to provider    Telemedicine Visit: The patient's condition can be safely assessed and treated via synchronous audio and visual telemedicine encounter.      Reason for Telemedicine Visit: Patient convenience (e.g. access to timely appointments / distance to available provider)    Originating Site (Patient Location): Patient's home    Distant Site (Provider Location): Provider Remote Setting    Consent:  The patient/guardian has verbally consented to: the potential risks and benefits of telemedicine (video visit) versus in person care; bill my insurance or make self-payment for services provided; and responsibility for payment of non-covered services.     Patient would like the video invitation sent by:  My Chart    Mode of Communication:  Video Conference via Amwell    As the provider I attest to compliance with applicable laws and regulations related to telemedicine.     Treatment Plan Last Reviewed:  12/13/2021  PHQ-9 / AYDIN-7 :   Answers for HPI/ROS submitted by the patient on 1/31/2022  If you checked off any problems, how difficult have these problems made it for you to do your work, take care of things at home, or get along with other people?: Somewhat difficult  PHQ9 TOTAL SCORE: 10    DATA  Interactive Complexity: No  Crisis: No       Progress Since Last Session (Related to Symptoms / Goals / Homework):  Symptoms: Low energy, disrupted sleep. Focusing on ability to find balance in activities to support mood due to schedule.    Homework: Partially completed working on asking for  help, reducing scrolling/phone time      Episode of Care Goals: Satisfactory progress - ACTION (Actively working towards change); Intervened by reinforcing change plan / affirming steps taken     Current / Ongoing Stressors and Concerns:  Current: Noticing struggle with feeling drained by social interactions, having limited opportunity to 'be authentically myself.'  Ongoing effort to balance time away from work with to-dos and rest. Pressure from mom to visit. Continuing desire to focus on positive-productive relationships/activities that support wellness.     Treatment Objective(s) Addressed in This Session:   Decrease frequency and intensity of feeling down, depressed, hopeless  Use boundaries and proactive communication in relationships.     Intervention:  CBT: Use cognitive triangle. Acknowledged support received lightening burden, increasing partnership.  Supportive therapy: Provided active listening and validation.Supported behavioral change and validated effort toward prioritizing personal needs.    Motivational Interviewing  Target Behavior: focus on balance in activities, asking for help, getting needs met, re-engage with sleep hygiene strategies    Stage of Change: ACTION (Actively working towards change)    MI Intervention: Expressed Empathy/Understanding, Supported Autonomy, Collaboration, Evocation, Open-ended questions and Reflections: simple and complex     Change Talk Expressed by the Patient: Ability to change Reasons to change Need to change Activation Taking steps    Provider Response to Change Talk: E - Evoked more info from patient about behavior change, A - Affirmed patient's thoughts, decisions, or attempts at behavior change and R - Reflected patient's change talk      ASSESSMENT: Current Emotional / Mental Status (status of significant symptoms):   Risk status (Self / Other harm or suicidal ideation)   Patient denies current fears or concerns for personal safety.   Patient denies current or  recent suicidal ideation or behaviors.   Patient denies current or recent homicidal ideation or behaviors.   Patient denies current or recent self injurious behavior or ideation.   Patient denies other safety concerns.   Patient reports there has been no change in risk factors since their last session.     Patient reports there has been no change in protective factors since their last session.     Recommended that patient call 911 or go to the local ED should there be a change in any of these risk factors.     Appearance:   Appropriate    Eye Contact:   Good    Psychomotor Behavior: Normal    Attitude:   Cooperative  Interested   Orientation:   All   Speech    Rate / Production: Normal/ Responsive    Volume:  Normal    Mood:    Anxious  depressed   Affect:    Thoughtful, intentional    Thought Content:  Clear    Thought Form:  Coherent  Logical    Insight:    Good      Medication Review:   No changes to current psychiatric medication(s)     Medication Compliance:   No inconsistent, discussed strategies to increase compliance.     Changes in Health Issues:   None reported     Chemical Use Review:   Substance Use: Chemical use reviewed, no active concerns identified      Tobacco Use: No change in amount of tobacco use since last session.  Patient declined discussion at this time    Diagnosis:  1. Major depressive disorder, recurrent episode, mild with anxious distress (H)      Collateral Reports Completed:   Not Applicable    PLAN: (Patient Tasks / Therapist Tasks / Other)  Continue using PATRIZIA, FAST and Check the Facts, along with emotional and time boundaries, engage intentionally with supportive people, prioritizing family time. Continue prioritizing spin classes through limiting performance data.  Plan for down time/quiet time. Try out grounding techniques in place of mindless scrolling.     Janae Syed, NATHANAELSW 1/31/2022  This note has been reviewed and I agree with the plan of care. This note is co-signed by  DEE Richards, [facility], Supervisor, on 1/31/2022                                                    Treatment Plan    Client's Name: Joey Spears  YOB: 1995    Date: 12/13/2021    DSM-V Diagnoses: 296.31 (F33.0) Major Depressive Disorder, Recurrent Episode, Mild With anxious distress  Psychosocial / Contextual Factors: strained mother relationship; covid fatigue; public facing role with substantial emotional burden  WHODAS: 23    Referral / Collaboration:  Referral to another professional/service is not indicated at this time..    Anticipated number of session or this episode of care: 10      MeasurableTreatment Goal(s) related to diagnosis / functional impairment(s)  Goal 1: Client will experience an improvement in mood and anxiety evidenced by self report and AYDIN/PHQ scores of 5 or less    I will know I've met my goal when I'm enjoying my life more (paraphrase) .      Objective #A (Client Action)    Client will Increase interest, engagement, and pleasure in doing things  Decrease frequency and intensity of feeling down, depressed, hopeless  Improve quantity and quality of night time sleep / decrease daytime naps  Feel less tired and more energy during the day   Identify negative self-talk and behaviors: challenge core beliefs, myths, and actions  Improve concentration, focus, and mindfulness in daily activities   Feel less fidgety, restless or slow in daily activities / interpersonal interactions  Use boundaries and proactive communication in relationships.  Status:    12/13/2021    Intervention(s)  Therapist will teach behavioral activation, sleep hygiene, CBT, DBT and ACT skills, proactive communication, mindfulness, grounding skills to support mood improvement and anxiety reduction.      Patient has reviewed and agreed to the above plan.      BONG Walden 12/13/2021

## 2022-02-01 ASSESSMENT — PATIENT HEALTH QUESTIONNAIRE - PHQ9: SUM OF ALL RESPONSES TO PHQ QUESTIONS 1-9: 10

## 2022-02-14 ENCOUNTER — VIRTUAL VISIT (OUTPATIENT)
Dept: PSYCHOLOGY | Facility: CLINIC | Age: 27
End: 2022-02-14
Payer: COMMERCIAL

## 2022-02-14 DIAGNOSIS — F33.0 MAJOR DEPRESSIVE DISORDER, RECURRENT EPISODE, MILD WITH ANXIOUS DISTRESS (H): Primary | ICD-10-CM

## 2022-02-14 PROCEDURE — 90834 PSYTX W PT 45 MINUTES: CPT | Mod: GT

## 2022-02-14 NOTE — PROGRESS NOTES
Progress Note    Patient Name: Joey Spears  Date: 2/14/22         Service Type: Individual      Session Start Time: 9:35 am Session End Time: 10:15 am     Session Length: 40 mins    Session #: 17    Attendees: Client attended alone    Service Modality:  Video Visit:      Provider verified identity through the following two step process.  Patient provided:  Patient is known previously to provider    Telemedicine Visit: The patient's condition can be safely assessed and treated via synchronous audio and visual telemedicine encounter.      Reason for Telemedicine Visit: Patient convenience (e.g. access to timely appointments / distance to available provider)    Originating Site (Patient Location): Patient's home    Distant Site (Provider Location): Provider Remote Setting    Consent:  The patient/guardian has verbally consented to: the potential risks and benefits of telemedicine (video visit) versus in person care; bill my insurance or make self-payment for services provided; and responsibility for payment of non-covered services.     Patient would like the video invitation sent by:  My Chart    Mode of Communication:  Video Conference via Amwell    As the provider I attest to compliance with applicable laws and regulations related to telemedicine.     Treatment Plan Last Reviewed:  12/13/2021  PHQ-9 / AYDIN-7 :   Answers for HPI/ROS submitted by the patient on 1/31/2022  If you checked off any problems, how difficult have these problems made it for you to do your work, take care of things at home, or get along with other people?: Somewhat difficult  PHQ9 TOTAL SCORE: 10    DATA  Interactive Complexity: No  Crisis: No       Progress Since Last Session (Related to Symptoms / Goals / Homework):  Symptoms: Inconsistent motivation for day to day tasks, disrupted sleep. Focusing on ability to find balance in activities to support mood due to schedule.    Homework:  Partially completed working on asking for help, reducing scrolling/phone time      Episode of Care Goals: Satisfactory progress - ACTION (Actively working towards change); Intervened by reinforcing change plan / affirming steps taken     Current / Ongoing Stressors and Concerns:  Current: Noticing struggle with feeling drained by social interactions, having limited opportunity to 'be authentically myself.'  Ongoing effort to balance time away from work with to-dos and rest. Pressure from mom to visit. Continuing desire to focus on positive-productive relationships/activities that support wellness.     Treatment Objective(s) Addressed in This Session:   Decrease frequency and intensity of feeling down, depressed, hopeless  Use boundaries and proactive communication in relationships.     Intervention:    Supportive therapy: Provided active listening and validation.Supported behavioral change and validated effort toward prioritizing personal needs.    Motivational Interviewing  Target Behavior: prioritize me time, alone and with girlfriends    Stage of Change: PREPARATION (Decided to change - considering how)    MI Intervention: Expressed Empathy/Understanding, Supported Autonomy, Collaboration, Evocation, Open-ended questions and Reflections: simple and complex     Change Talk Expressed by the Patient: Ability to change Reasons to change Need to change Activation Taking steps    Provider Response to Change Talk: E - Evoked more info from patient about behavior change, A - Affirmed patient's thoughts, decisions, or attempts at behavior change and R - Reflected patient's change talk      ASSESSMENT: Current Emotional / Mental Status (status of significant symptoms):   Risk status (Self / Other harm or suicidal ideation)   Patient denies current fears or concerns for personal safety.   Patient denies current or recent suicidal ideation or behaviors.   Patient denies current or recent homicidal ideation or  behaviors.   Patient denies current or recent self injurious behavior or ideation.   Patient denies other safety concerns.   Patient reports there has been no change in risk factors since their last session.     Patient reports there has been no change in protective factors since their last session.     Recommended that patient call 911 or go to the local ED should there be a change in any of these risk factors.     Appearance:   Appropriate    Eye Contact:   Good    Psychomotor Behavior: Normal    Attitude:   Cooperative  Interested   Orientation:   All   Speech    Rate / Production: Normal/ Responsive    Volume:  Normal    Mood:    Anxious  depressed   Affect:    Thoughtful, intentional    Thought Content:  Clear    Thought Form:  Coherent  Logical    Insight:    Good      Medication Review:   No changes to current psychiatric medication(s)     Medication Compliance:   Yes     Changes in Health Issues:   None reported     Chemical Use Review:   Substance Use: Chemical use reviewed, no active concerns identified      Tobacco Use: No change in amount of tobacco use since last session.  Patient declined discussion at this time    Diagnosis:  1. Major depressive disorder, recurrent episode, mild with anxious distress (H)      Collateral Reports Completed:   Not Applicable    PLAN: (Patient Tasks / Therapist Tasks / Other)  Continue using PATRIZIA, FAST and Check the Facts, along with emotional and time boundaries, engage intentionally with supportive people, prioritizing family time. Continue prioritizing spin classes through limiting performance data.  Plan for down time/quiet time. Try out grounding techniques in place of mindless scrolling. Prioritize friend/alone time     MATTIE Walden 2/14/22    This note has been reviewed and I agree with the plan of care. This note is co-signed by DEE Richards LICSW, Supervisor, on 2/15/2022                                                    Treatment  Plan    Client's Name: Joey Spears  YOB: 1995    Date: 12/13/2021    DSM-V Diagnoses: 296.31 (F33.0) Major Depressive Disorder, Recurrent Episode, Mild With anxious distress  Psychosocial / Contextual Factors: strained mother relationship; covid fatigue; public facing role with substantial emotional burden  WHODAS: 23    Referral / Collaboration:  Referral to another professional/service is not indicated at this time..    Anticipated number of session or this episode of care: 10      MeasurableTreatment Goal(s) related to diagnosis / functional impairment(s)  Goal 1: Client will experience an improvement in mood and anxiety evidenced by self report and AYDIN/PHQ scores of 5 or less    I will know I've met my goal when I'm enjoying my life more (paraphrase) .      Objective #A (Client Action)    Client will Increase interest, engagement, and pleasure in doing things  Decrease frequency and intensity of feeling down, depressed, hopeless  Improve quantity and quality of night time sleep / decrease daytime naps  Feel less tired and more energy during the day   Identify negative self-talk and behaviors: challenge core beliefs, myths, and actions  Improve concentration, focus, and mindfulness in daily activities   Feel less fidgety, restless or slow in daily activities / interpersonal interactions  Use boundaries and proactive communication in relationships.  Status:    12/13/2021    Intervention(s)  Therapist will teach behavioral activation, sleep hygiene, CBT, DBT and ACT skills, proactive communication, mindfulness, grounding skills to support mood improvement and anxiety reduction.      Patient has reviewed and agreed to the above plan.      Janae Syed, BONG 12/13/2021

## 2022-02-28 ENCOUNTER — VIRTUAL VISIT (OUTPATIENT)
Dept: PSYCHOLOGY | Facility: CLINIC | Age: 27
End: 2022-02-28
Payer: COMMERCIAL

## 2022-02-28 DIAGNOSIS — F33.0 MAJOR DEPRESSIVE DISORDER, RECURRENT EPISODE, MILD WITH ANXIOUS DISTRESS (H): Primary | ICD-10-CM

## 2022-02-28 PROCEDURE — 90834 PSYTX W PT 45 MINUTES: CPT | Mod: GT

## 2022-02-28 NOTE — PROGRESS NOTES
Progress Note    Patient Name: Joey Spears  Date: 2/28/22         Service Type: Individual      Session Start Time: 9:30 am Session End Time: 10:15 am     Session Length: 45 mins    Session #: 18    Attendees: Client attended alone    Service Modality:  Video Visit:      Provider verified identity through the following two step process.  Patient provided:  Patient is known previously to provider    Telemedicine Visit: The patient's condition can be safely assessed and treated via synchronous audio and visual telemedicine encounter.      Reason for Telemedicine Visit: Patient convenience (e.g. access to timely appointments / distance to available provider)    Originating Site (Patient Location): Patient's home    Distant Site (Provider Location): Provider Remote Setting    Consent:  The patient/guardian has verbally consented to: the potential risks and benefits of telemedicine (video visit) versus in person care; bill my insurance or make self-payment for services provided; and responsibility for payment of non-covered services.     Patient would like the video invitation sent by:  My Chart    Mode of Communication:  Video Conference via Amwell    As the provider I attest to compliance with applicable laws and regulations related to telemedicine.     Treatment Plan Last Reviewed:  12/13/2021  PHQ-9 / AYDIN-7 :   Answers for HPI/ROS submitted by the patient on 1/31/2022  If you checked off any problems, how difficult have these problems made it for you to do your work, take care of things at home, or get along with other people?: Somewhat difficult  PHQ9 TOTAL SCORE: 10    DATA  Interactive Complexity: No  Crisis: No       Progress Since Last Session (Related to Symptoms / Goals / Homework):  Symptoms: Improving mood supported by seasonal activity,  Focusing on ability to find balance in activities to support mood.    Homework: Partially completed working on asking  for help, reducing scrolling/phone time      Episode of Care Goals: Satisfactory progress - ACTION (Actively working towards change); Intervened by reinforcing change plan / affirming steps taken     Current / Ongoing Stressors and Concerns:  Current: Appreciating value of focus on time away from partner and its impact on identity. Preparing for visit to mom. Continuing desire to focus on positive-productive relationships/activities that support wellness.     Treatment Objective(s) Addressed in This Session:   Decrease frequency and intensity of feeling down, depressed, hopeless  Use boundaries and proactive communication in relationships.     Intervention:    Supportive therapy: Provided active listening and validation.Celebrated improvement in sleep routine/quality.     Motivational Interviewing  Target Behavior: prioritize me time, alone and with girlfriends, prepare boundaries and expectations for April visit with mom, maintain balance in healthy wellness practices.     Stage of Change: ACTION (Actively working towards change)    MI Intervention: Expressed Empathy/Understanding, Supported Autonomy, Collaboration, Evocation, Open-ended questions and Reflections: simple and complex     Change Talk Expressed by the Patient: Ability to change Reasons to change Need to change Activation Taking steps    Provider Response to Change Talk: E - Evoked more info from patient about behavior change, A - Affirmed patient's thoughts, decisions, or attempts at behavior change and R - Reflected patient's change talk      ASSESSMENT: Current Emotional / Mental Status (status of significant symptoms):   Risk status (Self / Other harm or suicidal ideation)   Patient denies current fears or concerns for personal safety.   Patient denies current or recent suicidal ideation or behaviors.   Patient denies current or recent homicidal ideation or behaviors.   Patient denies current or recent self injurious behavior or ideation.   Patient  denies other safety concerns.   Patient reports there has been no change in risk factors since their last session.     Patient reports there has been no change in protective factors since their last session.     Recommended that patient call 911 or go to the local ED should there be a change in any of these risk factors.     Appearance:   Appropriate    Eye Contact:   Good    Psychomotor Behavior: Normal    Attitude:   Cooperative  Interested   Orientation:   All   Speech    Rate / Production: Normal/ Responsive    Volume:  Normal    Mood:    Anxious  depressed   Affect:    Thoughtful, intentional    Thought Content:  Clear    Thought Form:  Coherent  Logical    Insight:    Good      Medication Review:   No changes to current psychiatric medication(s)     Medication Compliance:   Yes     Changes in Health Issues:   None reported     Chemical Use Review:   Substance Use: Chemical use reviewed, no active concerns identified      Tobacco Use: No change in amount of tobacco use since last session.  Patient declined discussion at this time    Diagnosis:  1. Major depressive disorder, recurrent episode, mild with anxious distress (H)      Collateral Reports Completed:   Not Applicable    PLAN: (Patient Tasks / Therapist Tasks / Other)  Continue using PATRIZIA, FAST and Check the Facts, along with emotional and time boundaries, engage intentionally with supportive people, prioritizing family time. Continue prioritizing spin classes through limiting performance data.  Plan for down time/quiet time. Try out grounding techniques in place of mindless scrolling. Prioritize friend/alone time     MATTIE Walden 2/28/22  This note has been reviewed and I agree with the plan of care. 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    Anticipated number of session or this episode of care: 10      MeasurableTreatment Goal(s) related to diagnosis / functional impairment(s)  Goal 1: Client will experience an improvement in mood and anxiety evidenced by self report and AYDIN/PHQ scores of 5 or less    I will know I've met my goal when I'm enjoying my life more (paraphrase) .      Objective #A (Client Action)    Client will Increase interest, engagement, and pleasure in doing things  Decrease frequency and intensity of feeling down, depressed, hopeless  Improve quantity and quality of night time sleep / decrease daytime naps  Feel less tired and more energy during the day   Identify negative self-talk and behaviors: challenge core beliefs, myths, and actions  Improve concentration, focus, and mindfulness in daily activities   Feel less fidgety, restless or slow in daily activities / interpersonal interactions  Use boundaries and proactive communication in relationships.  Status:    12/13/2021    Intervention(s)  Therapist will teach behavioral activation, sleep hygiene, CBT, DBT and ACT skills, proactive communication, mindfulness, grounding skills to support mood improvement and anxiety reduction.      Patient has reviewed and agreed to the above plan.      BONG Walden 12/13/2021

## 2022-03-28 ENCOUNTER — VIRTUAL VISIT (OUTPATIENT)
Dept: PSYCHOLOGY | Facility: CLINIC | Age: 27
End: 2022-03-28
Payer: COMMERCIAL

## 2022-03-28 DIAGNOSIS — F33.0 MAJOR DEPRESSIVE DISORDER, RECURRENT EPISODE, MILD WITH ANXIOUS DISTRESS (H): Primary | ICD-10-CM

## 2022-03-28 PROCEDURE — 90834 PSYTX W PT 45 MINUTES: CPT | Mod: GT

## 2022-03-28 ASSESSMENT — PATIENT HEALTH QUESTIONNAIRE - PHQ9
SUM OF ALL RESPONSES TO PHQ QUESTIONS 1-9: 5
10. IF YOU CHECKED OFF ANY PROBLEMS, HOW DIFFICULT HAVE THESE PROBLEMS MADE IT FOR YOU TO DO YOUR WORK, TAKE CARE OF THINGS AT HOME, OR GET ALONG WITH OTHER PEOPLE: SOMEWHAT DIFFICULT
SUM OF ALL RESPONSES TO PHQ QUESTIONS 1-9: 5

## 2022-03-28 NOTE — PROGRESS NOTES
[facility]                                     Progress Note    Patient Name: Joey Spears  Date: 3/28/22         Service Type: Individual      Session Start Time: 9:30 am  Session End Time: 10:15 am     Session Length: 45 min    Session #: 19    Attendees: Client attended alone    Service Modality:  Video Visit:      Provider verified identity through the following two step process.  Patient provided:  Patient is known previously to provider    Telemedicine Visit: The patient's condition can be safely assessed and treated via synchronous audio and visual telemedicine encounter.      Reason for Telemedicine Visit: Patient convenience (e.g. access to timely appointments / distance to available provider)    Originating Site (Patient Location): Patient's home    Distant Site (Provider Location): Provider Remote Setting- Home Office    Consent:  The patient/guardian has verbally consented to: the potential risks and benefits of telemedicine (video visit) versus in person care; bill my insurance or make self-payment for services provided; and responsibility for payment of non-covered services.     Patient would like the video invitation sent by:  My Chart    Mode of Communication:  Video Conference via Amwell    As the provider I attest to compliance with applicable laws and regulations related to telemedicine.    DATA  Interactive Complexity: No  Crisis: No      Progress Since Last Session (Related to Symptoms / Goals / Homework):   Symptoms: Improving mood and outlook Focusing on ability to find balance in activities to support mood.    Homework: Partially completed      Episode of Care Goals: Satisfactory progress - ACTION (Actively working towards change); Intervened by reinforcing change plan / affirming steps taken     Current / Ongoing Stressors and Concerns:   Recent workplace conflict. Preparing for visit to mom. Continuing desire to focus on positive-productive  relationships/activities that support wellness.     Treatment Objective(s) Addressed in This Session:   Use boundaries and proactive communication in relationships.     Intervention:   Supportive Therapy: Provided active listening and validation. Celebrated success with prioritizing needs  Motivational Interviewing  Target Behavior: boundaries with mom    Stage of Change: ACTION (Actively working towards change)    MI Intervention: Expressed Empathy/Understanding, Supported Autonomy, Collaboration, Evocation and Reflections: simple and complex     Change Talk Expressed by the Patient: Activation    Provider Response to Change Talk: E - Evoked more info from patient about behavior change, A - Affirmed patient's thoughts, decisions, or attempts at behavior change, R - Reflected patient's change talk and S - Summarized patient's change talk statements      Assessments completed prior to visit:  The following assessments were completed by patient for this visit:  PHQ9:   PHQ-9 SCORE 9/13/2021 10/11/2021 11/8/2021 12/13/2021 1/17/2022 1/31/2022 3/28/2022   PHQ-9 Total Score MyChart 13 (Moderate depression) 12 (Moderate depression) 10 (Moderate depression) 15 (Moderately severe depression) 8 (Mild depression) 10 (Moderate depression) 5 (Mild depression)   PHQ-9 Total Score 13 12 10 15 8 10 5     GAD7:   AYDIN-7 SCORE 5/3/2021 5/27/2021 8/19/2021 9/13/2021 10/11/2021 11/8/2021 12/13/2021   Total Score 14 (moderate anxiety) 7 (mild anxiety) 7 (mild anxiety) 8 (mild anxiety) 13 (moderate anxiety) 8 (mild anxiety) 14 (moderate anxiety)   Total Score 14 7 7 8 13 8 14     PROMIS 10-Global Health (only subscores and total score):   PROMIS-10 Scores Only 12/13/2021 3/28/2022   Global Mental Health Score 9 9   Global Physical Health Score 15 15   PROMIS TOTAL - SUBSCORES 24 24         ASSESSMENT: Current Emotional / Mental Status (status of significant symptoms):   Risk status (Self / Other harm or suicidal ideation)   Patient  denies current fears or concerns for personal safety.   Patient denies current or recent suicidal ideation or behaviors.   Patient denies current or recent homicidal ideation or behaviors.   Patient denies current or recent self injurious behavior or ideation.   Patient denies other safety concerns.   Patient reports there has been no change in risk factors since their last session.     Patient reports there has been no change in protective factors since their last session.     Recommended that patient call 911 or go to the local ED should there be a change in any of these risk factors.     Appearance:   Appropriate    Eye Contact:   Good    Psychomotor Behavior: Normal    Attitude:   Cooperative  Friendly   Orientation:   All   Speech    Rate / Production: Normal     Volume:  Normal    Mood:    Normal   Affect:    Appropriate    Thought Content:  Clear    Thought Form:  Coherent  Logical    Insight:    Good      Medication Review:   No changes to current psychiatric medication(s)     Medication Compliance:   Yes     Changes in Health Issues:   None reported     Chemical Use Review:   Substance Use: Chemical use reviewed, no active concerns identified      Tobacco Use: No current tobacco use.      Diagnosis:  1. Major depressive disorder, recurrent episode, mild with anxious distress (H)        Collateral Reports Completed:   Not Applicable    PLAN: (Patient Tasks / Therapist Tasks / Other)  Continue using PATRIZIA, FAST and Check the Facts, along with emotional and time boundaries, engage intentionally with supportive people, prioritizing family time. Continue prioritizing spin classes through limiting performance data.  Plan for down time/quiet time. Try out grounding techniques in place of mindless scrolling. Prioritize friend/alone time.  Use boundaries with mom.    DEE Walden LICSW 3/28/22  This note has been reviewed and I agree with the plan of care. 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    Anticipated number of session for this episode of care: 20-30  Anticipation frequency of session: Every other week  Anticipated Duration of each session: 38-52 minutes  Treatment plan will be reviewed in 90 days or when goals have been changed.     MeasurableTreatment Goal(s) related to diagnosis / functional impairment(s)  Goal 1: Client will experience an improvement in mood and anxiety evidenced by self report and AYDIN/PHQ scores of 5 or less    I will know I've met my goal when I'm enjoying my life more (paraphrase) .      Objective #A (Client Action)    Client will Increase interest, engagement, and pleasure in doing things  Decrease frequency and intensity of feeling down, depressed, hopeless  Improve quantity and quality of night time sleep / decrease daytime naps  Feel less tired and more energy during the day   Identify negative self-talk and behaviors: challenge core beliefs, myths, and actions  Improve concentration, focus, and mindfulness in daily activities   Feel less fidgety, restless or slow in daily activities / interpersonal interactions  Use boundaries and proactive communication in relationships.  Status:    3/28/22  Intervention(s)  Therapist will teach behavioral activation, sleep hygiene, CBT, DBT and ACT skills, proactive communication,  mindfulness, grounding skills to support mood improvement and anxiety reduction.    Patient has reviewed and agreed to the above plan.      DEE Walden,  LICSW  March 28, 2022

## 2022-03-29 ASSESSMENT — PATIENT HEALTH QUESTIONNAIRE - PHQ9: SUM OF ALL RESPONSES TO PHQ QUESTIONS 1-9: 5

## 2022-04-12 ENCOUNTER — VIRTUAL VISIT (OUTPATIENT)
Dept: PSYCHOLOGY | Facility: CLINIC | Age: 27
End: 2022-04-12
Payer: COMMERCIAL

## 2022-04-12 DIAGNOSIS — F33.0 MAJOR DEPRESSIVE DISORDER, RECURRENT EPISODE, MILD WITH ANXIOUS DISTRESS (H): Primary | ICD-10-CM

## 2022-04-12 PROCEDURE — 90834 PSYTX W PT 45 MINUTES: CPT | Mod: GT

## 2022-04-12 NOTE — PROGRESS NOTES
[facility]                                     Progress Note    Patient Name: Joey Spears  Date: 4/12/22         Service Type: Individual      Session Start Time: 8:00 am  Session End Time: 8:45 am     Session Length: 45 min    Session #: 20    Attendees: Client attended alone    Service Modality:  Video Visit:      Provider verified identity through the following two step process.  Patient provided:  Patient is known previously to provider    Telemedicine Visit: The patient's condition can be safely assessed and treated via synchronous audio and visual telemedicine encounter.      Reason for Telemedicine Visit: Patient convenience (e.g. access to timely appointments / distance to available provider)    Originating Site (Patient Location): Patient's home    Distant Site (Provider Location): Provider Remote Setting- Home Office    Consent:  The patient/guardian has verbally consented to: the potential risks and benefits of telemedicine (video visit) versus in person care; bill my insurance or make self-payment for services provided; and responsibility for payment of non-covered services.     Patient would like the video invitation sent by:  My Chart    Mode of Communication:  Video Conference via Amwell    As the provider I attest to compliance with applicable laws and regulations related to telemedicine.    DATA  Interactive Complexity: No  Crisis: No      Progress Since Last Session (Related to Symptoms / Goals / Homework):   Symptoms: Improving mood and outlook Focusing on ability to find balance in activities to support mood.    Homework: Partially completed      Episode of Care Goals: Satisfactory progress - ACTION (Actively working towards change); Intervened by reinforcing change plan / affirming steps taken     Current / Ongoing Stressors and Concerns:  Workplace stressors. Processing visit to mom, acceptance of relationship dynamic.. Continuing desire to focus on  positive-productive relationships/activities that support wellness.     Treatment Objective(s) Addressed in This Session:   Use boundaries and proactive communication in relationships.     Intervention:   Supportive Therapy: Provided active listening and validation. Celebrated success with prioritizing needs  Motivational Interviewing  Target Behavior: use boundaries with mom and acceptance of the relational dynamic    Stage of Change: MAINTENANCE (Working to maintain change, with risk of relapse)    MI Intervention: Expressed Empathy/Understanding, Supported Autonomy, Collaboration, Evocation and Reflections: simple and complex     Change Talk Expressed by the Patient: Committment to change Activation Taking steps    Provider Response to Change Talk: E - Evoked more info from patient about behavior change, A - Affirmed patient's thoughts, decisions, or attempts at behavior change, R - Reflected patient's change talk and S - Summarized patient's change talk statements      Assessments completed prior to visit:  The following assessments were completed by patient for this visit:  PHQ9:   PHQ-9 SCORE 9/13/2021 10/11/2021 11/8/2021 12/13/2021 1/17/2022 1/31/2022 3/28/2022   PHQ-9 Total Score MyChart 13 (Moderate depression) 12 (Moderate depression) 10 (Moderate depression) 15 (Moderately severe depression) 8 (Mild depression) 10 (Moderate depression) 5 (Mild depression)   PHQ-9 Total Score 13 12 10 15 8 10 5     GAD7:   AYDIN-7 SCORE 5/3/2021 5/27/2021 8/19/2021 9/13/2021 10/11/2021 11/8/2021 12/13/2021   Total Score 14 (moderate anxiety) 7 (mild anxiety) 7 (mild anxiety) 8 (mild anxiety) 13 (moderate anxiety) 8 (mild anxiety) 14 (moderate anxiety)   Total Score 14 7 7 8 13 8 14     PROMIS 10-Global Health (only subscores and total score):   PROMIS-10 Scores Only 12/13/2021 3/28/2022   Global Mental Health Score 9 9   Global Physical Health Score 15 15   PROMIS TOTAL - SUBSCORES 24 24         ASSESSMENT: Current  Emotional / Mental Status (status of significant symptoms):   Risk status (Self / Other harm or suicidal ideation)   Patient denies current fears or concerns for personal safety.   Patient denies current or recent suicidal ideation or behaviors.   Patient denies current or recent homicidal ideation or behaviors.   Patient denies current or recent self injurious behavior or ideation.   Patient denies other safety concerns.   Patient reports there has been no change in risk factors since their last session.     Patient reports there has been no change in protective factors since their last session.     Recommended that patient call 911 or go to the local ED should there be a change in any of these risk factors.     Appearance:   Appropriate    Eye Contact:   Good    Psychomotor Behavior: Normal    Attitude:   Cooperative  Friendly   Orientation:   All   Speech    Rate / Production: Normal     Volume:  Normal    Mood:    Normal   Affect:    Appropriate    Thought Content:  Clear    Thought Form:  Coherent  Logical    Insight:    Good      Medication Review:   No changes to current psychiatric medication(s)     Medication Compliance:   Yes     Changes in Health Issues:   None reported     Chemical Use Review:   Substance Use: Chemical use reviewed, no active concerns identified      Tobacco Use: No current tobacco use.      Diagnosis:  1. Major depressive disorder, recurrent episode, mild with anxious distress (H)        Collateral Reports Completed:   Not Applicable    PLAN: (Patient Tasks / Therapist Tasks / Other)  Continue using PATRIZIA, FAST and Check the Facts, along with emotional and time boundaries, engage intentionally with supportive people, prioritizing family time. Continue prioritizing spin classes through limiting performance data.  Plan for down time/quiet time. Try out grounding techniques in place of mindless scrolling. Prioritize friend/alone time.  Continue using boundaries with momParker Cardoso  DEE Syed [facility] Center[facility]KI 4/12/22  This note has been reviewed and I agree with the plan of care. 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    Anticipated number of session for this episode of care: 20-30  Anticipation frequency of session: Every other week  Anticipated Duration of each session: 38-52 minutes  Treatment plan will be reviewed in 90 days or when goals have been changed.     MeasurableTreatment Goal(s) related to diagnosis / functional impairment(s)  Goal 1: Client will experience an improvement in mood and anxiety evidenced by self report and AYDIN/PHQ scores of 5 or less    I will know I've met my goal when I'm enjoying my life more (paraphrase) .      Objective #A (Client Action)    Client will Increase interest, engagement, and pleasure in doing things  Decrease frequency and intensity of feeling down, depressed, hopeless  Improve quantity and quality of night time sleep / decrease daytime naps  Feel less tired and more energy during the day   Identify negative self-talk and behaviors: challenge core beliefs, myths, and actions  Improve concentration, focus, and mindfulness in daily activities   Feel less fidgety, restless or slow in daily activities / interpersonal interactions  Use boundaries and proactive communication in relationships.  Status:     3/28/22  Intervention(s)  Therapist will teach behavioral activation, sleep hygiene, CBT, DBT and ACT skills, proactive communication, mindfulness, grounding skills to support mood improvement and anxiety reduction.    Patient has reviewed and agreed to the above plan.      DEE Walden,  LICSW  March 28, 2022

## 2022-04-25 ENCOUNTER — APPOINTMENT (OUTPATIENT)
Dept: PSYCHOLOGY | Facility: CLINIC | Age: 27
End: 2022-04-25
Payer: COMMERCIAL

## 2022-05-09 ENCOUNTER — VIRTUAL VISIT (OUTPATIENT)
Dept: PSYCHOLOGY | Facility: CLINIC | Age: 27
End: 2022-05-09
Payer: COMMERCIAL

## 2022-05-09 DIAGNOSIS — F33.0 MAJOR DEPRESSIVE DISORDER, RECURRENT EPISODE, MILD WITH ANXIOUS DISTRESS (H): Primary | ICD-10-CM

## 2022-05-09 PROCEDURE — 90834 PSYTX W PT 45 MINUTES: CPT | Mod: GT

## 2022-05-09 ASSESSMENT — PATIENT HEALTH QUESTIONNAIRE - PHQ9
SUM OF ALL RESPONSES TO PHQ QUESTIONS 1-9: 7
10. IF YOU CHECKED OFF ANY PROBLEMS, HOW DIFFICULT HAVE THESE PROBLEMS MADE IT FOR YOU TO DO YOUR WORK, TAKE CARE OF THINGS AT HOME, OR GET ALONG WITH OTHER PEOPLE: SOMEWHAT DIFFICULT
SUM OF ALL RESPONSES TO PHQ QUESTIONS 1-9: 7

## 2022-05-09 NOTE — PROGRESS NOTES
[facility]                                     Progress Note    Patient Name: Joey Spears  Date: 5/9/22         Service Type: Individual      Session Start Time: 8:30 am  Session End Time: 9:15 am     Session Length: 45 min    Session #: 21    Attendees: Client attended alone    Service Modality:  Video Visit:      Provider verified identity through the following two step process.  Patient provided:  Patient is known previously to provider    Telemedicine Visit: The patient's condition can be safely assessed and treated via synchronous audio and visual telemedicine encounter.      Reason for Telemedicine Visit: Patient convenience (e.g. access to timely appointments / distance to available provider)    Originating Site (Patient Location): Patient's home    Distant Site (Provider Location): Provider Remote Setting- Home Office    Consent:  The patient/guardian has verbally consented to: the potential risks and benefits of telemedicine (video visit) versus in person care; bill my insurance or make self-payment for services provided; and responsibility for payment of non-covered services.     Patient would like the video invitation sent by:  My Chart    Mode of Communication:  Video Conference via Amwell    As the provider I attest to compliance with applicable laws and regulations related to telemedicine.    DATA  Interactive Complexity: No  Crisis: No      Progress Since Last Session (Related to Symptoms / Goals / Homework):   Symptoms: Stable mood and anxiety Focusing on ability to find balance in activities to support mood.    Homework: Achieved / completed to satisfaction      Episode of Care Goals: Satisfactory progress - ACTION (Actively working towards change); Intervened by reinforcing change plan / affirming steps taken     Current / Ongoing Stressors and Concerns:  Workplace stressors. Move to month to month lease. Upcoming schedule lacking down time. Noticing trigger of  waiting for partner to come home connecting back to experience of mother's inconsistency. Continuing desire to focus on positive-productive relationships/activities that support wellness.     Treatment Objective(s) Addressed in This Session:   Use boundaries and proactive communication in relationships.     Intervention:   Supportive Therapy: Provided active listening and validation. Celebrated success with prioritizing needs  Motivational Interviewing  Target Behavior: continue using  boundaries and proactive communication with friends and family, identify and practice necessary level of communication to address needs    Stage of Change: MAINTENANCE (Working to maintain change, with risk of relapse)    MI Intervention: Expressed Empathy/Understanding, Supported Autonomy, Collaboration, Evocation and Reflections: simple and complex     Change Talk Expressed by the Patient: Need to change Committment to change Activation Taking steps    Provider Response to Change Talk: E - Evoked more info from patient about behavior change, A - Affirmed patient's thoughts, decisions, or attempts at behavior change, R - Reflected patient's change talk and S - Summarized patient's change talk statements      Assessments completed prior to visit:  The following assessments were completed by patient for this visit:    PHQ9:   PHQ-9 SCORE 10/11/2021 11/8/2021 12/13/2021 1/17/2022 1/31/2022 3/28/2022 5/9/2022   PHQ-9 Total Score MyChart 12 (Moderate depression) 10 (Moderate depression) 15 (Moderately severe depression) 8 (Mild depression) 10 (Moderate depression) 5 (Mild depression) 7 (Mild depression)   PHQ-9 Total Score 12 10 15 8 10 5 7     GAD7:   AYDIN-7 SCORE 5/3/2021 5/27/2021 8/19/2021 9/13/2021 10/11/2021 11/8/2021 12/13/2021   Total Score 14 (moderate anxiety) 7 (mild anxiety) 7 (mild anxiety) 8 (mild anxiety) 13 (moderate anxiety) 8 (mild anxiety) 14 (moderate anxiety)   Total Score 14 7 7 8 13 8 14     PROMIS 10-Global Health  (only subscores and total score):   PROMIS-10 Scores Only 12/13/2021 3/28/2022   Global Mental Health Score 9 9   Global Physical Health Score 15 15   PROMIS TOTAL - SUBSCORES 24 24         ASSESSMENT: Current Emotional / Mental Status (status of significant symptoms):   Risk status (Self / Other harm or suicidal ideation)   Patient denies current fears or concerns for personal safety.   Patient denies current or recent suicidal ideation or behaviors.   Patient denies current or recent homicidal ideation or behaviors.   Patient denies current or recent self injurious behavior or ideation.   Patient denies other safety concerns.   Patient reports there has been no change in risk factors since their last session.     Patient reports there has been no change in protective factors since their last session.     Recommended that patient call 911 or go to the local ED should there be a change in any of these risk factors.     Appearance:   Appropriate    Eye Contact:   Good    Psychomotor Behavior: Normal    Attitude:   Cooperative  Friendly   Orientation:   All   Speech    Rate / Production: Normal     Volume:  Normal    Mood:    Normal, anxious   Affect:    Appropriate    Thought Content:  Clear    Thought Form:  Coherent  Logical    Insight:    Good      Medication Review:   No changes to current psychiatric medication(s)     Medication Compliance:   Yes     Changes in Health Issues:   None reported     Chemical Use Review:   Substance Use: Chemical use reviewed, no active concerns identified      Tobacco Use: No current tobacco use.      Diagnosis:  1. Major depressive disorder, recurrent episode, mild with anxious distress (H)        Collateral Reports Completed:   Not Applicable    PLAN: (Patient Tasks / Therapist Tasks / Other)  Continue using PATRIZIA, FAST and Check the Facts, along with emotional and time boundaries, engage intentionally with supportive people, prioritizing family time. Plan for down time/quiet  time. Use grounding techniques in place of scrolling. Continue using boundaries with mom, friends, partner.    DEE Walden LICSW 5/9/22  This note has been reviewed and I agree with the plan of care. 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    Anticipated number of session for this episode of care: 20-30  Anticipation frequency of session: Every other week  Anticipated Duration of each session: 38-52 minutes  Treatment plan will be reviewed in 90 days or when goals have been changed.     MeasurableTreatment Goal(s) related to diagnosis / functional impairment(s)  Goal 1: Client will experience an improvement in mood and anxiety evidenced by self report and AYDIN/PHQ scores of 5 or less    I will know I've met my goal when I'm enjoying my life more (paraphrase) .      Objective #A (Client Action)    Client will Increase interest, engagement, and pleasure in doing things  Decrease frequency and intensity of feeling down, depressed, hopeless  Improve quantity and quality of night time sleep / decrease daytime naps  Feel less tired and more energy during the day   Identify negative self-talk and behaviors: challenge core beliefs, myths, and actions  Improve concentration, focus, and mindfulness in daily activities   Feel less fidgety, restless or slow in daily  activities / interpersonal interactions  Use boundaries and proactive communication in relationships.  Status:    3/28/22  Intervention(s)  Therapist will teach behavioral activation, sleep hygiene, CBT, DBT and ACT skills, proactive communication, mindfulness, grounding skills to support mood improvement and anxiety reduction.    Patient has reviewed and agreed to the above plan.      DEE Walden,  LICSW  March 28, 2022

## 2022-05-10 ASSESSMENT — PATIENT HEALTH QUESTIONNAIRE - PHQ9: SUM OF ALL RESPONSES TO PHQ QUESTIONS 1-9: 7

## 2022-05-16 ENCOUNTER — OFFICE VISIT (OUTPATIENT)
Dept: FAMILY MEDICINE | Facility: CLINIC | Age: 27
End: 2022-05-16
Payer: COMMERCIAL

## 2022-05-16 VITALS
TEMPERATURE: 98.6 F | OXYGEN SATURATION: 95 % | SYSTOLIC BLOOD PRESSURE: 108 MMHG | WEIGHT: 152 LBS | RESPIRATION RATE: 14 BRPM | HEIGHT: 64 IN | HEART RATE: 96 BPM | DIASTOLIC BLOOD PRESSURE: 68 MMHG | BODY MASS INDEX: 25.95 KG/M2

## 2022-05-16 DIAGNOSIS — Z11.4 SCREENING FOR HIV (HUMAN IMMUNODEFICIENCY VIRUS): ICD-10-CM

## 2022-05-16 DIAGNOSIS — Z00.00 ROUTINE GENERAL MEDICAL EXAMINATION AT A HEALTH CARE FACILITY: Primary | ICD-10-CM

## 2022-05-16 DIAGNOSIS — F41.9 ANXIETY: ICD-10-CM

## 2022-05-16 DIAGNOSIS — L98.9 SKIN LESION: ICD-10-CM

## 2022-05-16 DIAGNOSIS — Z12.83 SKIN CANCER SCREENING: ICD-10-CM

## 2022-05-16 DIAGNOSIS — F33.1 MODERATE EPISODE OF RECURRENT MAJOR DEPRESSIVE DISORDER (H): ICD-10-CM

## 2022-05-16 PROCEDURE — 99214 OFFICE O/P EST MOD 30 MIN: CPT | Mod: 25 | Performed by: FAMILY MEDICINE

## 2022-05-16 PROCEDURE — 87389 HIV-1 AG W/HIV-1&-2 AB AG IA: CPT | Performed by: FAMILY MEDICINE

## 2022-05-16 PROCEDURE — 36415 COLL VENOUS BLD VENIPUNCTURE: CPT | Performed by: FAMILY MEDICINE

## 2022-05-16 PROCEDURE — 99395 PREV VISIT EST AGE 18-39: CPT | Performed by: FAMILY MEDICINE

## 2022-05-16 RX ORDER — BUPROPION HYDROCHLORIDE 300 MG/1
300 TABLET ORAL EVERY MORNING
Qty: 90 TABLET | Refills: 3 | Status: SHIPPED | OUTPATIENT
Start: 2022-05-16 | End: 2022-06-20

## 2022-05-16 RX ORDER — BUSPIRONE HYDROCHLORIDE 10 MG/1
10 TABLET ORAL 2 TIMES DAILY
Qty: 180 TABLET | Refills: 3 | Status: SHIPPED | OUTPATIENT
Start: 2022-05-16 | End: 2023-08-08

## 2022-05-16 ASSESSMENT — ENCOUNTER SYMPTOMS
HEMATURIA: 0
HEARTBURN: 0
JOINT SWELLING: 0
DIZZINESS: 0
CONSTIPATION: 0
ABDOMINAL PAIN: 0
WEAKNESS: 0
FREQUENCY: 0
PARESTHESIAS: 0
SORE THROAT: 0
BREAST MASS: 0
COUGH: 0
ARTHRALGIAS: 1
CHILLS: 0
MYALGIAS: 1
NERVOUS/ANXIOUS: 1
DYSURIA: 0
SHORTNESS OF BREATH: 0
EYE PAIN: 0
DIARRHEA: 0
FEVER: 0
HEMATOCHEZIA: 0
NAUSEA: 0
HEADACHES: 0
PALPITATIONS: 0

## 2022-05-16 NOTE — PROGRESS NOTES
SUBJECTIVE:   CC: Joey Spears is an 27 year old woman who presents for preventive health visit.       {Healthy Habits:     Getting at least 3 servings of Calcium per day:  Yes    Bi-annual eye exam:  Yes    Dental care twice a year:  NO    Sleep apnea or symptoms of sleep apnea:  None    Diet:  Regular (no restrictions)    Frequency of exercise:  4-5 days/week    Duration of exercise:  45-60 minutes    Taking medications regularly:  Yes    Medication side effects:  None    PHQ-2 Total Score: 2    Additional concerns today:  Yes    Has a new skin spot she would like looked at. It protrudes, so she has shaved it accidentally a few times.     Doing well in terms of her anxiety and depression.     Things have been busy at her job. Works as . It's wedding season.       Today's PHQ-2 Score:   PHQ-2 (  [employer]) 2022   Q1: Little interest or pleasure in doing things 1   Q2: Feeling down, depressed or hopeless 1   PHQ-2 Score 2   PHQ-2 Total Score (12-17 Years)- Positive if 3 or more points; Administer PHQ-A if positive -   Q1: Little interest or pleasure in doing things Several days   Q2: Feeling down, depressed or hopeless Several days   PHQ-2 Score 2       Abuse: Current or Past (Physical, Sexual or Emotional) - No  Do you feel safe in your environment? Yes        Social History     Tobacco Use     Smoking status: Former Smoker     Packs/day: 0.50     Years: 10.00     Pack years: 5.00     Types: Cigarettes, Other     Quit date: 2020     Years since quittin.1     Smokeless tobacco: Current User     Tobacco comment: Quit cigarettes- currently using vape   Substance Use Topics     Alcohol use: Yes     If you drink alcohol do you typically have >3 drinks per day or >7 drinks per week? Yes      Alcohol Use 2022   Prescreen: >3 drinks/day or >7 drinks/week? Yes   Prescreen: >3 drinks/day or >7 drinks/week? -   AUDIT SCORE  9     AUDIT - Alcohol Use Disorders Identification Test -  Reproduced from the World Health Organization Audit 2001 (Second Edition) 5/16/2022   1.  How often do you have a drink containing alcohol? 4 or more times a week   2.  How many drinks containing alcohol do you have on a typical day when you are drinking? 1 or 2   3.  How often do you have five or more drinks on one occasion? Monthly   4.  How often during the last year have you found that you were not able to stop drinking once you had started? Never   5.  How often during the last year have you failed to do what was normally expected of you because of drinking? Never   6.  How often during the last year have you needed a first drink in the morning to get yourself going after a heavy drinking session? Never   7.  How often during the last year have you had a feeling of guilt or remorse after drinking? Monthly   8.  How often during the last year have you been unable to remember what happened the night before because of your drinking? Less than monthly   9.  Have you or someone else been injured because of your drinking? No   10. Has a relative, friend, doctor or other health care worker been concerned about your drinking or suggested you cut down? No   TOTAL SCORE 9       Reviewed orders with patient.  Reviewed health maintenance and updated orders accordingly - Yes       Breast Cancer Screening:    FHS-7:   Breast CA Risk Assessment (FHS-7) 5/16/2022   Did any of your first-degree relatives have breast or ovarian cancer? No   Did any of your relatives have bilateral breast cancer? No   Did any man in your family have breast cancer? No   Did any woman in your family have breast and ovarian cancer? No   Did any woman in your family have breast cancer before age 50 y? No   Do you have 2 or more relatives with breast and/or ovarian cancer? No   Do you have 2 or more relatives with breast and/or bowel cancer? No          History of abnormal Pap smear:    PAP / HPV 2/8/2021   PAP (Historical) NIL     Reviewed and  updated as needed this visit by clinical staff   Tobacco  Allergies  Meds   Med Hx  Surg Hx  Fam Hx  Soc Hx          Reviewed and updated as needed this visit by Provider                        Review of Systems   Constitutional: Negative for chills and fever.   HENT: Positive for congestion. Negative for ear pain, hearing loss and sore throat.    Eyes: Negative for pain and visual disturbance.   Respiratory: Negative for cough and shortness of breath.    Cardiovascular: Negative for chest pain, palpitations and peripheral edema.   Gastrointestinal: Negative for abdominal pain, constipation, diarrhea, heartburn, hematochezia and nausea.   Breasts:  Negative for tenderness, breast mass and discharge.   Genitourinary: Negative for dysuria, frequency, genital sores, hematuria, pelvic pain, urgency, vaginal bleeding and vaginal discharge.   Musculoskeletal: Positive for arthralgias and myalgias. Negative for joint swelling.   Skin: Negative for rash.   Neurological: Negative for dizziness, weakness, headaches and paresthesias.   Psychiatric/Behavioral: Positive for mood changes. The patient is nervous/anxious.         OBJECTIVE:   /68 (BP Location: Left arm, Patient Position: Sitting, Cuff Size: Adult Regular)   Pulse 96   Temp 98.6  F (37  C) (Temporal)   Resp 14   Wt 68.9 kg (152 lb)   LMP 04/02/2022   SpO2 95%   BMI 26.50 kg/m    Physical Exam  GENERAL: healthy, alert and no distress  EYES: Eyes grossly normal to inspection, PERRL and conjunctivae and sclerae normal  HENT: ear canals and TM's normal, nose and mouth without ulcers or lesions  NECK: no adenopathy, no asymmetry, masses, or scars and thyroid normal to palpation  RESP: lungs clear to auscultation - no rales, rhonchi or wheezes  CV: regular rate and rhythm, normal S1 S2, no S3 or S4, no murmur, click or rub, no peripheral edema and peripheral pulses strong  ABDOMEN: soft, nontender, no hepatosplenomegaly, no masses and bowel sounds  normal  MS: no gross musculoskeletal defects noted, no edema  SKIN: no suspicious lesions or rashes  NEURO: Normal strength and tone, mentation intact and speech normal  PSYCH: mentation appears normal, affect normal/bright    Diagnostic Test Results:  Labs reviewed in Epic    ASSESSMENT/PLAN:       ICD-10-CM    1. Routine general medical examination at a health care facility  Z00.00    2. Screening for HIV (human immunodeficiency virus)  Z11.4    3. Moderate episode of recurrent major depressive disorder (H)  F33.1 buPROPion (WELLBUTRIN XL) 300 MG 24 hr tablet     busPIRone (BUSPAR) 10 MG tablet   4. Anxiety  F41.9 buPROPion (WELLBUTRIN XL) 300 MG 24 hr tablet     busPIRone (BUSPAR) 10 MG tablet   5. Skin cancer screening  Z12.83 Adult Dermatology Referral     Routine preventive visit  Healthy  Pap is up to date -- due in 2024  HIV screening for low risk adult today     Moderate episode of recurrent major depressive disorder (H)  Anxiety   Doing well  continue wellbutrin 300mg daily and buspar 10mg twice daily. Refilled meds today   Continues to see her therapist     Improved with increase in wellbutrin dose to 300mg daily. She wants to continue this dose of medication. Plan follow up in 6 months.   - busPIRone (BUSPAR) 10 MG tablet  Dispense: 180 tablet; Refill: 1  - buPROPion (WELLBUTRIN XL) 300 MG 24 hr tablet  Dispense: 90 tablet; Refill: 1  Uncontrolled. Tolerating wellbutrin. Will increase dose to 300mg daily. She has found wellbutrin helpful in the past and her mom also found it effective. She did not respond to fluoxetine when it was started and was hospitalized with suicidal ideation after fluoxetine was started when she was in high school. She is very reluctant to try other medications.   Has therapy appt scheduled in May      Skin lesion  Skin cancer screening  Derm referral provided.        COUNSELING:  Reviewed preventive health counseling, as reflected in patient instructions    Estimated body mass  "index is 26.5 kg/m  as calculated from the following:    Height as of 2/8/21: 1.613 m (5' 3.5\").    Weight as of this encounter: 68.9 kg (152 lb).       She reports that she quit smoking about 2 years ago. Her smoking use included cigarettes and other. She has a 5.00 pack-year smoking history. She uses smokeless tobacco.      Counseling Resources:  ATP IV Guidelines  Pooled Cohorts Equation Calculator  Breast Cancer Risk Calculator  BRCA-Related Cancer Risk Assessment: FHS-7 Tool  FRAX Risk Assessment  ICSI Preventive Guidelines  Dietary Guidelines for Americans, 2010  USDA's MyPlate  ASA Prophylaxis  Lung CA Screening    Kaci Villar MD  [facility]  "

## 2022-05-16 NOTE — PATIENT INSTRUCTIONS

## 2022-05-17 LAB — HIV 1+2 AB+HIV1 P24 AG SERPL QL IA: NONREACTIVE

## 2022-05-18 NOTE — RESULT ENCOUNTER NOTE
Excellent! Please call or send a [employer] message if you have any questions. Kaci Villar M.D.

## 2022-05-30 ASSESSMENT — ANXIETY QUESTIONNAIRES
6. BECOMING EASILY ANNOYED OR IRRITABLE: MORE THAN HALF THE DAYS
1. FEELING NERVOUS, ANXIOUS, OR ON EDGE: MORE THAN HALF THE DAYS
7. FEELING AFRAID AS IF SOMETHING AWFUL MIGHT HAPPEN: NOT AT ALL
2. NOT BEING ABLE TO STOP OR CONTROL WORRYING: SEVERAL DAYS
7. FEELING AFRAID AS IF SOMETHING AWFUL MIGHT HAPPEN: NOT AT ALL
GAD7 TOTAL SCORE: 9
4. TROUBLE RELAXING: MORE THAN HALF THE DAYS
3. WORRYING TOO MUCH ABOUT DIFFERENT THINGS: SEVERAL DAYS
5. BEING SO RESTLESS THAT IT IS HARD TO SIT STILL: SEVERAL DAYS
GAD7 TOTAL SCORE: 9
8. IF YOU CHECKED OFF ANY PROBLEMS, HOW DIFFICULT HAVE THESE MADE IT FOR YOU TO DO YOUR WORK, TAKE CARE OF THINGS AT HOME, OR GET ALONG WITH OTHER PEOPLE?: SOMEWHAT DIFFICULT
GAD7 TOTAL SCORE: 9

## 2022-05-30 ASSESSMENT — PATIENT HEALTH QUESTIONNAIRE - PHQ9
10. IF YOU CHECKED OFF ANY PROBLEMS, HOW DIFFICULT HAVE THESE PROBLEMS MADE IT FOR YOU TO DO YOUR WORK, TAKE CARE OF THINGS AT HOME, OR GET ALONG WITH OTHER PEOPLE: SOMEWHAT DIFFICULT
SUM OF ALL RESPONSES TO PHQ QUESTIONS 1-9: 9
SUM OF ALL RESPONSES TO PHQ QUESTIONS 1-9: 9

## 2022-05-31 ENCOUNTER — VIRTUAL VISIT (OUTPATIENT)
Dept: PSYCHOLOGY | Facility: CLINIC | Age: 27
End: 2022-05-31
Payer: COMMERCIAL

## 2022-05-31 DIAGNOSIS — F33.0 MAJOR DEPRESSIVE DISORDER, RECURRENT EPISODE, MILD WITH ANXIOUS DISTRESS (H): Primary | ICD-10-CM

## 2022-05-31 PROCEDURE — 90834 PSYTX W PT 45 MINUTES: CPT | Mod: GT

## 2022-05-31 NOTE — PROGRESS NOTES
[facility]                                     Progress Note    Patient Name: Joey Spears  Date: 5/31/22         Service Type: Individual      Session Start Time: 8:00 am  Session End Time: 8:45 am     Session Length: 45 min    Session #: 22    Attendees: Client attended alone    Service Modality:  Video Visit:      Provider verified identity through the following two step process.  Patient provided:  Patient is known previously to provider    Telemedicine Visit: The patient's condition can be safely assessed and treated via synchronous audio and visual telemedicine encounter.      Reason for Telemedicine Visit: Patient convenience (e.g. access to timely appointments / distance to available provider)    Originating Site (Patient Location): Patient's home    Distant Site (Provider Location): Provider Remote Setting- Home Office    Consent:  The patient/guardian has verbally consented to: the potential risks and benefits of telemedicine (video visit) versus in person care; bill my insurance or make self-payment for services provided; and responsibility for payment of non-covered services.     Patient would like the video invitation sent by:  My Chart    Mode of Communication:  Video Conference via [employer]    As the provider I attest to compliance with applicable laws and regulations related to telemedicine.    DATA  Interactive Complexity: No  Crisis: No      Progress Since Last Session (Related to Symptoms / Goals / Homework):   Symptoms: Stable mood and anxiety Focusing on finding balance in activities to support mood.    Homework: Achieved / completed to satisfaction      Episode of Care Goals: Satisfactory progress - ACTION (Actively working towards change); Intervened by reinforcing change plan / affirming steps taken     Current / Ongoing Stressors and Concerns:  Exploring relationship with alcohol. Negative work team dynamics influenced by coworker's health and functioning.  Managing schedule balance-down time. Preparing for a visit from mother, identifying strategies for success.. Using technology to support relationship dynamic. Continuing desire to focus on positive-productive relationships/activities that support wellness.     Treatment Objective(s) Addressed in This Session:   Use boundaries and proactive communication in relationships.     Intervention:   Supportive Therapy: Provided active listening and validation. Celebrated success with prioritizing needs  Motivational Interviewing  Target Behavior: continue using  boundaries, proactive communication skills to get needs met and proactive communication with friends and family, identify and practice necessary level of communication to address needs    Stage of Change: ACTION (Actively working towards change)    MI Intervention: Expressed Empathy/Understanding, Supported Autonomy, Collaboration, Evocation, Open-ended questions, Reflections: simple and complex and Change talk (evoked)     Change Talk Expressed by the Patient: Ability to change Reasons to change Need to change Committment to change Activation Taking steps    Provider Response to Change Talk: E - Evoked more info from patient about behavior change, A - Affirmed patient's thoughts, decisions, or attempts at behavior change, R - Reflected patient's change talk and S - Summarized patient's change talk statements      Assessments completed prior to visit:  The following assessments were completed by patient for this visit:    PHQ9:   PHQ-9 SCORE 11/8/2021 12/13/2021 1/17/2022 1/31/2022 3/28/2022 5/9/2022 5/30/2022   PHQ-9 Total Score MyChart 10 (Moderate depression) 15 (Moderately severe depression) 8 (Mild depression) 10 (Moderate depression) 5 (Mild depression) 7 (Mild depression) 9 (Mild depression)   PHQ-9 Total Score 10 15 8 10 5 7 9     GAD7:   AYDIN-7 SCORE 5/27/2021 8/19/2021 9/13/2021 10/11/2021 11/8/2021 12/13/2021 5/30/2022   Total Score 7 (mild anxiety) 7 (mild  anxiety) 8 (mild anxiety) 13 (moderate anxiety) 8 (mild anxiety) 14 (moderate anxiety) 9 (mild anxiety)   Total Score 7 7 8 13 8 14 9     PROMIS 10-Global Health (only subscores and total score):   PROMIS-10 Scores Only 12/13/2021 3/28/2022   Global Mental Health Score 9 9   Global Physical Health Score 15 15   PROMIS TOTAL - SUBSCORES 24 24         ASSESSMENT: Current Emotional / Mental Status (status of significant symptoms):   Risk status (Self / Other harm or suicidal ideation)   Patient denies current fears or concerns for personal safety.   Patient denies current or recent suicidal ideation or behaviors.   Patient denies current or recent homicidal ideation or behaviors.   Patient denies current or recent self injurious behavior or ideation.   Patient denies other safety concerns.   Patient reports there has been no change in risk factors since their last session.     Patient reports there has been no change in protective factors since their last session.     Recommended that patient call 911 or go to the local ED should there be a change in any of these risk factors.     Appearance:   Appropriate    Eye Contact:   Good    Psychomotor Behavior: Normal    Attitude:   Cooperative  Friendly   Orientation:   All   Speech    Rate / Production: Normal     Volume:  Normal    Mood:    Normal, anxious   Affect:    Appropriate    Thought Content:  Clear    Thought Form:  Coherent  Logical    Insight:    Good      Medication Review:   No changes to current psychiatric medication(s)     Medication Compliance:   Yes     Changes in Health Issues:   None reported     Chemical Use Review:   Substance Use: Chemical use reviewed, no active concerns identified      Tobacco Use: No current tobacco use.      Diagnosis:  1. Major depressive disorder, recurrent episode, mild with anxious distress (H)        Collateral Reports Completed:   Not Applicable    PLAN: (Patient Tasks / Therapist Tasks / Other)  Continue using PATRIZIA, FAST  and Check the Facts, along with emotional and time boundaries, engage intentionally with supportive people, prioritizing family time. Continue planning  down time/quiet time. Use grounding techniques in place of scrolling. Continue using boundaries and proactive communication with mom, friends, partner.    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    Anticipated number of session for this episode of care: 20-30  Anticipation frequency of session: Every other week  Anticipated Duration of each session: 38-52 minutes  Treatment plan will be reviewed in 90 days or when goals have been changed.     MeasurableTreatment Goal(s) related to diagnosis / functional impairment(s)  Goal 1: Client will experience an improvement in mood and anxiety evidenced by self report and AYDIN/PHQ scores of 5 or less    I will know I've met my goal when I'm enjoying my life more (paraphrase) .      Objective #A (Client Action)    Client will Increase interest, engagement, and pleasure in doing things  Decrease frequency and intensity of feeling down, depressed, hopeless  Improve quantity and quality of night time sleep / decrease daytime naps  Feel less tired and more energy during the day   Identify negative self-talk and behaviors: challenge core beliefs, myths, and actions  Improve concentration, focus, and mindfulness in  daily activities   Feel less fidgety, restless or slow in daily activities / interpersonal interactions  Use boundaries and proactive communication in relationships.  Status:    3/28/22  Intervention(s)  Therapist will teach behavioral activation, sleep hygiene, CBT, DBT and ACT skills, proactive communication, mindfulness, grounding skills to support mood improvement and anxiety reduction.    Patient has reviewed and agreed to the above plan.      DEE Walden,  [facility] Hospital[facility]  March 28, 2022

## 2022-06-13 ENCOUNTER — VIRTUAL VISIT (OUTPATIENT)
Dept: PSYCHOLOGY | Facility: CLINIC | Age: 27
End: 2022-06-13
Payer: COMMERCIAL

## 2022-06-13 DIAGNOSIS — F33.0 MAJOR DEPRESSIVE DISORDER, RECURRENT EPISODE, MILD WITH ANXIOUS DISTRESS (H): Primary | ICD-10-CM

## 2022-06-13 PROCEDURE — 90834 PSYTX W PT 45 MINUTES: CPT | Mod: GT

## 2022-06-13 NOTE — PROGRESS NOTES
[facility]                                     Progress Note    Patient Name: Joey Spears  Date: 6/13/22         Service Type: Individual      Session Start Time: 8:30 am  Session End Time: 9:15 am     Session Length: 45 min    Session #: 23    Attendees: Client attended alone    Service Modality:  Telephone visit     NOTE:  Technical issues with [employer] today precluded therapist from accessing Epic and checking patient in on time. The stated appointment duration is above.    DATA  Interactive Complexity: No  Crisis: No      Progress Since Last Session (Related to Symptoms / Goals / Homework):   Symptoms: Stable mood and anxiety Focusing on finding balance in activities to support mood.    Homework: Achieved / completed to satisfaction      Episode of Care Goals: Satisfactory progress - ACTION (Actively working towards change); Intervened by reinforcing change plan / affirming steps taken     Current / Ongoing Stressors and Concerns:  Continued exploration of relationship with alcohol-desiring more non alcohol related activities for the summer. Managing schedule balance-down time. Preparing for a visit from mother, identifying strategies for success.. Using technology to support relationship dynamic Continuing desire to focus on positive-productive relationships/activities that support wellness, learning to prioritize personal needs.     Treatment Objective(s) Addressed in This Session:   Use boundaries and proactive communication in relationships.     Intervention:  Supportive Therapy: Provided active listening and validation. Celebrated continued success with prioritizing needs. Identified desire to do more than survive weekends, to experience renewal and enjoyment. Conceptualized strategies to pursue.     Motivational Interviewing  Target Behavior: continue using  boundaries, proactive communication skills to get needs met and proactive communication with friends and  family, identify and practice necessary level of communication to address needs    Stage of Change: ACTION (Actively working towards change)    MI Intervention: Expressed Empathy/Understanding, Supported Autonomy, Collaboration, Evocation, Open-ended questions, Reflections: simple and complex and Change talk (evoked)     Change Talk Expressed by the Patient: Ability to change Reasons to change Need to change Committment to change Activation Taking steps    Provider Response to Change Talk: E - Evoked more info from patient about behavior change, A - Affirmed patient's thoughts, decisions, or attempts at behavior change, R - Reflected patient's change talk and S - Summarized patient's change talk statements      Assessments completed prior to visit:  The following assessments were completed by patient for this visit:    PHQ9:   PHQ-9 SCORE 12/13/2021 1/17/2022 1/31/2022 3/28/2022 5/9/2022 5/30/2022 6/13/2022   PHQ-9 Total Score MyChart 15 (Moderately severe depression) 8 (Mild depression) 10 (Moderate depression) 5 (Mild depression) 7 (Mild depression) 9 (Mild depression) 7 (Mild depression)   PHQ-9 Total Score 15 8 10 5 7 9 7     GAD7:   AYDIN-7 SCORE 5/27/2021 8/19/2021 9/13/2021 10/11/2021 11/8/2021 12/13/2021 5/30/2022   Total Score 7 (mild anxiety) 7 (mild anxiety) 8 (mild anxiety) 13 (moderate anxiety) 8 (mild anxiety) 14 (moderate anxiety) 9 (mild anxiety)   Total Score 7 7 8 13 8 14 9     PROMIS 10-Global Health (only subscores and total score):   PROMIS-10 Scores Only 12/13/2021 3/28/2022   Global Mental Health Score 9 9   Global Physical Health Score 15 15   PROMIS TOTAL - SUBSCORES 24 24         ASSESSMENT: Current Emotional / Mental Status (status of significant symptoms):   Risk status (Self / Other harm or suicidal ideation)   Patient denies current fears or concerns for personal safety.   Patient denies current or recent suicidal ideation or behaviors.   Patient denies current or recent homicidal  ideation or behaviors.   Patient denies current or recent self injurious behavior or ideation.   Patient denies other safety concerns.   Patient reports there has been no change in risk factors since their last session.     Patient reports there has been no change in protective factors since their last session.     Recommended that patient call 911 or go to the local ED should there be a change in any of these risk factors.     Appearance:   Appropriate    Eye Contact:   Good    Psychomotor Behavior: Normal    Attitude:   Cooperative  Friendly   Orientation:   All   Speech    Rate / Production: Normal     Volume:  Normal    Mood:    Normal, anxious   Affect:    Appropriate    Thought Content:  Clear    Thought Form:  Coherent  Logical    Insight:    Good      Medication Review:   No changes to current psychiatric medication(s)     Medication Compliance:   Yes     Changes in Health Issues:   None reported     Chemical Use Review:   Substance Use: Chemical use reviewed, no active concerns identified      Tobacco Use: No current tobacco use.      Diagnosis:  1. Major depressive disorder, recurrent episode, mild with anxious distress (H)        Collateral Reports Completed:   Not Applicable    PLAN: (Patient Tasks / Therapist Tasks / Other)  Continue using PATRIZIA, FAST and Check the Facts, along with emotional and time boundaries, engage intentionally with supportive people, prioritizing family time. Continue planning  down time/quiet time. Use grounding techniques in place of scrolling. Continue using boundaries and proactive communication with mom, friends, partner.    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    Anticipated number of session for this episode of care: 20-30  Anticipation frequency of session: Every other week  Anticipated Duration of each session: 38-52 minutes  Treatment plan will be reviewed in 90 days or when goals have been changed.     MeasurableTreatment Goal(s) related to diagnosis / functional impairment(s)  Goal 1: Client will experience an improvement in mood and anxiety evidenced by self report and AYDIN/PHQ scores of 5 or less    I will know I've met my goal when I'm enjoying my life more (paraphrase) .      Objective #A (Client Action)    Client will Increase interest, engagement, and pleasure in doing things  Decrease frequency and intensity of feeling down, depressed, hopeless  Improve quantity and quality of night time sleep / decrease daytime naps  Feel less tired and more energy during the day   Identify negative self-talk and behaviors: challenge core beliefs, myths, and actions  Improve concentration, focus, and mindfulness in daily activities   Feel less fidgety, restless or slow in daily activities / interpersonal interactions  Use boundaries and proactive communication in relationships.  Status:    3/28/22  Intervention(s)  Therapist will teach behavioral activation, sleep hygiene, CBT, DBT and ACT skills, proactive communication, mindfulness, grounding skills to support mood improvement and anxiety reduction.    Patient has reviewed and agreed to the above plan.      DEE Walden,  [facility]  March 28, 2022  Answers for HPI/ROS submitted by the patient on 6/13/2022  If you checked off any problems, how difficult have these problems made it for you to do your work,  take care of things at home, or get along with other people?: Somewhat difficult  PHQ9 TOTAL SCORE: 7

## 2022-06-17 DIAGNOSIS — F33.1 MODERATE EPISODE OF RECURRENT MAJOR DEPRESSIVE DISORDER (H): ICD-10-CM

## 2022-06-17 DIAGNOSIS — F41.9 ANXIETY: ICD-10-CM

## 2022-06-20 RX ORDER — BUPROPION HYDROCHLORIDE 300 MG/1
TABLET ORAL
Qty: 90 TABLET | Refills: 3 | Status: SHIPPED | OUTPATIENT
Start: 2022-06-20 | End: 2023-08-08

## 2022-06-20 NOTE — TELEPHONE ENCOUNTER
PHQ 5/9/2022 5/30/2022 6/13/2022   PHQ-9 Total Score 7 9 7   Q9: Thoughts of better off dead/self-harm past 2 weeks Not at all Not at all Not at all       5/16/22 rx had 90 tabs with 3 refills. Will remind pharmacy to use these refills. Epic is not letting me refuse the med with the refill rationale.  STEVE Long

## 2022-06-29 ENCOUNTER — VIRTUAL VISIT (OUTPATIENT)
Dept: PSYCHOLOGY | Facility: CLINIC | Age: 27
End: 2022-06-29
Payer: COMMERCIAL

## 2022-06-29 DIAGNOSIS — F33.0 MAJOR DEPRESSIVE DISORDER, RECURRENT EPISODE, MILD WITH ANXIOUS DISTRESS (H): Primary | ICD-10-CM

## 2022-06-29 PROCEDURE — 90834 PSYTX W PT 45 MINUTES: CPT | Mod: GT

## 2022-06-29 NOTE — PROGRESS NOTES
[facility]                                     Progress Note    Patient Name: Joey Spears  Date: 6/29/22         Service Type: Individual      Session Start Time: 8:10 am  Session End Time: 8:50 am     Session Length: 40 min    Session #: 24    Attendees: Client attended alone    Service Modality:  Telephone visit     NOTE:  Technical issues with [employer] today precluded therapist from accessing Epic and checking patient in on time. The stated appointment duration is above.    DATA  Interactive Complexity: No  Crisis: No      Progress Since Last Session (Related to Symptoms / Goals / Homework):   Symptoms: Struggling with mood and anxiety due to situational stressors Focusing on finding balance in activities to support mood.    Homework: Achieved / completed to satisfaction      Episode of Care Goals: Satisfactory progress - ACTION (Actively working towards change); Intervened by reinforcing change plan / affirming steps taken     Current / Ongoing Stressors and Concerns:  Recent visit by mom was exhausting and disappointing, including critical commentary, judgement, and negativity. Partner Managing schedule balance-down time. Using technology to support relationship dynamic Continuing desire to focus on positive-productive relationships/activities that support wellness, learning to prioritize personal needs.     Treatment Objective(s) Addressed in This Session:   Use boundaries and proactive communication in relationships.     Intervention:  Supportive Therapy: Provided active listening and validation. Processed experience of treatment by mother and need for shutting down patterns of criticism. Celebrated continued success with prioritizing enjoyable activities. Reinforced plan of doing more than survive weekends, to experience renewal and enjoyment.     Motivational Interviewing  Target Behavior: continue using  boundaries, proactive communication skills to get needs met  and proactive communication with friends and family, identify and practice necessary level of communication to address needs    Stage of Change: ACTION (Actively working towards change)    MI Intervention: Expressed Empathy/Understanding, Supported Autonomy, Collaboration, Evocation, Open-ended questions, Reflections: simple and complex and Change talk (evoked)     Change Talk Expressed by the Patient: Ability to change Reasons to change Need to change Committment to change Activation Taking steps    Provider Response to Change Talk: E - Evoked more info from patient about behavior change, A - Affirmed patient's thoughts, decisions, or attempts at behavior change, R - Reflected patient's change talk and S - Summarized patient's change talk statements      Assessments completed prior to visit: 2/15/21 DA  The following assessments were completed by patient for this visit:    PHQ9:   PHQ-9 SCORE 12/13/2021 1/17/2022 1/31/2022 3/28/2022 5/9/2022 5/30/2022 6/13/2022   PHQ-9 Total Score MyChart 15 (Moderately severe depression) 8 (Mild depression) 10 (Moderate depression) 5 (Mild depression) 7 (Mild depression) 9 (Mild depression) 7 (Mild depression)   PHQ-9 Total Score 15 8 10 5 7 9 7     GAD7:   AYDIN-7 SCORE 5/27/2021 8/19/2021 9/13/2021 10/11/2021 11/8/2021 12/13/2021 5/30/2022   Total Score 7 (mild anxiety) 7 (mild anxiety) 8 (mild anxiety) 13 (moderate anxiety) 8 (mild anxiety) 14 (moderate anxiety) 9 (mild anxiety)   Total Score 7 7 8 13 8 14 9     PROMIS 10-Global Health (only subscores and total score):   PROMIS-10 Scores Only 12/13/2021 3/28/2022 6/29/2022   Global Mental Health Score 9 9 12   Global Physical Health Score 15 15 17   PROMIS TOTAL - SUBSCORES 24 24 29         ASSESSMENT: Current Emotional / Mental Status (status of significant symptoms):   Risk status (Self / Other harm or suicidal ideation)   Patient denies current fears or concerns for personal safety.   Patient denies current or recent suicidal  ideation or behaviors.   Patient denies current or recent homicidal ideation or behaviors.   Patient denies current or recent self injurious behavior or ideation.   Patient denies other safety concerns.   Patient reports there has been no change in risk factors since their last session.     Patient reports there has been no change in protective factors since their last session.     Recommended that patient call 911 or go to the local ED should there be a change in any of these risk factors.     Appearance:   Appropriate    Eye Contact:   Good    Psychomotor Behavior: Normal    Attitude:   Cooperative  Friendly   Orientation:   All   Speech    Rate / Production: Normal     Volume:  Normal    Mood:    Normal, anxious   Affect:    Appropriate    Thought Content:  Clear    Thought Form:  Coherent  Logical    Insight:    Good      Medication Review:   No changes to current psychiatric medication(s)     Medication Compliance:   Yes     Changes in Health Issues:   None reported     Chemical Use Review:   Substance Use: Chemical use reviewed, no active concerns identified      Tobacco Use: No current tobacco use.      Diagnosis:  1. Major depressive disorder, recurrent episode, mild with anxious distress (H)        Collateral Reports Completed:   Not Applicable    PLAN: (Patient Tasks / Therapist Tasks / Other)  Continue using PATRIZIA, FAST and Check the Facts, along with emotional and time boundaries, engage intentionally with supportive people, prioritizing family time. Continue planning  down time/quiet time. Use grounding techniques in place of scrolling. Continue using boundaries and proactive communication with mom, friends, partner.    DEE Walden [facility] 6/29/22      _____________________________________________________________  Individual Treatment Plan    Patient's Name: Joey Spears  YOB: 1995    Date of Creation:   9/13/2021  Date Treatment Plan Last Reviewed/Revised:  3/28/22    DSM5 Diagnoses: 296.21 (F32.0) Major Depressive Disorder, Single Episode, Mild With anxious distress  Psychosocial / Contextual Factors: strained mother relationship; covid fatigue; public facing role with substantial emotional burden  PROMIS (reviewed every 90 days):  24 3/28/22    Referral / Collaboration:  Referral to another professional/service is not indicated at this time..    Anticipated number of session for this episode of care: 20-30  Anticipation frequency of session: Every other week  Anticipated Duration of each session: 38-52 minutes  Treatment plan will be reviewed in 90 days or when goals have been changed.     MeasurableTreatment Goal(s) related to diagnosis / functional impairment(s)  Goal 1: Client will experience an improvement in mood and anxiety evidenced by self report and AYDIN/PHQ scores of 5 or less    I will know I've met my goal when I'm enjoying my life more (paraphrase) .      Objective #A (Client Action)    Client will Increase interest, engagement, and pleasure in doing things  Decrease frequency and intensity of feeling down, depressed, hopeless  Improve quantity and quality of night time sleep / decrease daytime naps  Feel less tired and more energy during the day   Identify negative self-talk and behaviors: challenge core beliefs, myths, and actions  Improve concentration, focus, and mindfulness in daily activities   Feel less fidgety, restless or slow in daily activities / interpersonal interactions  Use boundaries and proactive communication in relationships.  Status:    3/28/22  Intervention(s)  Therapist will teach behavioral activation, sleep hygiene, CBT, DBT and ACT skills, proactive communication, mindfulness, grounding skills to support mood improvement and anxiety reduction.    Patient has reviewed and agreed to the above plan.      DEE Walden,  [facility]  March 28, 2022  Answers for HPI/ROS submitted by the patient on 6/13/2022  If you checked off any  problems, how difficult have these problems made it for you to do your work, take care of things at home, or get along with other people?: Somewhat difficult  PHQ9 TOTAL SCORE: 7

## 2022-07-19 ENCOUNTER — VIRTUAL VISIT (OUTPATIENT)
Dept: PSYCHOLOGY | Facility: CLINIC | Age: 27
End: 2022-07-19
Payer: COMMERCIAL

## 2022-07-19 DIAGNOSIS — F33.0 MAJOR DEPRESSIVE DISORDER, RECURRENT EPISODE, MILD WITH ANXIOUS DISTRESS (H): Primary | ICD-10-CM

## 2022-07-19 PROCEDURE — 90834 PSYTX W PT 45 MINUTES: CPT | Mod: GT

## 2022-07-19 NOTE — PROGRESS NOTES
[facility]                                     Progress Note    Patient Name: Joey Spears  Date: 7/19/22         Service Type: Individual      Session Start Time: 8:05 am  Session End Time: 8:50 am     Session Length: 45 min    Session #: 25    Attendees: Client attended alone    Service Modality:    Telemedicine Visit: The patient's condition can be safely assessed and treated via synchronous audio and visual telemedicine encounter.      Reason for Telemedicine Visit: Patient has requested telehealth visit    Originating Site (Patient Location): Patient's home    Distant Site (Provider Location): Provider Remote Setting- Home Office    Consent:  The patient/guardian has verbally consented to: the potential risks and benefits of telemedicine (video visit) versus in person care; bill my insurance or make self-payment for services provided; and responsibility for payment of non-covered services.     Mode of Communication:  Video Conference via [employer]    As the provider I attest to compliance with applicable laws and regulations related to telemedicine.    DATA  Interactive Complexity: No  Crisis: No      Progress Since Last Session (Related to Symptoms / Goals / Homework):   Symptoms: Struggling with mood and anxiety due to situational stressors Focusing on finding balance in activities to support mood.    Homework: Achieved / completed to satisfaction      Episode of Care Goals: Satisfactory progress - ACTION (Actively working towards change); Intervened by reinforcing change plan / affirming steps taken     Current / Ongoing Stressors and Concerns:  Partner is starting a new job that is [employer], with some travel requiring adjustment to relational dynamic. Recent visit by mom was driving limited contact and contemplation of no contact.Answers for HPI/ROS submitted by the patient on 7/19/2022  If you checked off any problems, how difficult have these problems made it for you to do  your work, take care of things at home, or get along with other people?: Somewhat difficult  PHQ9 TOTAL SCORE: 8     Continuing desire to focus on positive-productive relationships/activities that support wellness, learning to prioritize personal needs.     Treatment Objective(s) Addressed in This Session:   Use boundaries and proactive communication in relationships.     Intervention:  Supportive Therapy: Provided active listening and validation. Processed experience of treatment by mother and explored potential for no contact.  Reinforced plan of doing more than survive weekends, to experience renewal and enjoyment.     Motivational Interviewing  Target Behavior: continue using  boundaries, proactive communication skills to get needs met and proactive communication with friends and family, identify and practice necessary level of communication to address needs    Stage of Change: ACTION (Actively working towards change)    MI Intervention: Expressed Empathy/Understanding, Supported Autonomy, Collaboration, Evocation, Open-ended questions, Reflections: simple and complex and Change talk (evoked)     Change Talk Expressed by the Patient: Ability to change Reasons to change Need to change Committment to change Activation Taking steps    Provider Response to Change Talk: E - Evoked more info from patient about behavior change, A - Affirmed patient's thoughts, decisions, or attempts at behavior change, R - Reflected patient's change talk and S - Summarized patient's change talk statements      Assessments completed prior to visit: 2/15/21 DA  The following assessments were completed by patient for this visit:    PHQ9:   PHQ-9 SCORE 1/17/2022 1/31/2022 3/28/2022 5/9/2022 5/30/2022 6/13/2022 7/19/2022   PHQ-9 Total Score MyChart 8 (Mild depression) 10 (Moderate depression) 5 (Mild depression) 7 (Mild depression) 9 (Mild depression) 7 (Mild depression) 8 (Mild depression)   PHQ-9 Total Score 8 10 5 7 9 7 8     GAD7:    AYDIN-7 SCORE 5/27/2021 8/19/2021 9/13/2021 10/11/2021 11/8/2021 12/13/2021 5/30/2022   Total Score 7 (mild anxiety) 7 (mild anxiety) 8 (mild anxiety) 13 (moderate anxiety) 8 (mild anxiety) 14 (moderate anxiety) 9 (mild anxiety)   Total Score 7 7 8 13 8 14 9     PROMIS 10-Global Health (only subscores and total score):   PROMIS-10 Scores Only 12/13/2021 3/28/2022 6/29/2022   Global Mental Health Score 9 9 12   Global Physical Health Score 15 15 17   PROMIS TOTAL - SUBSCORES 24 24 29         ASSESSMENT: Current Emotional / Mental Status (status of significant symptoms):   Risk status (Self / Other harm or suicidal ideation)   Patient denies current fears or concerns for personal safety.   Patient denies current or recent suicidal ideation or behaviors.   Patient denies current or recent homicidal ideation or behaviors.   Patient denies current or recent self injurious behavior or ideation.   Patient denies other safety concerns.   Patient reports there has been no change in risk factors since their last session.     Patient reports there has been no change in protective factors since their last session.     Recommended that patient call 911 or go to the local ED should there be a change in any of these risk factors.     Appearance:   Appropriate    Eye Contact:   Good    Psychomotor Behavior: Normal    Attitude:   Cooperative  Friendly   Orientation:   All   Speech    Rate / Production: Normal     Volume:  Normal    Mood:    Normal, anxious   Affect:    Appropriate    Thought Content:  Clear    Thought Form:  Coherent  Logical    Insight:    Good      Medication Review:   No changes to current psychiatric medication(s)     Medication Compliance:   Yes     Changes in Health Issues:   None reported     Chemical Use Review:   Substance Use: Chemical use reviewed, no active concerns identified      Tobacco Use: No current tobacco use.      Diagnosis:  1. Major depressive disorder, recurrent episode, mild with anxious  distress (H)        Collateral Reports Completed:   Not Applicable    PLAN: (Patient Tasks / Therapist Tasks / Other)  Continue using PATRIZIA, FAST and Check the Facts, along with emotional and time boundaries, engage intentionally with supportive people, prioritizing family time. Continue planning  down time/quiet time. Use grounding techniques in place of scrolling. Continue using boundaries and proactive communication with mom, friends, partner.    Janae Syed, MSW [facility] 6/29/22      _____________________________________________________________  Individual Treatment Plan    Patient's Name: Joey Spears  YOB: 1995    Date of Creation:   9/13/2021  Date Treatment Plan Last Reviewed/Revised: 7/19/22    DSM5 Diagnoses: 296.21 (F32.0) Major Depressive Disorder, Single Episode, Mild With anxious distress  Psychosocial / Contextual Factors: strained mother relationship; covid fatigue; public facing role with substantial emotional burden  PROMIS (reviewed every 90 days):  29 6/29/22    Referral / Collaboration:  Referral to another professional/service is not indicated at this time..    Anticipated number of session for this episode of care: 20-30  Anticipation frequency of session: Every other week  Anticipated Duration of each session: 38-52 minutes  Treatment plan will be reviewed in 90 days or when goals have been changed.     MeasurableTreatment Goal(s) related to diagnosis / functional impairment(s)  Goal 1: Client will experience an improvement in mood and anxiety evidenced by self report and AYDIN/PHQ scores of 5 or less    I will know I've met my goal when I'm enjoying my life more (paraphrase) .      Objective #A (Client Action)    Client will Increase interest, engagement, and pleasure in doing things  Decrease frequency and intensity of feeling down, depressed, hopeless  Improve quantity and quality of night time sleep / decrease daytime naps  Feel less tired and more energy during  the day   Identify negative self-talk and behaviors: challenge core beliefs, myths, and actions  Improve concentration, focus, and mindfulness in daily activities   Feel less fidgety, restless or slow in daily activities / interpersonal interactions  Use boundaries and proactive communication in relationships.  Status:    7/19/22  Intervention(s)  Therapist will teach behavioral activation, sleep hygiene, CBT, DBT and ACT skills, proactive communication, mindfulness, grounding skills to support mood improvement and anxiety reduction.    Patient has reviewed and agreed to the above plan.      DEE Walden,  [facility]SW 7/19/22   Answers for HPI/ROS submitted by the patient on 6/13/2022  If you checked off any problems, how difficult have these problems made it for you to do your work, take care of things at home, or get along with other people?: Somewhat difficult  PHQ9 TOTAL SCORE: 7

## 2022-08-16 ENCOUNTER — VIRTUAL VISIT (OUTPATIENT)
Dept: PSYCHOLOGY | Facility: CLINIC | Age: 27
End: 2022-08-16
Payer: COMMERCIAL

## 2022-08-16 DIAGNOSIS — F33.0 MAJOR DEPRESSIVE DISORDER, RECURRENT EPISODE, MILD WITH ANXIOUS DISTRESS (H): Primary | ICD-10-CM

## 2022-08-16 PROCEDURE — 90834 PSYTX W PT 45 MINUTES: CPT | Mod: GT

## 2022-08-16 ASSESSMENT — PATIENT HEALTH QUESTIONNAIRE - PHQ9
SUM OF ALL RESPONSES TO PHQ QUESTIONS 1-9: 8
SUM OF ALL RESPONSES TO PHQ QUESTIONS 1-9: 8
10. IF YOU CHECKED OFF ANY PROBLEMS, HOW DIFFICULT HAVE THESE PROBLEMS MADE IT FOR YOU TO DO YOUR WORK, TAKE CARE OF THINGS AT HOME, OR GET ALONG WITH OTHER PEOPLE: SOMEWHAT DIFFICULT

## 2022-08-16 ASSESSMENT — ANXIETY QUESTIONNAIRES
2. NOT BEING ABLE TO STOP OR CONTROL WORRYING: MORE THAN HALF THE DAYS
8. IF YOU CHECKED OFF ANY PROBLEMS, HOW DIFFICULT HAVE THESE MADE IT FOR YOU TO DO YOUR WORK, TAKE CARE OF THINGS AT HOME, OR GET ALONG WITH OTHER PEOPLE?: SOMEWHAT DIFFICULT
GAD7 TOTAL SCORE: 12
3. WORRYING TOO MUCH ABOUT DIFFERENT THINGS: MORE THAN HALF THE DAYS
7. FEELING AFRAID AS IF SOMETHING AWFUL MIGHT HAPPEN: SEVERAL DAYS
GAD7 TOTAL SCORE: 12
6. BECOMING EASILY ANNOYED OR IRRITABLE: MORE THAN HALF THE DAYS
1. FEELING NERVOUS, ANXIOUS, OR ON EDGE: MORE THAN HALF THE DAYS
GAD7 TOTAL SCORE: 12
IF YOU CHECKED OFF ANY PROBLEMS ON THIS QUESTIONNAIRE, HOW DIFFICULT HAVE THESE PROBLEMS MADE IT FOR YOU TO DO YOUR WORK, TAKE CARE OF THINGS AT HOME, OR GET ALONG WITH OTHER PEOPLE: SOMEWHAT DIFFICULT
4. TROUBLE RELAXING: MORE THAN HALF THE DAYS
7. FEELING AFRAID AS IF SOMETHING AWFUL MIGHT HAPPEN: SEVERAL DAYS
5. BEING SO RESTLESS THAT IT IS HARD TO SIT STILL: SEVERAL DAYS

## 2022-08-16 NOTE — PROGRESS NOTES
[facility]                                     Progress Note    Patient Name: Joey Spears  Date: 8/16/22         Service Type: Individual      Session Start Time: 8:05 am  Session End Time: 8:50 am     Session Length: 45 min    Session #: 26    Attendees: Client attended alone    Service Modality:    Telemedicine Visit: The patient's condition can be safely assessed and treated via synchronous audio and visual telemedicine encounter.      Reason for Telemedicine Visit: Patient has requested telehealth visit    Originating Site (Patient Location): Patient's home    Distant Site (Provider Location): Provider Remote Setting- Home Office    Consent:  The patient/guardian has verbally consented to: the potential risks and benefits of telemedicine (video visit) versus in person care; bill my insurance or make self-payment for services provided; and responsibility for payment of non-covered services.     Mode of Communication:  Video Conference via [employer]    As the provider I attest to compliance with applicable laws and regulations related to telemedicine.    DATA  Interactive Complexity: No  Crisis: No      Progress Since Last Session (Related to Symptoms / Goals / Homework):   Symptoms: Struggling with mood and anxiety due to situational stressors Focusing on finding balance in activities to support mood.    Homework: Achieved / completed to satisfaction      Episode of Care Goals: Satisfactory progress - ACTION (Actively working towards change); Intervened by reinforcing change plan / affirming steps taken     Current / Ongoing Stressors and Concerns:  Has eliminated contact with mom, feeling anxious about it. Partner started a new job that is [employer], with some travel requiring adjustment to relational dynamic.  Working to put mother daughter relationship into perspective in order to let it go. Has muted notifications from mom. Working with financial arrangement in response to  change in partners pay.     .Answers for HPI/ROS submitted by the patient on 7/19/2022  If you checked off any problems, how difficult have these problems made it for you to do your work, take care of things at home, or get along with other people?: Somewhat difficult  PHQ9 TOTAL SCORE: 8     Continuing desire to focus on positive-productive relationships/activities that support wellness, learning to prioritize personal needs.     Treatment Objective(s) Addressed in This Session:   Use boundaries and proactive communication in relationships.     Intervention:  Supportive Therapy: Provided active listening and validation. Processed experience of eliminating communication with mother.  Reinforced plan of doing more than survive weekends, to experience renewal and enjoyment.     Motivational Interviewing  Target Behavior: continue using  boundaries, proactive communication skills to get needs met and proactive communication with friends and family, identify and practice necessary level of communication to address needs    Stage of Change: ACTION (Actively working towards change)    MI Intervention: Expressed Empathy/Understanding, Supported Autonomy, Collaboration, Evocation, Open-ended questions, Reflections: simple and complex and Change talk (evoked)     Change Talk Expressed by the Patient: Ability to change Reasons to change Need to change Committment to change Activation Taking steps    Provider Response to Change Talk: E - Evoked more info from patient about behavior change, A - Affirmed patient's thoughts, decisions, or attempts at behavior change, R - Reflected patient's change talk and S - Summarized patient's change talk statements      Assessments completed prior to visit: 2/15/21 DA  The following assessments were completed by patient for this visit:    PHQ9:   PHQ-9 SCORE 1/31/2022 3/28/2022 5/9/2022 5/30/2022 6/13/2022 7/19/2022 8/16/2022   PHQ-9 Total Score MyChart 10 (Moderate depression) 5 (Mild  depression) 7 (Mild depression) 9 (Mild depression) 7 (Mild depression) 8 (Mild depression) 8 (Mild depression)   PHQ-9 Total Score 10 5 7 9 7 8 8     GAD7:   AYDIN-7 SCORE 8/19/2021 9/13/2021 10/11/2021 11/8/2021 12/13/2021 5/30/2022 8/16/2022   Total Score 7 (mild anxiety) 8 (mild anxiety) 13 (moderate anxiety) 8 (mild anxiety) 14 (moderate anxiety) 9 (mild anxiety) 12 (moderate anxiety)   Total Score 7 8 13 8 14 9 12     PROMIS 10-Global Health (only subscores and total score):   PROMIS-10 Scores Only 12/13/2021 3/28/2022 6/29/2022   Global Mental Health Score 9 9 12   Global Physical Health Score 15 15 17   PROMIS TOTAL - SUBSCORES 24 24 29         ASSESSMENT: Current Emotional / Mental Status (status of significant symptoms):   Risk status (Self / Other harm or suicidal ideation)   Patient denies current fears or concerns for personal safety.   Patient denies current or recent suicidal ideation or behaviors.   Patient denies current or recent homicidal ideation or behaviors.   Patient denies current or recent self injurious behavior or ideation.   Patient denies other safety concerns.   Patient reports there has been no change in risk factors since their last session.     Patient reports there has been no change in protective factors since their last session.     Recommended that patient call 911 or go to the local ED should there be a change in any of these risk factors.     Appearance:   Appropriate    Eye Contact:   Good    Psychomotor Behavior: Normal    Attitude:   Cooperative  Friendly   Orientation:   All   Speech    Rate / Production: Normal     Volume:  Normal    Mood:    Normal, anxious   Affect:    Appropriate    Thought Content:  Clear    Thought Form:  Coherent  Logical    Insight:    Good      Medication Review:   No changes to current psychiatric medication(s)     Medication Compliance:   Yes     Changes in Health Issues:   None reported     Chemical Use Review:   Substance Use: Chemical use  reviewed, no active concerns identified      Tobacco Use: No current tobacco use.      Diagnosis:  1. Major depressive disorder, recurrent episode, mild with anxious distress (H)        Collateral Reports Completed:   Not Applicable    PLAN: (Patient Tasks / Therapist Tasks / Other)  Continue using PATRIZIA, FAST and Check the Facts, along with emotional and time boundaries, engage intentionally with supportive people, prioritizing family time. Continue planning  down time/quiet time. Use grounding techniques in place of scrolling. Continue using boundaries and proactive communication with friends, partner.  Hold no communication boundary with mother    Janae Syed, MSW LICSW 8/16/22      _____________________________________________________________  Individual Treatment Plan    Patient's Name: Joey Spears  YOB: 1995    Date of Creation:   9/13/2021  Date Treatment Plan Last Reviewed/Revised: 7/19/22    DSM5 Diagnoses: 296.21 (F32.0) Major Depressive Disorder, Single Episode, Mild With anxious distress  Psychosocial / Contextual Factors: strained mother relationship; covid fatigue; public facing role with substantial emotional burden  PROMIS (reviewed every 90 days):  29 6/29/22    Referral / Collaboration:  Referral to another professional/service is not indicated at this time..    Anticipated number of session for this episode of care: 20-30  Anticipation frequency of session: Every other week  Anticipated Duration of each session: 38-52 minutes  Treatment plan will be reviewed in 90 days or when goals have been changed.     MeasurableTreatment Goal(s) related to diagnosis / functional impairment(s)  Goal 1: Client will experience an improvement in mood and anxiety evidenced by self report and AYDIN/PHQ scores of 5 or less    I will know I've met my goal when I'm enjoying my life more (paraphrase) .      Objective #A (Client Action)    Client will Increase interest, engagement, and  pleasure in doing things  Decrease frequency and intensity of feeling down, depressed, hopeless  Improve quantity and quality of night time sleep / decrease daytime naps  Feel less tired and more energy during the day   Identify negative self-talk and behaviors: challenge core beliefs, myths, and actions  Improve concentration, focus, and mindfulness in daily activities   Feel less fidgety, restless or slow in daily activities / interpersonal interactions  Use boundaries and proactive communication in relationships.  Status:    7/19/22  Intervention(s)  Therapist will teach behavioral activation, sleep hygiene, CBT, DBT and ACT skills, proactive communication, mindfulness, grounding skills to support mood improvement and anxiety reduction.    Patient has reviewed and agreed to the above plan.      DEE Walden,  [facility] 7/19/22   Answers for HPI/ROS submitted by the patient on 6/13/2022  If you checked off any problems, how difficult have these problems made it for you to do your work, take care of things at home, or get along with other people?: Somewhat difficult  PHQ9 TOTAL SCORE: 7  Answers for HPI/ROS submitted by the patient on 8/16/2022  If you checked off any problems, how difficult have these problems made it for you to do your work, take care of things at home, or get along with other people?: Somewhat difficult  PHQ9 TOTAL SCORE: 8  AYDIN 7 TOTAL SCORE: 12

## 2022-09-12 ENCOUNTER — VIRTUAL VISIT (OUTPATIENT)
Dept: PSYCHOLOGY | Facility: CLINIC | Age: 27
End: 2022-09-12
Payer: COMMERCIAL

## 2022-09-12 DIAGNOSIS — F33.0 MAJOR DEPRESSIVE DISORDER, RECURRENT EPISODE, MILD WITH ANXIOUS DISTRESS (H): Primary | ICD-10-CM

## 2022-09-12 PROCEDURE — 90834 PSYTX W PT 45 MINUTES: CPT | Mod: GT

## 2022-09-12 ASSESSMENT — ANXIETY QUESTIONNAIRES
IF YOU CHECKED OFF ANY PROBLEMS ON THIS QUESTIONNAIRE, HOW DIFFICULT HAVE THESE PROBLEMS MADE IT FOR YOU TO DO YOUR WORK, TAKE CARE OF THINGS AT HOME, OR GET ALONG WITH OTHER PEOPLE: SOMEWHAT DIFFICULT
4. TROUBLE RELAXING: SEVERAL DAYS
2. NOT BEING ABLE TO STOP OR CONTROL WORRYING: SEVERAL DAYS
1. FEELING NERVOUS, ANXIOUS, OR ON EDGE: MORE THAN HALF THE DAYS
GAD7 TOTAL SCORE: 9
GAD7 TOTAL SCORE: 9
8. IF YOU CHECKED OFF ANY PROBLEMS, HOW DIFFICULT HAVE THESE MADE IT FOR YOU TO DO YOUR WORK, TAKE CARE OF THINGS AT HOME, OR GET ALONG WITH OTHER PEOPLE?: SOMEWHAT DIFFICULT
5. BEING SO RESTLESS THAT IT IS HARD TO SIT STILL: SEVERAL DAYS
GAD7 TOTAL SCORE: 9
7. FEELING AFRAID AS IF SOMETHING AWFUL MIGHT HAPPEN: SEVERAL DAYS
7. FEELING AFRAID AS IF SOMETHING AWFUL MIGHT HAPPEN: SEVERAL DAYS
6. BECOMING EASILY ANNOYED OR IRRITABLE: MORE THAN HALF THE DAYS
3. WORRYING TOO MUCH ABOUT DIFFERENT THINGS: SEVERAL DAYS

## 2022-09-12 ASSESSMENT — PATIENT HEALTH QUESTIONNAIRE - PHQ9
SUM OF ALL RESPONSES TO PHQ QUESTIONS 1-9: 4
10. IF YOU CHECKED OFF ANY PROBLEMS, HOW DIFFICULT HAVE THESE PROBLEMS MADE IT FOR YOU TO DO YOUR WORK, TAKE CARE OF THINGS AT HOME, OR GET ALONG WITH OTHER PEOPLE: SOMEWHAT DIFFICULT
SUM OF ALL RESPONSES TO PHQ QUESTIONS 1-9: 4

## 2022-09-25 ENCOUNTER — HEALTH MAINTENANCE LETTER (OUTPATIENT)
Age: 27
End: 2022-09-25

## 2022-10-03 ENCOUNTER — VIRTUAL VISIT (OUTPATIENT)
Dept: PSYCHOLOGY | Facility: CLINIC | Age: 27
End: 2022-10-03
Payer: COMMERCIAL

## 2022-10-03 DIAGNOSIS — F33.0 MAJOR DEPRESSIVE DISORDER, RECURRENT EPISODE, MILD WITH ANXIOUS DISTRESS (H): Primary | ICD-10-CM

## 2022-10-03 PROCEDURE — 90834 PSYTX W PT 45 MINUTES: CPT | Mod: GT

## 2022-10-03 ASSESSMENT — PATIENT HEALTH QUESTIONNAIRE - PHQ9
10. IF YOU CHECKED OFF ANY PROBLEMS, HOW DIFFICULT HAVE THESE PROBLEMS MADE IT FOR YOU TO DO YOUR WORK, TAKE CARE OF THINGS AT HOME, OR GET ALONG WITH OTHER PEOPLE: SOMEWHAT DIFFICULT
SUM OF ALL RESPONSES TO PHQ QUESTIONS 1-9: 7
SUM OF ALL RESPONSES TO PHQ QUESTIONS 1-9: 7

## 2022-10-03 NOTE — PROGRESS NOTES
[facility]                                     Progress Note    Patient Name: Joey Spears  Date: 10/3/22         Service Type: Individual      Session Start Time: 9:05 am  Session End Time: 9:50 am     Session Length: 45 min    Session #: 28    Attendees: Client attended alone    Service Modality:    Telemedicine Visit: The patient's condition can be safely assessed and treated via synchronous audio and visual telemedicine encounter.      Reason for Telemedicine Visit: Patient has requested telehealth visit    Originating Site (Patient Location): Patient's home    Distant Site (Provider Location): Provider Remote Setting- Home Office    Consent:  The patient/guardian has verbally consented to: the potential risks and benefits of telemedicine (video visit) versus in person care; bill my insurance or make self-payment for services provided; and responsibility for payment of non-covered services.     Mode of Communication:  Video Conference via [employer]    As the provider I attest to compliance with applicable laws and regulations related to telemedicine.    DATA  Interactive Complexity: No  Crisis: No      Progress Since Last Session (Related to Symptoms / Goals / Homework):   Symptoms: Some inconsistency with mood and anxiety due to relational stressors Focusing on finding balance in activities to support mood.    Homework: Achieved / completed to satisfaction      Episode of Care Goals: Satisfactory progress - ACTION (Actively working towards change); Intervened by reinforcing change plan / affirming steps taken     Current / Ongoing Stressors and Concerns:  Eliminated contact with mom, following excessive effort on mom's part to have communication.  Partner started a new job that is [employer], with some travel requiring adjustment to relational dynamic. Working with financial arrangement in response to change in partner's pay. Continuing desire to focus on positive-productive  relationships/activities that support wellness, learning to prioritize personal needs.     Treatment Objective(s) Addressed in This Session:   Use boundaries and proactive communication in relationships.     Intervention:  Supportive Therapy: Provided active listening and validation. Reinforced plan of doing more than survive weekends, to experience renewal and enjoyment.     Motivational Interviewing  Target Behavior: continue using  boundaries, proactive communication skills to get needs met and proactive communication with friends and family, continue no contact with mom, focusing instead on healthy and supportive relationships, start yoga, return to podcasts, mindfulness/grounding/meditation.    Stage of Change: ACTION (Actively working towards change)     MI Intervention: Expressed Empathy/Understanding, Supported Autonomy, Collaboration, Evocation, Permission to raise concern or advise, Open-ended questions, Reflections: simple and complex, Change talk (evoked) and Reframe     Change Talk Expressed by the Patient: Ability to change Reasons to change Need to change Committment to change Activation Taking steps    Provider Response to Change Talk: E - Evoked more info from patient about behavior change, A - Affirmed patient's thoughts, decisions, or attempts at behavior change, R - Reflected patient's change talk and S - Summarized patient's change talk statements      Assessments completed prior to visit: 2/15/21 DA  The following assessments were completed by patient for this visit:    PHQ9:   PHQ-9 SCORE 5/9/2022 5/30/2022 6/13/2022 7/19/2022 8/16/2022 9/12/2022 10/3/2022   PHQ-9 Total Score MyChart 7 (Mild depression) 9 (Mild depression) 7 (Mild depression) 8 (Mild depression) 8 (Mild depression) 4 (Minimal depression) 7 (Mild depression)   PHQ-9 Total Score 7 9 7 8 8 4 7     GAD7:   AYDIN-7 SCORE 9/13/2021 10/11/2021 11/8/2021 12/13/2021 5/30/2022 8/16/2022 9/12/2022   Total Score 8 (mild anxiety) 13  (moderate anxiety) 8 (mild anxiety) 14 (moderate anxiety) 9 (mild anxiety) 12 (moderate anxiety) 9 (mild anxiety)   Total Score 8 13 8 14 9 12 9     PROMIS 10-Global Health (only subscores and total score):   PROMIS-10 Scores Only 12/13/2021 3/28/2022 6/29/2022 10/3/2022   Global Mental Health Score 9 9 12 12   Global Physical Health Score 15 15 17 18   PROMIS TOTAL - SUBSCORES 24 24 29 30         ASSESSMENT: Current Emotional / Mental Status (status of significant symptoms):   Risk status (Self / Other harm or suicidal ideation)   Patient denies current fears or concerns for personal safety.   Patient denies current or recent suicidal ideation or behaviors.   Patient denies current or recent homicidal ideation or behaviors.   Patient denies current or recent self injurious behavior or ideation.   Patient denies other safety concerns.   Patient reports there has been no change in risk factors since their last session.     Patient reports there has been no change in protective factors since their last session.     Recommended that patient call 911 or go to the local ED should there be a change in any of these risk factors.     Appearance:   Appropriate    Eye Contact:   Good    Psychomotor Behavior: Normal    Attitude:   Cooperative  Friendly   Orientation:   All   Speech    Rate / Production: Normal     Volume:  Normal    Mood:    Normal, anxious   Affect:    Appropriate    Thought Content:  Clear    Thought Form:  Coherent  Logical    Insight:    Good      Medication Review:   No changes to current psychiatric medication(s)     Medication Compliance:   Yes     Changes in Health Issues:   None noted       Chemical Use Review:   Substance Use: Chemical use reviewed, no active concerns identified      Tobacco Use: No current tobacco use.      Diagnosis:  1. Major depressive disorder, recurrent episode, mild with anxious distress (H)        Collateral Reports Completed:   Not Applicable    PLAN: (Patient Tasks /  Therapist Tasks / Other)  Continue using PATRIZIA, FAST and Check the Facts engage intentionally with supportive people, prioritizing family time. Prioritize down time/quiet time, use podcasts, yoga, meditation, solo time activities outside the home. Use grounding techniques in place of scrolling. Continue using boundaries and proactive communication with friends, partner.  Continue no communication with mother    DEE Walden [facility] 10/3/22      _____________________________________________________________  Individual Treatment Plan    Patient's Name: Joey Spears  YOB: 1995    Date of Creation:   9/13/2021  Date Treatment Plan Last Reviewed/Revised: 7/19/22    DSM5 Diagnoses: 296.21 (F32.0) Major Depressive Disorder, Single Episode, Mild With anxious distress  Psychosocial / Contextual Factors: strained mother relationship; covid fatigue; public facing role with substantial emotional burden  PROMIS (reviewed every 90 days):  29 6/29/22    Referral / Collaboration:  Referral to another professional/service is not indicated at this time..    Anticipated number of session for this episode of care: 20-30  Anticipation frequency of session: Every other week  Anticipated Duration of each session: 38-52 minutes  Treatment plan will be reviewed in 90 days or when goals have been changed.     MeasurableTreatment Goal(s) related to diagnosis / functional impairment(s)  Goal 1: Client will experience an improvement in mood and anxiety evidenced by self report and AYDIN/PHQ scores of 5 or less    I will know I've met my goal when I'm enjoying my life more (paraphrase) .      Objective #A (Client Action)    Client will Increase interest, engagement, and pleasure in doing things  Decrease frequency and intensity of feeling down, depressed, hopeless  Improve quantity and quality of night time sleep / decrease daytime naps  Feel less tired and more energy during the day   Identify negative self-talk and  behaviors: challenge core beliefs, myths, and actions  Improve concentration, focus, and mindfulness in daily activities   Feel less fidgety, restless or slow in daily activities / interpersonal interactions  Use boundaries and proactive communication in relationships.  Status:    7/19/22  Intervention(s)  Therapist will teach behavioral activation, sleep hygiene, CBT, DBT and ACT skills, proactive communication, mindfulness, grounding skills to support mood improvement and anxiety reduction.    Patient has reviewed and agreed to the above plan.      DEE Walden,  [facility] Care[facility] 7/19/22   Answers for HPI/ROS submitted by the patient on 6/13/2022  If you checked off any problems, how difficult have these problems made it for you to do your work, take care of things at home, or get along with other people?: Somewhat difficult  PHQ9 TOTAL SCORE: 7  Answers for HPI/ROS submitted by the patient on 8/16/2022  If you checked off any problems, how difficult have these problems made it for you to do your work, take care of things at home, or get along with other people?: Somewhat difficult  PHQ9 TOTAL SCORE: 8  AYDIN 7 TOTAL SCORE: 12

## 2022-10-25 ENCOUNTER — VIRTUAL VISIT (OUTPATIENT)
Dept: PSYCHOLOGY | Facility: CLINIC | Age: 27
End: 2022-10-25
Payer: COMMERCIAL

## 2022-10-25 DIAGNOSIS — F33.0 MAJOR DEPRESSIVE DISORDER, RECURRENT EPISODE, MILD WITH ANXIOUS DISTRESS (H): Primary | ICD-10-CM

## 2022-10-25 PROCEDURE — 90834 PSYTX W PT 45 MINUTES: CPT | Mod: GT

## 2022-10-25 ASSESSMENT — ANXIETY QUESTIONNAIRES
GAD7 TOTAL SCORE: 7
3. WORRYING TOO MUCH ABOUT DIFFERENT THINGS: SEVERAL DAYS
4. TROUBLE RELAXING: SEVERAL DAYS
2. NOT BEING ABLE TO STOP OR CONTROL WORRYING: SEVERAL DAYS
7. FEELING AFRAID AS IF SOMETHING AWFUL MIGHT HAPPEN: NOT AT ALL
7. FEELING AFRAID AS IF SOMETHING AWFUL MIGHT HAPPEN: NOT AT ALL
6. BECOMING EASILY ANNOYED OR IRRITABLE: MORE THAN HALF THE DAYS
5. BEING SO RESTLESS THAT IT IS HARD TO SIT STILL: SEVERAL DAYS
8. IF YOU CHECKED OFF ANY PROBLEMS, HOW DIFFICULT HAVE THESE MADE IT FOR YOU TO DO YOUR WORK, TAKE CARE OF THINGS AT HOME, OR GET ALONG WITH OTHER PEOPLE?: SOMEWHAT DIFFICULT
1. FEELING NERVOUS, ANXIOUS, OR ON EDGE: SEVERAL DAYS
IF YOU CHECKED OFF ANY PROBLEMS ON THIS QUESTIONNAIRE, HOW DIFFICULT HAVE THESE PROBLEMS MADE IT FOR YOU TO DO YOUR WORK, TAKE CARE OF THINGS AT HOME, OR GET ALONG WITH OTHER PEOPLE: SOMEWHAT DIFFICULT

## 2022-10-25 ASSESSMENT — PATIENT HEALTH QUESTIONNAIRE - PHQ9
SUM OF ALL RESPONSES TO PHQ QUESTIONS 1-9: 5
SUM OF ALL RESPONSES TO PHQ QUESTIONS 1-9: 5
10. IF YOU CHECKED OFF ANY PROBLEMS, HOW DIFFICULT HAVE THESE PROBLEMS MADE IT FOR YOU TO DO YOUR WORK, TAKE CARE OF THINGS AT HOME, OR GET ALONG WITH OTHER PEOPLE: SOMEWHAT DIFFICULT

## 2022-10-25 NOTE — PROGRESS NOTES
[facility]                                     Progress Note    Patient Name: Joey Spears  Date: 10/25/22         Service Type: Individual      Session Start Time: 8:00 am  Session End Time: 8:45 am     Session Length: 45 min    Session #: 29    Attendees: Client attended alone    Service Modality:    Telemedicine Visit: The patient's condition can be safely assessed and treated via synchronous audio and visual telemedicine encounter.      Reason for Telemedicine Visit: Patient has requested telehealth visit    Originating Site (Patient Location): Patient's home    Distant Site (Provider Location): Provider Remote Setting- Home Office    Consent:  The patient/guardian has verbally consented to: the potential risks and benefits of telemedicine (video visit) versus in person care; bill my insurance or make self-payment for services provided; and responsibility for payment of non-covered services.     Mode of Communication:  Video Conference via [employer]    As the provider I attest to compliance with applicable laws and regulations related to telemedicine.    DATA  Interactive Complexity: No  Crisis: No      Progress Since Last Session (Related to Symptoms / Goals / Homework):  Symptoms: Some inconsistency with mood and anxiety due to relational stressors Focusing on finding balance in activities to support mood.    Homework: Achieved / completed to satisfaction      Episode of Care Goals: Satisfactory progress - ACTION (Actively working towards change); Intervened by reinforcing change plan / affirming steps taken     Current / Ongoing Stressors and Concerns:  No contact with mom, continued effort on mom's part to have communication.  Partner started a new job that is [employer], with some travel requiring adjustment to relational dynamic. Working with financial arrangement in response to change in partner's pay. Continuing desire to focus on positive-productive relationships/activities  that support wellness, learning to prioritize personal needs.     Treatment Objective(s) Addressed in This Session:   Use boundaries and proactive communication in relationships.     Intervention:  Supportive Therapy: Provided active listening and validation. Supported processing of mom's behaviors and treatment and lack of understanding/insight into who client is as a person.  Surfaced patterns of control by mom regarding client relationships with others and desire to isolate her from others.     Motivational Interviewing  Target Behavior: continue using  boundaries, proactive communication skills to get needs met and proactive communication with friends and family, continue no contact with mom, focusing instead on healthy and supportive relationships, start yoga, return to podcasts, mindfulness/grounding/meditation.    Stage of Change: MAINTENANCE (Working to maintain change, with risk of relapse)     MI Intervention: Expressed Empathy/Understanding, Supported Autonomy, Collaboration, Evocation, Permission to raise concern or advise, Open-ended questions, Reflections: simple and complex, Change talk (evoked) and Reframe     Change Talk Expressed by the Patient: Ability to change Reasons to change Need to change Committment to change Activation Taking steps    Provider Response to Change Talk: E - Evoked more info from patient about behavior change, A - Affirmed patient's thoughts, decisions, or attempts at behavior change, R - Reflected patient's change talk and S - Summarized patient's change talk statements      Assessments completed prior to visit: 2/15/21 DA  The following assessments were completed by patient for this visit:    PHQ9:   PHQ-9 SCORE 5/30/2022 6/13/2022 7/19/2022 8/16/2022 9/12/2022 10/3/2022 10/25/2022   PHQ-9 Total Score MyChart 9 (Mild depression) 7 (Mild depression) 8 (Mild depression) 8 (Mild depression) 4 (Minimal depression) 7 (Mild depression) 5 (Mild depression)   PHQ-9 Total Score 9 7  8 8 4 7 5     GAD7:   AYDIN-7 SCORE 10/11/2021 11/8/2021 12/13/2021 5/30/2022 8/16/2022 9/12/2022 10/25/2022   Total Score 13 (moderate anxiety) 8 (mild anxiety) 14 (moderate anxiety) 9 (mild anxiety) 12 (moderate anxiety) 9 (mild anxiety) 7 (mild anxiety)   Total Score 13 8 14 9 12 9 7     PROMIS 10-Global Health (only subscores and total score):   PROMIS-10 Scores Only 12/13/2021 3/28/2022 6/29/2022 10/3/2022 10/25/2022   Global Mental Health Score 9 9 12 12 13   Global Physical Health Score 15 15 17 18 17   PROMIS TOTAL - SUBSCORES 24 24 29 30 30         ASSESSMENT: Current Emotional / Mental Status (status of significant symptoms):   Risk status (Self / Other harm or suicidal ideation)   Patient denies current fears or concerns for personal safety.   Patient denies current or recent suicidal ideation or behaviors.   Patient denies current or recent homicidal ideation or behaviors.   Patient denies current or recent self injurious behavior or ideation.   Patient denies other safety concerns.   Patient reports there has been no change in risk factors since their last session.     Patient reports there has been no change in protective factors since their last session.     Recommended that patient call 911 or go to the local ED should there be a change in any of these risk factors.     Appearance:   Appropriate    Eye Contact:   Good    Psychomotor Behavior: Normal    Attitude:   Cooperative  Friendly   Orientation:   All   Speech    Rate / Production: Normal     Volume:  Normal    Mood:    Normal, anxious   Affect:    Appropriate  Tearful   Thought Content:  Clear    Thought Form:  Coherent  Logical    Insight:    Good      Medication Review:   No changes to current psychiatric medication(s)     Medication Compliance:   Yes     Changes in Health Issues:   None noted       Chemical Use Review:   Substance Use: Chemical use reviewed, no active concerns identified      Tobacco Use: No current tobacco use.       Diagnosis:  1. Major depressive disorder, recurrent episode, mild with anxious distress (H)        Collateral Reports Completed:   Not Applicable    PLAN: (Patient Tasks / Therapist Tasks / Other)  Continue using PATRIZIA, FAST and Check the Facts engage intentionally with supportive people, prioritizing family time. Prioritize down time/quiet time, use podcasts, yoga, meditation, solo time activities outside the home. Continue using boundaries and proactive communication with friends, partner.  Continue no communication with mother    DEE Walden [facility] 10/25/22      _____________________________________________________________  Individual Treatment Plan    Patient's Name: Joey Spears  YOB: 1995    Date of Creation:   9/13/2021  Date Treatment Plan Last Reviewed/Revised: 10/25/22    DSM5 Diagnoses: 296.21 (F32.0) Major Depressive Disorder, Single Episode, Mild With anxious distress  Psychosocial / Contextual Factors: strained mother relationship; covid fatigue; public facing role with substantial emotional burden  PROMIS (reviewed every 90 days): 30 10/25/22  Referral / Collaboration:  Referral to another professional/service is not indicated at this time.    Anticipated number of session for this episode of care: 20-30  Anticipation frequency of session: Every other week  Anticipated Duration of each session: 38-52 minutes  Treatment plan will be reviewed in 90 days or when goals have been changed.     MeasurableTreatment Goal(s) related to diagnosis / functional impairment(s)  Goal 1: Client will experience an improvement in mood and anxiety evidenced by self report and AYDIN/PHQ scores of 5 or less    I will know I've met my goal when I'm enjoying my life more (paraphrase) .      Objective #A (Client Action)    Client will Increase interest, engagement, and pleasure in doing things  Decrease frequency and intensity of feeling down, depressed, hopeless  Improve quantity and  quality of night time sleep / decrease daytime naps  Feel less tired and more energy during the day   Identify negative self-talk and behaviors: challenge core beliefs, myths, and actions  Improve concentration, focus, and mindfulness in daily activities   Feel less fidgety, restless or slow in daily activities / interpersonal interactions  Use boundaries and proactive communication in relationships.  Status:    10/25/22  Intervention(s)  Therapist will teach behavioral activation, sleep hygiene, CBT, DBT and ACT skills, proactive communication, mindfulness, grounding skills to support mood improvement and anxiety reduction.    Patient has reviewed and agreed to the above plan.      DEE Walden,  LICSW 10/25/22    Answers for HPI/ROS submitted by the patient on 8/16/2022  If you checked off any problems, how difficult have these problems made it for you to do your work, take care of things at home, or get along with other people?: Somewhat difficult  PHQ9 TOTAL SCORE: 8  AYDIN 7 TOTAL SCORE: 12

## 2022-11-14 ENCOUNTER — VIRTUAL VISIT (OUTPATIENT)
Dept: PSYCHOLOGY | Facility: CLINIC | Age: 27
End: 2022-11-14
Payer: COMMERCIAL

## 2022-11-14 DIAGNOSIS — F33.0 MAJOR DEPRESSIVE DISORDER, RECURRENT EPISODE, MILD WITH ANXIOUS DISTRESS (H): Primary | ICD-10-CM

## 2022-11-14 PROCEDURE — 90834 PSYTX W PT 45 MINUTES: CPT | Mod: GT

## 2022-11-14 ASSESSMENT — ANXIETY QUESTIONNAIRES
3. WORRYING TOO MUCH ABOUT DIFFERENT THINGS: SEVERAL DAYS
7. FEELING AFRAID AS IF SOMETHING AWFUL MIGHT HAPPEN: SEVERAL DAYS
GAD7 TOTAL SCORE: 7
GAD7 TOTAL SCORE: 7
4. TROUBLE RELAXING: SEVERAL DAYS
1. FEELING NERVOUS, ANXIOUS, OR ON EDGE: SEVERAL DAYS
IF YOU CHECKED OFF ANY PROBLEMS ON THIS QUESTIONNAIRE, HOW DIFFICULT HAVE THESE PROBLEMS MADE IT FOR YOU TO DO YOUR WORK, TAKE CARE OF THINGS AT HOME, OR GET ALONG WITH OTHER PEOPLE: SOMEWHAT DIFFICULT
2. NOT BEING ABLE TO STOP OR CONTROL WORRYING: SEVERAL DAYS
GAD7 TOTAL SCORE: 7
8. IF YOU CHECKED OFF ANY PROBLEMS, HOW DIFFICULT HAVE THESE MADE IT FOR YOU TO DO YOUR WORK, TAKE CARE OF THINGS AT HOME, OR GET ALONG WITH OTHER PEOPLE?: SOMEWHAT DIFFICULT
6. BECOMING EASILY ANNOYED OR IRRITABLE: MORE THAN HALF THE DAYS
5. BEING SO RESTLESS THAT IT IS HARD TO SIT STILL: NOT AT ALL
7. FEELING AFRAID AS IF SOMETHING AWFUL MIGHT HAPPEN: SEVERAL DAYS

## 2022-11-14 NOTE — PROGRESS NOTES
[facility]                                     Progress Note    Patient Name: Joey Spears  Date: 11/14/22         Service Type: Individual      Session Start Time: 9:00 am  Session End Time: 9:45 am     Session Length: 45 min    Session #: 31    Attendees: Client attended alone    Service Modality:    Telemedicine Visit: The patient's condition can be safely assessed and treated via synchronous audio and visual telemedicine encounter.      Reason for Telemedicine Visit: Patient has requested telehealth visit    Originating Site (Patient Location): Patient's home    Distant Site (Provider Location): Provider Remote Setting- Home Office    Consent:  The patient/guardian has verbally consented to: the potential risks and benefits of telemedicine (video visit) versus in person care; bill my insurance or make self-payment for services provided; and responsibility for payment of non-covered services.     Mode of Communication:  Video Conference via [employer]    As the provider I attest to compliance with applicable laws and regulations related to telemedicine.    DATA  Interactive Complexity: No  Crisis: No      Progress Since Last Session (Related to Symptoms / Goals / Homework):  Symptoms: Some inconsistency with mood and anxiety due to relational stressors Focusing on finding balance in activities to support mood.    Homework: Achieved / completed to satisfaction      Episode of Care Goals: Satisfactory progress - ACTION (Actively working towards change); Intervened by reinforcing change plan / affirming steps taken     Current / Ongoing Stressors and Concerns:E  No contact with mom, continued effort on mom's part to have communication.  Partner started a new job that is [employer], with some travel requiring adjustment to relational dynamic. Working with financial arrangement in response to change in partner's pay. Continuing desire to focus on positive-productive  relationships/activities that support wellness, learning to prioritize personal needs.     Treatment Objective(s) Addressed in This Session:   Use boundaries and proactive communication in relationships.     Interventions:  Supportive Therapy: Provided active listening and validation. Recognized growing comfort in being 'taken care of' by partner and family/friends.  Considering  blocking mother phone contact in response to increasingly aggressive messaging. Making request for work hours change    Motivational Interviewing  Target Behavior: continue using  boundaries, proactive communication skills to get needs met and proactive communication with friends and family, continue no contact with mom, focusing instead on healthy and supportive relationships.     Stage of Change: MAINTENANCE (Working to maintain change, with risk of relapse)     MI Intervention: Expressed Empathy/Understanding, Supported Autonomy, Collaboration, Evocation, Permission to raise concern or advise, Open-ended questions, Reflections: simple and complex, Change talk (evoked) and Reframe     Change Talk Expressed by the Patient: Ability to change Reasons to change Need to change Committment to change Activation Taking steps    Provider Response to Change Talk: E - Evoked more info from patient about behavior change, A - Affirmed patient's thoughts, decisions, or attempts at behavior change, R - Reflected patient's change talk and S - Summarized patient's change talk statements      Assessments completed prior to visit: 2/15/21 DA  The following assessments were completed by patient for this visit:    PHQ9:   PHQ-9 SCORE 6/13/2022 7/19/2022 8/16/2022 9/12/2022 10/3/2022 10/25/2022 11/14/2022   PHQ-9 Total Score MyChart 7 (Mild depression) 8 (Mild depression) 8 (Mild depression) 4 (Minimal depression) 7 (Mild depression) 5 (Mild depression) 7 (Mild depression)   PHQ-9 Total Score 7 8 8 4 7 5 7     GAD7:   AYDIN-7 SCORE 11/8/2021 12/13/2021 5/30/2022  8/16/2022 9/12/2022 10/25/2022 11/14/2022   Total Score 8 (mild anxiety) 14 (moderate anxiety) 9 (mild anxiety) 12 (moderate anxiety) 9 (mild anxiety) 7 (mild anxiety) 7 (mild anxiety)   Total Score 8 14 9 12 9 7 7     PROMIS 10-Global Health (only subscores and total score):   PROMIS-10 Scores Only 12/13/2021 3/28/2022 6/29/2022 10/3/2022 10/25/2022   Global Mental Health Score 9 9 12 12 13   Global Physical Health Score 15 15 17 18 17   PROMIS TOTAL - SUBSCORES 24 24 29 30 30         ASSESSMENT: Current Emotional / Mental Status (status of significant symptoms):   Risk status (Self / Other harm or suicidal ideation)   Patient denies current fears or concerns for personal safety.   Patient denies current or recent suicidal ideation or behaviors.   Patient denies current or recent homicidal ideation or behaviors.   Patient denies current or recent self injurious behavior or ideation.   Patient denies other safety concerns.   Patient reports there has been no change in risk factors since their last session.     Patient reports there has been no change in protective factors since their last session.     Recommended that patient call 911 or go to the local ED should there be a change in any of these risk factors.     Appearance:   Appropriate    Eye Contact:   Good    Psychomotor Behavior: Normal    Attitude:   Cooperative  Friendly   Orientation:   All   Speech    Rate / Production: Normal     Volume:  Normal    Mood:    Normal, anxious   Affect:    Appropriate  Tearful   Thought Content:  Clear    Thought Form:  Coherent  Logical    Insight:    Good      Medication Review:   No changes to current psychiatric medication(s)     Medication Compliance:   Yes     Changes in Health Issues:   None noted       Chemical Use Review:   Substance Use: Chemical use reviewed, no active concerns identified      Tobacco Use: No current tobacco use.      Diagnosis:  1. Major depressive disorder, recurrent episode, mild with anxious  distress (H)        Collateral Reports Completed:   Not Applicable    PLAN: (Patient Tasks / Therapist Tasks / Other)  Continue using PATRIZIA, FAST and Check the Facts engage intentionally with supportive people, prioritizing family time. Prioritize down time/quiet time, use podcasts, yoga, meditation, solo time activities outside the home. Continue using boundaries and proactive communication with friends, partner.  Continue no communication with mother    Janae SyedDEE [facility] 10/25/22      _____________________________________________________________  Individual Treatment Plan    Patient's Name: Joey Spears  YOB: 1995    Date of Creation:   9/13/2021  Date Treatment Plan Last Reviewed/Revised: 10/25/22    DSM5 Diagnoses: 296.21 (F32.0) Major Depressive Disorder, Single Episode, Mild With anxious distress  Psychosocial / Contextual Factors: strained mother relationship; covid fatigue; public facing role with substantial emotional burden  PROMIS (reviewed every 90 days): 30 10/25/22  Referral / Collaboration:  Referral to another professional/service is not indicated at this time.    Anticipated number of session for this episode of care: 20-30  Anticipation frequency of session: Every other week  Anticipated Duration of each session: 38-52 minutes  Treatment plan will be reviewed in 90 days or when goals have been changed.     MeasurableTreatment Goal(s) related to diagnosis / functional impairment(s)  Goal 1: Client will experience an improvement in mood and anxiety evidenced by self report and AYDIN/PHQ scores of 5 or less    I will know I've met my goal when I'm enjoying my life more (paraphrase) .      Objective #A (Client Action)    Client will Increase interest, engagement, and pleasure in doing things  Decrease frequency and intensity of feeling down, depressed, hopeless  Improve quantity and quality of night time sleep / decrease daytime naps  Feel less tired and more energy  during the day   Identify negative self-talk and behaviors: challenge core beliefs, myths, and actions  Improve concentration, focus, and mindfulness in daily activities   Feel less fidgety, restless or slow in daily activities / interpersonal interactions  Use boundaries and proactive communication in relationships.  Status:    10/25/22  Intervention(s)  Therapist will teach behavioral activation, sleep hygiene, CBT, DBT and ACT skills, proactive communication, mindfulness, grounding skills to support mood improvement and anxiety reduction.    Patient has reviewed and agreed to the above plan.      DEE Walden,  [facility] 10/25/22    Answers for HPI/ROS submitted by the patient on 8/16/2022  If you checked off any problems, how difficult have these problems made it for you to do your work, take care of things at home, or get along with other people?: Somewhat difficult  PHQ9 TOTAL SCORE: 8  AYDIN 7 TOTAL SCORE: 12

## 2022-11-18 DIAGNOSIS — F41.9 ANXIETY: ICD-10-CM

## 2022-11-18 DIAGNOSIS — F33.1 MODERATE EPISODE OF RECURRENT MAJOR DEPRESSIVE DISORDER (H): ICD-10-CM

## 2022-11-18 RX ORDER — BUSPIRONE HYDROCHLORIDE 10 MG/1
TABLET ORAL
Qty: 180 TABLET | Refills: 3 | OUTPATIENT
Start: 2022-11-18

## 2022-11-28 ENCOUNTER — VIRTUAL VISIT (OUTPATIENT)
Dept: PSYCHOLOGY | Facility: CLINIC | Age: 27
End: 2022-11-28
Payer: COMMERCIAL

## 2022-11-28 DIAGNOSIS — F33.0 MAJOR DEPRESSIVE DISORDER, RECURRENT EPISODE, MILD WITH ANXIOUS DISTRESS (H): Primary | ICD-10-CM

## 2022-11-28 PROCEDURE — 90834 PSYTX W PT 45 MINUTES: CPT | Mod: GT

## 2022-11-28 ASSESSMENT — ANXIETY QUESTIONNAIRES
GAD7 TOTAL SCORE: 11
5. BEING SO RESTLESS THAT IT IS HARD TO SIT STILL: NOT AT ALL
4. TROUBLE RELAXING: MORE THAN HALF THE DAYS
7. FEELING AFRAID AS IF SOMETHING AWFUL MIGHT HAPPEN: SEVERAL DAYS
7. FEELING AFRAID AS IF SOMETHING AWFUL MIGHT HAPPEN: SEVERAL DAYS
8. IF YOU CHECKED OFF ANY PROBLEMS, HOW DIFFICULT HAVE THESE MADE IT FOR YOU TO DO YOUR WORK, TAKE CARE OF THINGS AT HOME, OR GET ALONG WITH OTHER PEOPLE?: SOMEWHAT DIFFICULT
IF YOU CHECKED OFF ANY PROBLEMS ON THIS QUESTIONNAIRE, HOW DIFFICULT HAVE THESE PROBLEMS MADE IT FOR YOU TO DO YOUR WORK, TAKE CARE OF THINGS AT HOME, OR GET ALONG WITH OTHER PEOPLE: SOMEWHAT DIFFICULT
GAD7 TOTAL SCORE: 11
6. BECOMING EASILY ANNOYED OR IRRITABLE: MORE THAN HALF THE DAYS
GAD7 TOTAL SCORE: 11
3. WORRYING TOO MUCH ABOUT DIFFERENT THINGS: MORE THAN HALF THE DAYS
2. NOT BEING ABLE TO STOP OR CONTROL WORRYING: MORE THAN HALF THE DAYS
1. FEELING NERVOUS, ANXIOUS, OR ON EDGE: MORE THAN HALF THE DAYS

## 2022-11-28 ASSESSMENT — PATIENT HEALTH QUESTIONNAIRE - PHQ9
10. IF YOU CHECKED OFF ANY PROBLEMS, HOW DIFFICULT HAVE THESE PROBLEMS MADE IT FOR YOU TO DO YOUR WORK, TAKE CARE OF THINGS AT HOME, OR GET ALONG WITH OTHER PEOPLE: VERY DIFFICULT
SUM OF ALL RESPONSES TO PHQ QUESTIONS 1-9: 8
SUM OF ALL RESPONSES TO PHQ QUESTIONS 1-9: 8

## 2022-11-28 NOTE — PROGRESS NOTES
[facility]                                     Progress Note    Patient Name: Joey Spears  Date: 11/28/22         Service Type: Individual      Session Start Time: 9:00 am  Session End Time: 9:45 am     Session Length: 45 min    Session #: 32    Attendees: Client attended alone    Service Modality:    Telemedicine Visit: The patient's condition can be safely assessed and treated via synchronous audio and visual telemedicine encounter.      Reason for Telemedicine Visit: Patient has requested telehealth visit    Originating Site (Patient Location): Patient's home    Distant Site (Provider Location): Provider Remote Setting- Home Office    Consent:  The patient/guardian has verbally consented to: the potential risks and benefits of telemedicine (video visit) versus in person care; bill my insurance or make self-payment for services provided; and responsibility for payment of non-covered services.     Mode of Communication:  Video Conference via [employer]    As the provider I attest to compliance with applicable laws and regulations related to telemedicine.    DATA  Interactive Complexity: No  Crisis: No      Progress Since Last Session (Related to Symptoms / Goals / Homework):  Symptoms: Some inconsistency with mood and anxiety due to relational stressors (mother) Focusing on finding balance in activities to support mood.    Homework: Achieved / completed to satisfaction      Episode of Care Goals: Satisfactory progress - ACTION (Actively working towards change); Intervened by reinforcing change plan / affirming steps taken     Current / Ongoing Stressors and Concerns:E  No contact with mother, increasing contact with supports, including stepdad.  Busy season at work; back problems. Continuing desire to focus on positive-productive relationships/activities that support wellness, learning to prioritize personal needs.     Treatment Objective(s) Addressed in This Session:   Use  boundaries and proactive communication in relationships.     Interventions:  Supportive Therapy: Provided active listening and validation. Taught and practiced Metta loving kindness meditation    Motivational Interviewing  Target Behavior: continue using  boundaries, proactive communication skills to get needs met and proactive communication with friends and family, continue no contact with mom, focusing instead on healthy and supportive relationships.     Stage of Change: MAINTENANCE (Working to maintain change, with risk of relapse)     MI Intervention: Expressed Empathy/Understanding, Supported Autonomy, Collaboration, Evocation, Permission to raise concern or advise, Open-ended questions, Reflections: simple and complex, Change talk (evoked) and Reframe     Change Talk Expressed by the Patient: Ability to change Reasons to change Need to change Committment to change Activation Taking steps    Provider Response to Change Talk: E - Evoked more info from patient about behavior change, A - Affirmed patient's thoughts, decisions, or attempts at behavior change, R - Reflected patient's change talk and S - Summarized patient's change talk statements      Assessments completed prior to visit: 2/15/21 DA  The following assessments were completed by patient for this visit:    PHQ9:   PHQ-9 SCORE 7/19/2022 8/16/2022 9/12/2022 10/3/2022 10/25/2022 11/14/2022 11/28/2022   PHQ-9 Total Score MyChart 8 (Mild depression) 8 (Mild depression) 4 (Minimal depression) 7 (Mild depression) 5 (Mild depression) 7 (Mild depression) 8 (Mild depression)   PHQ-9 Total Score 8 8 4 7 5 7 8     GAD7:   AYDIN-7 SCORE 12/13/2021 5/30/2022 8/16/2022 9/12/2022 10/25/2022 11/14/2022 11/28/2022   Total Score 14 (moderate anxiety) 9 (mild anxiety) 12 (moderate anxiety) 9 (mild anxiety) 7 (mild anxiety) 7 (mild anxiety) 11 (moderate anxiety)   Total Score 14 9 12 9 7 7 11     PROMIS 10-Global Health (only subscores and total score):   PROMIS-10 Scores  Only 12/13/2021 3/28/2022 6/29/2022 10/3/2022 10/25/2022   Global Mental Health Score 9 9 12 12 13   Global Physical Health Score 15 15 17 18 17   PROMIS TOTAL - SUBSCORES 24 24 29 30 30         ASSESSMENT: Current Emotional / Mental Status (status of significant symptoms):   Risk status (Self / Other harm or suicidal ideation)   Patient denies current fears or concerns for personal safety.   Patient denies current or recent suicidal ideation or behaviors.   Patient denies current or recent homicidal ideation or behaviors.   Patient denies current or recent self injurious behavior or ideation.   Patient denies other safety concerns.   Patient reports there has been no change in risk factors since their last session.     Patient reports there has been no change in protective factors since their last session.     Recommended that patient call 911 or go to the local ED should there be a change in any of these risk factors.     Appearance:   Appropriate    Eye Contact:   Good    Psychomotor Behavior: Normal    Attitude:   Cooperative  Friendly   Orientation:   All   Speech    Rate / Production: Normal     Volume:  Normal    Mood:    Normal, anxious   Affect:    Appropriate    Thought Content:  Clear    Thought Form:  Coherent  Logical    Insight:    Good      Medication Review:   No changes to current psychiatric medication(s)     Medication Compliance:   Yes     Changes in Health Issues:   None noted       Chemical Use Review:   Substance Use: Chemical use reviewed, no active concerns identified      Tobacco Use: No current tobacco use.      Diagnosis:  1. Major depressive disorder, recurrent episode, mild with anxious distress (H)        Collateral Reports Completed:   Not Applicable    PLAN: (Patient Tasks / Therapist Tasks / Other)  Continue using PATRIZIA, FAST and Check the Facts engage intentionally with supportive people, prioritizing family time. Prioritize down time/quiet time, use podcasts, yoga, meditation,  solo time activities outside the home. Continue using boundaries and proactive communication with friends, partner.  Continue no communication with . 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    Anticipated number of session for this episode of care: 20-30  Anticipation frequency of session: Every other week  Anticipated Duration of each session: 38-52 minutes  Treatment plan will be reviewed in 90 days or when goals have been changed.     MeasurableTreatment Goal(s) related to diagnosis / functional impairment(s)  Goal 1: Client will experience an improvement in mood and anxiety evidenced by self report and AYDIN/PHQ scores of 5 or less    I will know I've met my goal when I'm enjoying my life more (paraphrase) .      Objective #A (Client Action)    Client will Increase interest, engagement, and pleasure in doing things  Decrease frequency and intensity of feeling down, depressed, hopeless  Improve quantity and quality of night time sleep / decrease daytime naps  Feel less tired and more energy during the day   Identify negative self-talk and behaviors: challenge core beliefs, myths, and actions  Improve concentration, focus, and mindfulness in daily activities   Feel less fidgety, restless or slow in daily activities / interpersonal interactions  Use boundaries and proactive  communication in relationships.  Status:    10/25/22  Intervention(s)  Therapist will teach behavioral activation, sleep hygiene, CBT, DBT and ACT skills, proactive communication, mindfulness, grounding skills to support mood improvement and anxiety reduction.    Patient has reviewed and agreed to the above plan.      DEE Walden,  [facility] 10/25/22    Answers for HPI/ROS submitted by the patient on 8/16/2022  If you checked off any problems, how difficult have these problems made it for you to do your work, take care of things at home, or get along with other people?: Somewhat difficult  PHQ9 TOTAL SCORE: 8  AYDIN 7 TOTAL SCORE: 12

## 2022-12-12 ENCOUNTER — VIRTUAL VISIT (OUTPATIENT)
Dept: PSYCHOLOGY | Facility: CLINIC | Age: 27
End: 2022-12-12
Payer: COMMERCIAL

## 2022-12-12 DIAGNOSIS — F33.0 MAJOR DEPRESSIVE DISORDER, RECURRENT EPISODE, MILD WITH ANXIOUS DISTRESS (H): Primary | ICD-10-CM

## 2022-12-12 PROCEDURE — 90834 PSYTX W PT 45 MINUTES: CPT | Mod: GT

## 2022-12-12 NOTE — PROGRESS NOTES
[facility]                                     Progress Note    Patient Name: Joey Spears  Date: 12/12/22         Service Type: Individual      Session Start Time: 12:00 pm  Session End Time: 12:45 pm     Session Length: 45 min    Session #: 33    Attendees: Client attended alone    Service Modality:    Telemedicine Visit: The patient's condition can be safely assessed and treated via synchronous audio and visual telemedicine encounter.      Reason for Telemedicine Visit: Patient has requested telehealth visit    Originating Site (Patient Location): Patient's home    Distant Site (Provider Location): Provider Remote Setting- Home Office    Consent:  The patient/guardian has verbally consented to: the potential risks and benefits of telemedicine (video visit) versus in person care; bill my insurance or make self-payment for services provided; and responsibility for payment of non-covered services.     Mode of Communication:  Video Conference via [employer]    As the provider I attest to compliance with applicable laws and regulations related to telemedicine.    DATA  Interactive Complexity: No  Crisis: No      Progress Since Last Session (Related to Symptoms / Goals / Homework):  Symptoms: Some inconsistency with mood and anxiety due to relational stressors (mother) Focusing on finding balance in activities to support mood.    Homework: Achieved / completed to satisfaction      Episode of Care Goals: Satisfactory progress - ACTION (Actively working towards change); Intervened by reinforcing change plan / affirming steps taken     Current / Ongoing Stressors and Concerns:E  Holiday season preparation and events.  No contact with mother, increasing contact with supports, including stepdad.  Busy season at work; back problems. Continuing desire to focus on positive-productive relationships/activities that support wellness, learning to prioritize personal needs.     Treatment  Objective(s) Addressed in This Session:   Use boundaries and proactive communication in relationships.   Improve quantity and quality of night time sleep / decrease daytime naps    Interventions:  Supportive Therapy: Provided active listening and validation. Taught and practiced Metta loving kindness meditation    Motivational Interviewing  Target Behavior: continue using  boundaries, proactive communication skills to get needs met and proactive communication with friends and family, continue no contact with mom, focusing instead on healthy and supportive relationships.     Stage of Change: MAINTENANCE (Working to maintain change, with risk of relapse)     MI Intervention: Expressed Empathy/Understanding, Supported Autonomy, Collaboration, Evocation, Permission to raise concern or advise, Open-ended questions, Reflections: simple and complex, Change talk (evoked) and Reframe     Change Talk Expressed by the Patient: Ability to change Reasons to change Need to change Committment to change Activation Taking steps    Provider Response to Change Talk: E - Evoked more info from patient about behavior change, A - Affirmed patient's thoughts, decisions, or attempts at behavior change, R - Reflected patient's change talk and S - Summarized patient's change talk statements      Assessments completed prior to visit: 2/15/21 DA  The following assessments were completed by patient for this visit:    PHQ9:   PHQ-9 SCORE 7/19/2022 8/16/2022 9/12/2022 10/3/2022 10/25/2022 11/14/2022 11/28/2022   PHQ-9 Total Score MyChart 8 (Mild depression) 8 (Mild depression) 4 (Minimal depression) 7 (Mild depression) 5 (Mild depression) 7 (Mild depression) 8 (Mild depression)   PHQ-9 Total Score 8 8 4 7 5 7 8     GAD7:   AYDIN-7 SCORE 12/13/2021 5/30/2022 8/16/2022 9/12/2022 10/25/2022 11/14/2022 11/28/2022   Total Score 14 (moderate anxiety) 9 (mild anxiety) 12 (moderate anxiety) 9 (mild anxiety) 7 (mild anxiety) 7 (mild anxiety) 11 (moderate  anxiety)   Total Score 14 9 12 9 7 7 11     PROMIS 10-Global Health (only subscores and total score):   PROMIS-10 Scores Only 12/13/2021 3/28/2022 6/29/2022 10/3/2022 10/25/2022   Global Mental Health Score 9 9 12 12 13   Global Physical Health Score 15 15 17 18 17   PROMIS TOTAL - SUBSCORES 24 24 29 30 30         ASSESSMENT: Current Emotional / Mental Status (status of significant symptoms):   Risk status (Self / Other harm or suicidal ideation)   Patient denies current fears or concerns for personal safety.   Patient denies current or recent suicidal ideation or behaviors.   Patient denies current or recent homicidal ideation or behaviors.   Patient denies current or recent self injurious behavior or ideation.   Patient denies other safety concerns.   Patient reports there has been no change in risk factors since their last session.     Patient reports there has been no change in protective factors since their last session.     Recommended that patient call 911 or go to the local ED should there be a change in any of these risk factors.     Appearance:   Appropriate    Eye Contact:   Good    Psychomotor Behavior: Normal    Attitude:   Cooperative  Friendly   Orientation:   All   Speech    Rate / Production: Normal     Volume:  Normal    Mood:    Normal, anxious   Affect:    Appropriate    Thought Content:  Clear    Thought Form:  Coherent  Logical    Insight:    Good      Medication Review:   No changes to current psychiatric medication(s)     Medication Compliance:   Yes     Changes in Health Issues:   None noted       Chemical Use Review:   Substance Use: Chemical use reviewed, no active concerns identified      Tobacco Use: No current tobacco use.      Diagnosis:  1. Major depressive disorder, recurrent episode, mild with anxious distress (H)        Collateral Reports Completed:   Not Applicable    PLAN: (Patient Tasks / Therapist Tasks / Other)  Continue using PATRIZIA, FAST and Check the Facts engage  intentionally with supportive people, prioritizing family time. Prioritize down time/quiet time, use podcasts, yoga, meditation, solo time activities.  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    Anticipated number of session for this episode of care: 20-30  Anticipation frequency of session: Every other week  Anticipated Duration of each session: 38-52 minutes  Treatment plan will be reviewed in 90 days or when goals have been changed.     MeasurableTreatment Goal(s) related to diagnosis / functional impairment(s)  Goal 1: Client will experience an improvement in mood and anxiety evidenced by self report and AYDIN/PHQ scores of 5 or less    I will know I've met my goal when I'm enjoying my life more (paraphrase) .      Objective #A (Client Action)    Client will Increase interest, engagement, and pleasure in doing things  Decrease frequency and intensity of feeling down, depressed, hopeless  Improve quantity and quality of night time sleep / decrease daytime naps  Feel less tired and more energy during the day   Identify negative self-talk and behaviors: challenge core beliefs, myths, and actions  Improve concentration, focus, and mindfulness in daily activities   Feel less fidgety, restless or slow in daily activities /  interpersonal interactions  Use boundaries and proactive communication in relationships.  Status:    10/25/22  Intervention(s)  Therapist will teach behavioral activation, sleep hygiene, CBT, DBT and ACT skills, proactive communication, mindfulness, grounding skills to support mood improvement and anxiety reduction.    Patient has reviewed and agreed to the above plan.      DEE Walden,  [facility] 10/25/22    Answers for HPI/ROS submitted by the patient on 8/16/2022  If you checked off any problems, how difficult have these problems made it for you to do your work, take care of things at home, or get along with other people?: Somewhat difficult  PHQ9 TOTAL SCORE: 8  AYDIN 7 TOTAL SCORE: 12

## 2023-01-02 ENCOUNTER — VIRTUAL VISIT (OUTPATIENT)
Dept: PSYCHOLOGY | Facility: CLINIC | Age: 28
End: 2023-01-02
Payer: COMMERCIAL

## 2023-01-02 DIAGNOSIS — F33.0 MAJOR DEPRESSIVE DISORDER, RECURRENT EPISODE, MILD WITH ANXIOUS DISTRESS (H): Primary | ICD-10-CM

## 2023-01-02 PROCEDURE — 90834 PSYTX W PT 45 MINUTES: CPT | Mod: GT

## 2023-01-02 NOTE — PROGRESS NOTES
[facility]                                     Progress Note    Patient Name: Joey Spears  Date: 1/2/23         Service Type: Individual      Session Start Time: 3:00 pm  Session End Time: 3:45 pm     Session Length: 45 min    Session #: 34    Attendees: Client attended alone    Service Modality:    Telemedicine Visit: The patient's condition can be safely assessed and treated via synchronous audio and visual telemedicine encounter.      Reason for Telemedicine Visit: Patient has requested telehealth visit    Originating Site (Patient Location): Patient's home    Distant Site (Provider Location): Provider Remote Setting- Home Office    Consent:  The patient/guardian has verbally consented to: the potential risks and benefits of telemedicine (video visit) versus in person care; bill my insurance or make self-payment for services provided; and responsibility for payment of non-covered services.     Mode of Communication:  Video Conference via [employer]    As the provider I attest to compliance with applicable laws and regulations related to telemedicine.    DATA  Interactive Complexity: No  Crisis: No      Progress Since Last Session (Related to Symptoms / Goals / Homework):  Symptoms: Some inconsistency with mood and anxiety due to relational stressors (mother) Focusing on finding balance in activities to support mood.    Homework: Achieved / completed to satisfaction      Episode of Care Goals: Satisfactory progress - ACTION (Actively working towards change); Intervened by reinforcing change plan / affirming steps taken     Current / Ongoing Stressors and Concerns:E  Minimal contact with mother, increasing pressure to communicate by stepdad.  Interested in identifying specific tangible goals. Continuing desire to focus on positive-productive relationships/activities that support wellness, learning to prioritize personal needs.  Treatment Objective(s) Addressed in This  Session:   Use boundaries and proactive communication in relationships.   Improve quantity and quality of night time sleep / decrease daytime naps    Interventions:  Supportive Therapy: Provided active listening and validation. Taught and practiced Metta loving kindness meditation    Motivational Interviewing  Target Behavior: continue using  boundaries, proactive communication skills to get needs met and proactive communication with friends and family, continue no contact with mom, focusing instead on healthy and supportive relationships. Reengage with goal focused effort toward health, nutrition, alcohol consumption and overall wellness-pursue partner collab     Stage of Change: ACTION (Actively working towards change)     MI Intervention: Expressed Empathy/Understanding, Supported Autonomy, Collaboration, Evocation, Permission to raise concern or advise, Open-ended questions, Reflections: simple and complex, Change talk (evoked) and Reframe     Change Talk Expressed by the Patient: Ability to change Reasons to change Need to change Committment to change Activation Taking steps    Provider Response to Change Talk: E - Evoked more info from patient about behavior change, A - Affirmed patient's thoughts, decisions, or attempts at behavior change, R - Reflected patient's change talk and S - Summarized patient's change talk statements     Assessments completed prior to visit: 2/15/21 DA  The following assessments were completed by patient for this visit:    PHQ9:   PHQ-9 SCORE 8/16/2022 9/12/2022 10/3/2022 10/25/2022 11/14/2022 11/28/2022 1/2/2023   PHQ-9 Total Score MyChart 8 (Mild depression) 4 (Minimal depression) 7 (Mild depression) 5 (Mild depression) 7 (Mild depression) 8 (Mild depression) 8 (Mild depression)   PHQ-9 Total Score 8 4 7 5 7 8 8     GAD7:   AYDIN-7 SCORE 12/13/2021 5/30/2022 8/16/2022 9/12/2022 10/25/2022 11/14/2022 11/28/2022   Total Score 14 (moderate anxiety) 9 (mild anxiety) 12 (moderate anxiety)  9 (mild anxiety) 7 (mild anxiety) 7 (mild anxiety) 11 (moderate anxiety)   Total Score 14 9 12 9 7 7 11     PROMIS 10-Global Health (only subscores and total score):   PROMIS-10 Scores Only 12/13/2021 3/28/2022 6/29/2022 10/3/2022 10/25/2022   Global Mental Health Score 9 9 12 12 13   Global Physical Health Score 15 15 17 18 17   PROMIS TOTAL - SUBSCORES 24 24 29 30 30         ASSESSMENT: Current Emotional / Mental Status (status of significant symptoms):   Risk status (Self / Other harm or suicidal ideation)   Patient denies current fears or concerns for personal safety.   Patient denies current or recent suicidal ideation or behaviors.   Patient denies current or recent homicidal ideation or behaviors.   Patient denies current or recent self injurious behavior or ideation.   Patient denies other safety concerns.   Patient reports there has been no change in risk factors since their last session.     Patient reports there has been no change in protective factors since their last session.     Recommended that patient call 911 or go to the local ED should there be a change in any of these risk factors.     Appearance:   Appropriate    Eye Contact:   Good    Psychomotor Behavior: Normal    Attitude:   Cooperative  Friendly   Orientation:   All   Speech    Rate / Production: Normal     Volume:  Normal    Mood:    Normal, anxious   Affect:    Appropriate    Thought Content:  Clear    Thought Form:  Coherent  Logical    Insight:    Good      Medication Review:   No changes to current psychiatric medication(s)     Medication Compliance:   Yes     Changes in Health Issues:   None noted       Chemical Use Review:   Substance Use: Chemical use reviewed, no active concerns identified      Tobacco Use: No current tobacco use.      Diagnosis:  1. Major depressive disorder, recurrent episode, mild with anxious distress (H)        Collateral Reports Completed:   Not Applicable    PLAN: (Patient Tasks / Therapist Tasks /  Other)  Continue using PATRIZIA, FAST and Check the Facts engage intentionally with supportive people, prioritizing family time. Prioritize down time/quiet time, use podcasts, yoga, meditation, solo time activities.  Continue no communication with mother, boundary with stepdad,  Metta meditation. 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    Anticipated number of session for this episode of care: 20-30  Anticipation frequency of session: Every other week  Anticipated Duration of each session: 38-52 minutes  Treatment plan will be reviewed in 90 days or when goals have been changed.     MeasurableTreatment Goal(s) related to diagnosis / functional impairment(s)  Goal 1: Client will experience an improvement in mood and anxiety evidenced by self report and AYDIN/PHQ scores of 5 or less    I will know I've met my goal when I'm enjoying my life more (paraphrase) .      Objective #A (Client Action)    Client will Increase interest, engagement, and pleasure in doing things  Decrease frequency and intensity of feeling down, depressed, hopeless  Improve quantity and quality of night time sleep / decrease daytime naps  Feel less tired and more energy during the day   Identify negative self-talk and behaviors: challenge core beliefs, myths, and actions  Improve concentration,  focus, and mindfulness in daily activities   Feel less fidgety, restless or slow in daily activities / interpersonal interactions  Use boundaries and proactive communication in relationships.  Status:    10/25/22  Intervention(s)  Therapist will teach behavioral activation, sleep hygiene, CBT, DBT and ACT skills, proactive communication, mindfulness, grounding skills to support mood improvement and anxiety reduction.    Patient has reviewed and agreed to the above plan.      DEE Walden,  [facility] 10/25/22    Answers for HPI/ROS submitted by the patient on 8/16/2022  If you checked off any problems, how difficult have these problems made it for you to do your work, take care of things at home, or get along with other people?: Somewhat difficult  PHQ9 TOTAL SCORE: 8  AYDIN 7 TOTAL SCORE: 12

## 2023-02-06 ENCOUNTER — VIRTUAL VISIT (OUTPATIENT)
Dept: PSYCHOLOGY | Facility: CLINIC | Age: 28
End: 2023-02-06
Payer: COMMERCIAL

## 2023-02-06 DIAGNOSIS — F33.0 MAJOR DEPRESSIVE DISORDER, RECURRENT EPISODE, MILD WITH ANXIOUS DISTRESS (H): Primary | ICD-10-CM

## 2023-02-06 PROCEDURE — 90834 PSYTX W PT 45 MINUTES: CPT | Mod: GT

## 2023-02-06 NOTE — PROGRESS NOTES
[facility]                                     Progress Note    Patient Name: Joey Spears  Date:   2/6/2023        Service Type: Individual      Session Start Time: 10:00 am  Session End Time: 10:45 am     Session Length: 45 min    Session #: 35    Attendees: Client attended alone    Service Modality:    Telemedicine Visit: The patient's condition can be safely assessed and treated via synchronous audio and visual telemedicine encounter.      Reason for Telemedicine Visit: Patient has requested telehealth visit    Originating Site (Patient Location): Patient's home    Distant Site (Provider Location): Provider Remote Setting- Home Office    Consent:  The patient/guardian has verbally consented to: the potential risks and benefits of telemedicine (video visit) versus in person care; bill my insurance or make self-payment for services provided; and responsibility for payment of non-covered services.     Mode of Communication:  Video Conference via [employer]    As the provider I attest to compliance with applicable laws and regulations related to telemedicine.    DATA  Interactive Complexity: No  Crisis: No      Progress Since Last Session (Related to Symptoms / Goals / Homework):  Symptoms: Some inconsistency with mood and anxiety due to relational stressors (mother) Focusing on finding balance in activities to support mood.    Homework: Achieved / completed to satisfaction      Episode of Care Goals: Satisfactory progress - ACTION (Actively working towards change); Intervened by reinforcing change plan / affirming steps taken     Current / Ongoing Stressors and Concerns:  Focus on self care-time outdoors and with family. Stopping notifications on phone, shifting workout schedule. Taking advantage of Saturdays off from work. Minimal contact with mother, managing contact with stepdad. Continuing desire to focus on positive-productive relationships/activities that support wellness,  learning to prioritize personal needs.       Treatment Objective(s) Addressed in This Session:   Use boundaries and proactive communication in relationships.   Improve quantity and quality of night time sleep / decrease daytime naps    Interventions:  Supportive Therapy: Provided active listening and validation. Taught and practiced Metta loving kindness meditation    Motivational Interviewing  Target Behavior: continue using and augmenting  boundaries, proactive communication skills to get needs met and proactive communication with friends and family, continue no contact with mom, focusing instead on healthy and supportive relationships. Reengage with goal focused effort toward health, nutrition, alcohol consumption and overall wellness-pursue partner collab     Stage of Change: ACTION (Actively working towards change)     MI Intervention: Expressed Empathy/Understanding, Supported Autonomy, Collaboration, Evocation, Permission to raise concern or advise, Open-ended questions, Reflections: simple and complex, Change talk (evoked) and Reframe     Change Talk Expressed by the Patient: Ability to change Reasons to change Need to change Committment to change Activation Taking steps    Provider Response to Change Talk: E - Evoked more info from patient about behavior change, A - Affirmed patient's thoughts, decisions, or attempts at behavior change, R - Reflected patient's change talk and S - Summarized patient's change talk statements     Assessments completed prior to visit: 2/15/21 DA  The following assessments were completed by patient for this visit:    PHQ9:   PHQ-9 SCORE 9/12/2022 10/3/2022 10/25/2022 11/14/2022 11/28/2022 1/2/2023 2/6/2023   PHQ-9 Total Score MyChart 4 (Minimal depression) 7 (Mild depression) 5 (Mild depression) 7 (Mild depression) 8 (Mild depression) 8 (Mild depression) 7 (Mild depression)   PHQ-9 Total Score 4 7 5 7 8 8 7     GAD7:   AYDIN-7 SCORE 12/13/2021 5/30/2022 8/16/2022 9/12/2022  10/25/2022 11/14/2022 11/28/2022   Total Score 14 (moderate anxiety) 9 (mild anxiety) 12 (moderate anxiety) 9 (mild anxiety) 7 (mild anxiety) 7 (mild anxiety) 11 (moderate anxiety)   Total Score 14 9 12 9 7 7 11     PROMIS 10-Global Health (only subscores and total score):   PROMIS-10 Scores Only 12/13/2021 3/28/2022 6/29/2022 10/3/2022 10/25/2022 2/6/2023   Global Mental Health Score 9 9 12 12 13 13   Global Physical Health Score 15 15 17 18 17 18   PROMIS TOTAL - SUBSCORES 24 24 29 30 30 31         ASSESSMENT: Current Emotional / Mental Status (status of significant symptoms):   Risk status (Self / Other harm or suicidal ideation)   Patient denies current fears or concerns for personal safety.   Patient denies current or recent suicidal ideation or behaviors.   Patient denies current or recent homicidal ideation or behaviors.   Patient denies current or recent self injurious behavior or ideation.   Patient denies other safety concerns.   Patient reports there has been no change in risk factors since their last session.     Patient reports there has been no change in protective factors since their last session.     Recommended that patient call 911 or go to the local ED should there be a change in any of these risk factors.     Appearance:   Appropriate    Eye Contact:   Good    Psychomotor Behavior: Normal    Attitude:   Cooperative  Friendly   Orientation:   All   Speech    Rate / Production: Normal     Volume:  Normal    Mood:    Normal, anxious   Affect:    Appropriate    Thought Content:  Clear    Thought Form:  Coherent  Logical    Insight:    Good      Medication Review:   No changes to current psychiatric medication(s)     Medication Compliance:   Yes     Changes in Health Issues:   None noted       Chemical Use Review:   Substance Use: Chemical use reviewed, no active concerns identified      Tobacco Use: No current tobacco use.      Diagnosis:  1. Major depressive disorder, recurrent episode, mild with  anxious distress (H)        Collateral Reports Completed:   Not Applicable    PLAN: (Patient Tasks / Therapist Tasks / Other)  Continue using PATRIZIA, FAST and Check the Facts engage intentionally with supportive people, prioritizing family time. Prioritize down time/quiet time, use podcasts, yoga, meditation, solo time activities.  Continue no communication with mother, boundary with stepdad,  Metta meditation. Collaborate in goal setting with partner    DEE Walden [facility] 2/6/23      _____________________________________________________________  Individual Treatment Plan    Patient's Name: Joey Spears  YOB: 1995    Date of Creation:   9/13/2021  Date Treatment Plan Last Reviewed/Revised: 2/6/23  There has been demonstrated improvement in functioning while patient has been engaged in psychotherapy/psychological service- if withdrawn the patient would deteriorate and/or relapse.     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    Anticipated number of session for this episode of care: 20-30  Anticipation frequency of session: Every other week  Anticipated Duration of each session: 38-52 minutes  Treatment plan will be reviewed in 90 days or when goals have been changed.     MeasurableTreatment Goal(s) related to diagnosis / functional impairment(s)  Goal 1: Client will experience an improvement in mood and anxiety evidenced by self report and AYDIN/PHQ scores of 5 or less    I will know I've met my goal when I'm enjoying my life more (paraphrase) .      Objective #A (Client Action)    Client will Increase interest, engagement, and pleasure in doing  things  Decrease frequency and intensity of feeling down, depressed, hopeless  Improve quantity and quality of night time sleep / decrease daytime naps  Feel less tired and more energy during the day   Identify negative self-talk and behaviors: challenge core beliefs, myths, and actions  Improve concentration, focus, and mindfulness in daily activities   Feel less fidgety, restless or slow in daily activities / interpersonal interactions  Use boundaries and proactive communication in relationships.  Status:    2/6/23  Intervention(s)  Therapist will teach behavioral activation, sleep hygiene, CBT, DBT and ACT skills, proactive communication, mindfulness, grounding skills to support mood improvement and anxiety reduction.    Patient has reviewed and agreed to the above plan.      DEE Walden,  LICSW 2/6/23    Answers for HPI/ROS submitted by the patient on 8/16/2022  If you checked off any problems, how difficult have these problems made it for you to do your work, take care of things at home, or get along with other people?: Somewhat difficult  PHQ9 TOTAL SCORE: 8  AYDIN 7 TOTAL SCORE: 12

## 2023-02-27 ENCOUNTER — VIRTUAL VISIT (OUTPATIENT)
Dept: PSYCHOLOGY | Facility: CLINIC | Age: 28
End: 2023-02-27
Payer: COMMERCIAL

## 2023-02-27 DIAGNOSIS — F33.0 MAJOR DEPRESSIVE DISORDER, RECURRENT EPISODE, MILD WITH ANXIOUS DISTRESS (H): Primary | ICD-10-CM

## 2023-02-27 PROCEDURE — 90834 PSYTX W PT 45 MINUTES: CPT | Mod: VID

## 2023-02-27 NOTE — PROGRESS NOTES
[facility]                                     Progress Note    Patient Name: Joey Spears  Date:   2/27/2023        Service Type: Individual      Session Start Time: 10:00 am  Session End Time: 10:45 am     Session Length: 45 min    Session #: 36    Attendees: Client attended alone    Service Modality:    Telemedicine Visit: The patient's condition can be safely assessed and treated via synchronous audio and visual telemedicine encounter.      Reason for Telemedicine Visit: Patient has requested telehealth visit    Originating Site (Patient Location): Patient's home    Distant Site (Provider Location): Provider Remote Setting- Home Office    Consent:  The patient/guardian has verbally consented to: the potential risks and benefits of telemedicine (video visit) versus in person care; bill my insurance or make self-payment for services provided; and responsibility for payment of non-covered services.     Mode of Communication:  Video Conference via [employer]    As the provider I attest to compliance with applicable laws and regulations related to telemedicine.    DATA  Interactive Complexity: No  Crisis: No      Progress Since Last Session (Related to Symptoms / Goals / Homework):  Symptoms: Some inconsistency with mood and anxiety due to relational stressors (mother) Focusing on finding balance in activities to support mood.    Homework: Achieved / completed to satisfaction      Episode of Care Goals: Satisfactory progress - ACTION (Actively working towards change); Intervened by reinforcing change plan / affirming steps taken     Current / Ongoing Stressors and Concerns:  Focus on self care-time outdoors and with family. Mom who lives in [place] sent an upsetting email to which pt is not responding. Taking advantage of Saturdays off from work. Minimal contact with mother, managing contact with stepdad. Continuing desire to focus on positive-productive relationships/activities that  support wellness, learning to prioritize personal needs.       Treatment Objective(s) Addressed in This Session:   Use boundaries and proactive communication in relationships.   Improve quantity and quality of night time sleep / decrease daytime naps    Interventions:  Supportive Therapy: Provided active listening and validation. Supported continued use of boundaries and limits and reflection of personal and professional priorities.     Motivational Interviewing  Target Behavior: continue using and augmenting  boundaries, proactive communication skills to get needs met and proactive communication with friends and family, continue no contact with mom, focusing instead on healthy and supportive relationships. Reengage with goal focused effort toward health, nutrition, alcohol consumption and overall wellness-pursue partner collab     Stage of Change: ACTION (Actively working towards change)     MI Intervention: Expressed Empathy/Understanding, Supported Autonomy, Collaboration, Evocation, Permission to raise concern or advise, Open-ended questions, Reflections: simple and complex, Change talk (evoked) and Reframe     Change Talk Expressed by the Patient: Ability to change Reasons to change Need to change Committment to change Activation Taking steps    Provider Response to Change Talk: E - Evoked more info from patient about behavior change, A - Affirmed patient's thoughts, decisions, or attempts at behavior change, R - Reflected patient's change talk and S - Summarized patient's change talk statements     Assessments completed prior to visit: 2/15/21 DA  The following assessments were completed by patient for this visit:    PHQ9:   PHQ-9 SCORE 10/3/2022 10/25/2022 11/14/2022 11/28/2022 1/2/2023 2/6/2023 2/27/2023   PHQ-9 Total Score MyChart 7 (Mild depression) 5 (Mild depression) 7 (Mild depression) 8 (Mild depression) 8 (Mild depression) 7 (Mild depression) 5 (Mild depression)   PHQ-9 Total Score 7 5 7 8 8 7 5      GAD7:   AYDIN-7 SCORE 12/13/2021 5/30/2022 8/16/2022 9/12/2022 10/25/2022 11/14/2022 11/28/2022   Total Score 14 (moderate anxiety) 9 (mild anxiety) 12 (moderate anxiety) 9 (mild anxiety) 7 (mild anxiety) 7 (mild anxiety) 11 (moderate anxiety)   Total Score 14 9 12 9 7 7 11     PROMIS 10-Global Health (only subscores and total score):   PROMIS-10 Scores Only 12/13/2021 3/28/2022 6/29/2022 10/3/2022 10/25/2022 2/6/2023 2/20/2023   Global Mental Health Score 9 9 12 12 13 13 13   Global Physical Health Score 15 15 17 18 17 18 17   PROMIS TOTAL - SUBSCORES 24 24 29 30 30 31 30         ASSESSMENT: Current Emotional / Mental Status (status of significant symptoms):   Risk status (Self / Other harm or suicidal ideation)   Patient denies current fears or concerns for personal safety.   Patient denies current or recent suicidal ideation or behaviors.   Patient denies current or recent homicidal ideation or behaviors.   Patient denies current or recent self injurious behavior or ideation.   Patient denies other safety concerns.   Patient reports there has been no change in risk factors since their last session.     Patient reports there has been no change in protective factors since their last session.     Recommended that patient call 911 or go to the local ED should there be a change in any of these risk factors.     Appearance:   Appropriate    Eye Contact:   Good    Psychomotor Behavior: Normal    Attitude:   Cooperative  Friendly   Orientation:   All   Speech    Rate / Production: Normal     Volume:  Normal    Mood:    Normal,   Affect:    Appropriate    Thought Content:  Clear    Thought Form:  Coherent  Goal Directed  Logical    Insight:    Good      Medication Review:   No changes to current psychiatric medication(s)     Medication Compliance:   Yes     Changes in Health Issues:   None noted       Chemical Use Review:   Substance Use: Chemical use reviewed, no active concerns identified      Tobacco Use: No current  tobacco use.      Diagnosis:  1. Major depressive disorder, recurrent episode, mild with anxious distress (H)        Collateral Reports Completed:   Not Applicable    PLAN: (Patient Tasks / Therapist Tasks / Other)  Continue using PATRIZIA, FAST and Check the Facts engage intentionally with supportive people, prioritizing family time. Prioritize down time/quiet time, use podcasts, yoga, meditation, solo time activities.  Continue no communication with mother, boundary with stepdad,  Metta meditation. Collaborate in goal setting with partner    DEE Walden [facility] 2/27/23      _____________________________________________________________  Individual Treatment Plan    Patient's Name: Joey Spears  YOB: 1995    Date of Creation:   9/13/2021  Date Treatment Plan Last Reviewed/Revised: 2/6/23  There has been demonstrated improvement in functioning while patient has been engaged in psychotherapy/psychological service- if withdrawn the patient would deteriorate and/or relapse.     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    Anticipated number of session for this episode of care: 20-30  Anticipation frequency of session: Every other week  Anticipated Duration of each session: 38-52 minutes  Treatment plan will be reviewed in 90 days or when goals have been changed.     MeasurableTreatment Goal(s) related to diagnosis / functional impairment(s)  Goal 1: Client will experience an improvement in mood and anxiety evidenced by self report and AYDIN/PHQ scores of 5 or less    I will know I've met my goal when I'm enjoying my life more (paraphrase) .      Objective #A  (Client Action)    Client will Increase interest, engagement, and pleasure in doing things  Decrease frequency and intensity of feeling down, depressed, hopeless  Improve quantity and quality of night time sleep / decrease daytime naps  Feel less tired and more energy during the day   Identify negative self-talk and behaviors: challenge core beliefs, myths, and actions  Improve concentration, focus, and mindfulness in daily activities   Feel less fidgety, restless or slow in daily activities / interpersonal interactions  Use boundaries and proactive communication in relationships.  Status:    2/6/23  Intervention(s)  Therapist will teach behavioral activation, sleep hygiene, CBT, DBT and ACT skills, proactive communication, mindfulness, grounding skills to support mood improvement and anxiety reduction.    Patient has reviewed and agreed to the above plan.      DEE Walden,  LICSW 2/6/23    Answers for HPI/ROS submitted by the patient on 8/16/2022  If you checked off any problems, how difficult have these problems made it for you to do your work, take care of things at home, or get along with other people?: Somewhat difficult  PHQ9 TOTAL SCORE: 8  AYDIN 7 TOTAL SCORE: 12

## 2023-03-13 ENCOUNTER — VIRTUAL VISIT (OUTPATIENT)
Dept: PSYCHOLOGY | Facility: CLINIC | Age: 28
End: 2023-03-13
Payer: COMMERCIAL

## 2023-03-13 DIAGNOSIS — F33.0 MAJOR DEPRESSIVE DISORDER, RECURRENT EPISODE, MILD WITH ANXIOUS DISTRESS (H): Primary | ICD-10-CM

## 2023-03-13 PROCEDURE — 90834 PSYTX W PT 45 MINUTES: CPT | Mod: VID

## 2023-03-13 ASSESSMENT — PATIENT HEALTH QUESTIONNAIRE - PHQ9
SUM OF ALL RESPONSES TO PHQ QUESTIONS 1-9: 6
10. IF YOU CHECKED OFF ANY PROBLEMS, HOW DIFFICULT HAVE THESE PROBLEMS MADE IT FOR YOU TO DO YOUR WORK, TAKE CARE OF THINGS AT HOME, OR GET ALONG WITH OTHER PEOPLE: SOMEWHAT DIFFICULT
SUM OF ALL RESPONSES TO PHQ QUESTIONS 1-9: 6

## 2023-03-13 NOTE — PROGRESS NOTES
[facility]                                     Progress Note    Patient Name: Joey Spears  Date:   3/13/2023        Service Type: Individual      Session Start Time: 9:00 am  Session End Time: 9:45 am     Session Length: 45 min    Session #: 37    Attendees: Client attended alone    Service Modality:    Telemedicine Visit: The patient's condition can be safely assessed and treated via synchronous audio and visual telemedicine encounter.      Reason for Telemedicine Visit: Patient has requested telehealth visit    Originating Site (Patient Location): Patient's home    Distant Site (Provider Location): Provider Remote Setting- Home Office    Consent:  The patient/guardian has verbally consented to: the potential risks and benefits of telemedicine (video visit) versus in person care; bill my insurance or make self-payment for services provided; and responsibility for payment of non-covered services.     Mode of Communication:  Video Conference via [employer]    As the provider I attest to compliance with applicable laws and regulations related to telemedicine.    DATA  Interactive Complexity: No  Crisis: No      Progress Since Last Session (Related to Symptoms / Goals / Homework):  Symptoms: 'improving Focusing on finding balance in activities to support mood.    Homework: Achieved / completed to satisfaction      Episode of Care Goals: Satisfactory progress - ACTION (Actively working towards change); Intervened by reinforcing change plan / affirming steps taken     Current / Ongoing Stressors and Concerns:  Focus on self care-time outdoors and with family. No recent contact from mom who lives in [place]. Taking advantage of Saturdays off from work. Managing contact with stepdad. Continuing desire to focus on positive-productive relationships/activities that support wellness, learning to prioritize personal needs.       Treatment Objective(s) Addressed in This Session:   Use boundaries  and proactive communication in relationships.   Improve quantity and quality of night time sleep / decrease daytime naps    Interventions:  Supportive Therapy: Provided active listening and validation. Supported continued use of boundaries and limits and reflection of personal and professional priorities.     Motivational Interviewing  Target Behavior: continue using and augmenting  boundaries, proactive communication skills to get needs met and proactive communication with friends and family, continue no contact with mom, focusing instead on healthy and supportive relationships. Reengage with goal focused effort toward health, nutrition, alcohol consumption and overall wellness-pursue partner collab     Stage of Change: ACTION (Actively working towards change)     MI Intervention: Expressed Empathy/Understanding, Supported Autonomy, Collaboration, Evocation, Permission to raise concern or advise, Open-ended questions, Reflections: simple and complex, Change talk (evoked) and Reframe     Change Talk Expressed by the Patient: Ability to change Reasons to change Need to change Committment to change Activation Taking steps    Provider Response to Change Talk: E - Evoked more info from patient about behavior change, A - Affirmed patient's thoughts, decisions, or attempts at behavior change, R - Reflected patient's change talk and S - Summarized patient's change talk statements     Assessments completed prior to visit: 2/15/21 DA  The following assessments were completed by patient for this visit:    PHQ9:   PHQ-9 SCORE 10/25/2022 11/14/2022 11/28/2022 1/2/2023 2/6/2023 2/27/2023 3/13/2023   PHQ-9 Total Score MyChart 5 (Mild depression) 7 (Mild depression) 8 (Mild depression) 8 (Mild depression) 7 (Mild depression) 5 (Mild depression) 6 (Mild depression)   PHQ-9 Total Score 5 7 8 8 7 5 6     GAD7:   AYDIN-7 SCORE 12/13/2021 5/30/2022 8/16/2022 9/12/2022 10/25/2022 11/14/2022 11/28/2022   Total Score 14 (moderate anxiety) 9  (mild anxiety) 12 (moderate anxiety) 9 (mild anxiety) 7 (mild anxiety) 7 (mild anxiety) 11 (moderate anxiety)   Total Score 14 9 12 9 7 7 11     PROMIS 10-Global Health (only subscores and total score):   PROMIS-10 Scores Only 12/13/2021 3/28/2022 6/29/2022 10/3/2022 10/25/2022 2/6/2023 2/20/2023   Global Mental Health Score 9 9 12 12 13 13 13   Global Physical Health Score 15 15 17 18 17 18 17   PROMIS TOTAL - SUBSCORES 24 24 29 30 30 31 30         ASSESSMENT: Current Emotional / Mental Status (status of significant symptoms):   Risk status (Self / Other harm or suicidal ideation)   Patient denies current fears or concerns for personal safety.   Patient denies current or recent suicidal ideation or behaviors.   Patient denies current or recent homicidal ideation or behaviors.   Patient denies current or recent self injurious behavior or ideation.   Patient denies other safety concerns.   Patient reports there has been no change in risk factors since their last session.     Patient reports there has been no change in protective factors since their last session.     Recommended that patient call 911 or go to the local ED should there be a change in any of these risk factors.     Appearance:   Appropriate    Eye Contact:   Good    Psychomotor Behavior: Normal    Attitude:   Cooperative  Friendly   Orientation:   All   Speech    Rate / Production: Normal     Volume:  Normal    Mood:    Normal,   Affect:    Appropriate    Thought Content:  Clear    Thought Form:  Coherent  Goal Directed  Logical    Insight:    Good      Medication Review:   No changes to current psychiatric medication(s)     Medication Compliance:   Yes     Changes in Health Issues:   None noted       Chemical Use Review:   Substance Use: Chemical use reviewed, no active concerns identified      Tobacco Use: No current tobacco use.      Diagnosis:  1. Major depressive disorder, recurrent episode, mild with anxious distress (H)        Collateral Reports  Completed:   Not Applicable    PLAN: (Patient Tasks / Therapist Tasks / Other)  Continue using PATRIZIA, FAST and Check the Facts engage intentionally with supportive people, prioritizing family time. Prioritize down time/quiet time, use podcasts, yoga, meditation, solo time activities.  Continue no communication with mother, boundary with stepdad,  Metta meditation. Collaborate in goal setting with partner    DEE Walden [facility] 3/13/23      _____________________________________________________________  Individual Treatment Plan    Patient's Name: Joey Spears  YOB: 1995    Date of Creation:   9/13/2021  Date Treatment Plan Last Reviewed/Revised: 2/6/23  There has been demonstrated improvement in functioning while patient has been engaged in psychotherapy/psychological service- if withdrawn the patient would deteriorate and/or relapse.     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    Anticipated number of session for this episode of care: 20-30  Anticipation frequency of session: Every other week  Anticipated Duration of each session: 38-52 minutes  Treatment plan will be reviewed in 90 days or when goals have been changed.     MeasurableTreatment Goal(s) related to diagnosis / functional impairment(s)  Goal 1: Client will experience an improvement in mood and anxiety evidenced by self report and AYDIN/PHQ scores of 5 or less    I will know I've met my goal when I'm enjoying my life more (paraphrase) .      Objective #A (Client Action)    Client will Increase interest, engagement, and pleasure in doing things  Decrease frequency and intensity of feeling  down, depressed, hopeless  Improve quantity and quality of night time sleep / decrease daytime naps  Feel less tired and more energy during the day   Identify negative self-talk and behaviors: challenge core beliefs, myths, and actions  Improve concentration, focus, and mindfulness in daily activities   Feel less fidgety, restless or slow in daily activities / interpersonal interactions  Use boundaries and proactive communication in relationships.  Status:    2/6/23  Intervention(s)  Therapist will teach behavioral activation, sleep hygiene, CBT, DBT and ACT skills, proactive communication, mindfulness, grounding skills to support mood improvement and anxiety reduction.    Patient has reviewed and agreed to the above plan.      DEE Walden,  LICSW 2/6/23    Answers for HPI/ROS submitted by the patient on 8/16/2022  If you checked off any problems, how difficult have these problems made it for you to do your work, take care of things at home, or get along with other people?: Somewhat difficult  PHQ9 TOTAL SCORE: 8  AYDIN 7 TOTAL SCORE: 12

## 2023-04-10 ENCOUNTER — VIRTUAL VISIT (OUTPATIENT)
Dept: PSYCHOLOGY | Facility: CLINIC | Age: 28
End: 2023-04-10
Payer: COMMERCIAL

## 2023-04-10 DIAGNOSIS — F33.0 MAJOR DEPRESSIVE DISORDER, RECURRENT EPISODE, MILD WITH ANXIOUS DISTRESS (H): Primary | ICD-10-CM

## 2023-04-10 PROCEDURE — 90834 PSYTX W PT 45 MINUTES: CPT | Mod: VID

## 2023-04-10 NOTE — PROGRESS NOTES
[facility]                                     Progress Note    Patient Name: Joey Spears  Date:   4/10/2023        Service Type: Individual      Session Start Time: 9:00 am  Session End Time: 9:45 am     Session Length: 45 min    Session #: 38    Attendees: Client attended alone    Service Modality:    Telemedicine Visit: The patient's condition can be safely assessed and treated via synchronous audio and visual telemedicine encounter.      Reason for Telemedicine Visit: Patient has requested telehealth visit    Originating Site (Patient Location): Patient's home    Distant Site (Provider Location): Provider Remote Setting- Home Office    Consent:  The patient/guardian has verbally consented to: the potential risks and benefits of telemedicine (video visit) versus in person care; bill my insurance or make self-payment for services provided; and responsibility for payment of non-covered services.     Mode of Communication:  Video Conference via [employer]    As the provider I attest to compliance with applicable laws and regulations related to telemedicine.    DATA  Interactive Complexity: No  Crisis: No      Progress Since Last Session (Related to Symptoms / Goals / Homework):  Symptoms: 'improving Focusing on finding balance in activities to support mood.    Homework: Achieved / completed to satisfaction      Episode of Care Goals: Satisfactory progress - ACTION (Actively working towards change); Intervened by reinforcing change plan / affirming steps taken     Current / Ongoing Stressors and Concerns:  Mom visiting from out of town, seeking a meeting. 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 Focus on self care-time outdoors and with family. Taking advantage of Saturdays off from work. Managing contact with stepdad. Continuing desire to focus on positive-productive relationships/activities that support wellness, learning to prioritize personal  needs.       Treatment Objective(s) Addressed in This Session:   Use boundaries and proactive communication in relationships.   Improve quantity and quality of night time sleep / decrease daytime naps    Interventions:  Supportive Therapy: Provided active listening and validation. Supported continued use of boundaries and limits and reflection of personal and professional priorities.     Motivational Interviewing  Target Behavior: continue using and augmenting  boundaries, proactive communication skills to get needs met and proactive communication with friends and family, continue no contact with mom, focusing on healthy and supportive relationships. Reengage with goal focused effort toward health  Stage of Change: ACTION (Actively working towards change)     MI Intervention: Expressed Empathy/Understanding, Supported Autonomy, Collaboration, Evocation, Reflections: simple and complex and Change talk (evoked)     Change Talk Expressed by the Patient: Ability to change Reasons to change Need to change Committment to change Activation Taking steps    Provider Response to Change Talk: E - Evoked more info from patient about behavior change, A - Affirmed patient's thoughts, decisions, or attempts at behavior change, R - Reflected patient's change talk and S - Summarized patient's change talk statements     Assessments completed prior to visit: 2/15/21 DA  The following assessments were completed by patient for this visit:      PHQ9:       10/25/2022     3:56 AM 11/14/2022     8:38 AM 11/28/2022     8:13 AM 1/2/2023     2:35 PM 2/6/2023     9:40 AM 2/27/2023     9:47 AM 3/13/2023     8:50 AM   PHQ-9 SCORE   PHQ-9 Total Score MyChart 5 (Mild depression) 7 (Mild depression) 8 (Mild depression) 8 (Mild depression) 7 (Mild depression) 5 (Mild depression) 6 (Mild depression)   PHQ-9 Total Score 5 7 8 8 7 5 6     GAD7:       12/13/2021     9:29 AM 5/30/2022     9:40 PM 8/16/2022     7:04 AM 9/12/2022     7:41 AM 10/25/2022      3:57 AM 11/14/2022     8:40 AM 11/28/2022     8:14 AM   AYDIN-7 SCORE   Total Score 14 (moderate anxiety) 9 (mild anxiety) 12 (moderate anxiety) 9 (mild anxiety) 7 (mild anxiety) 7 (mild anxiety) 11 (moderate anxiety)   Total Score 14 9 12 9 7 7 11     PROMIS 10-Global Health (only subscores and total score):       12/13/2021     9:31 AM 3/28/2022     9:17 AM 6/29/2022     4:37 AM 10/3/2022     7:49 AM 10/25/2022     3:59 AM 2/6/2023     9:41 AM 2/20/2023     9:13 AM   PROMIS-10 Scores Only   Global Mental Health Score 9 9 12 12 13 13 13   Global Physical Health Score 15 15 17 18 17 18 17   PROMIS TOTAL - SUBSCORES 24 24 29 30 30 31 30         ASSESSMENT: Current Emotional / Mental Status (status of significant symptoms):   Risk status (Self / Other harm or suicidal ideation)   Patient denies current fears or concerns for personal safety.   Patient denies current or recent suicidal ideation or behaviors.   Patient denies current or recent homicidal ideation or behaviors.   Patient denies current or recent self injurious behavior or ideation.   Patient denies other safety concerns.   Patient reports there has been no change in risk factors since their last session.     Patient reports there has been no change in protective factors since their last session.     Recommended that patient call 911 or go to the local ED should there be a change in any of these risk factors.     Appearance:   Appropriate    Eye Contact:   Good    Psychomotor Behavior: Normal    Attitude:   Cooperative  Friendly   Orientation:   All   Speech    Rate / Production: Normal     Volume:  Normal    Mood:    Normal,   Affect:    Appropriate    Thought Content:  Clear    Thought Form:  Coherent  Goal Directed  Logical    Insight:    Good      Medication Review:   No changes to current psychiatric medication(s)     Medication Compliance:   Yes     Changes in Health Issues:   None noted       Chemical Use Review:   Substance Use: Chemical use reviewed,  no active concerns identified      Tobacco Use: No current tobacco use.      Diagnosis:  1. Major depressive disorder, recurrent episode, mild with anxious distress (H)        Collateral Reports Completed:   Not Applicable    PLAN: (Patient Tasks / Therapist Tasks / Other)  Continue using PATRIZIA, FAST and Check the Facts engage intentionally with supportive people, prioritizing family time. Prioritize down time/quiet time, use podcasts, yoga, meditation, solo time activities.  Continue no communication with mother, boundary with stepdad,   Collaborate in goal setting with partner    DEE Walden [facility] 4/10/23      _____________________________________________________________  Individual Treatment Plan    Patient's Name: Joey Spears  YOB: 1995    Date of Creation:   9/13/2021  Date Treatment Plan Last Reviewed/Revised: 2/6/23  There has been demonstrated improvement in functioning while patient has been engaged in psychotherapy/psychological service- if withdrawn the patient would deteriorate and/or relapse.     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    Anticipated number of session for this episode of care: 20-30  Anticipation frequency of session: Every other week  Anticipated Duration of each session: 38-52 minutes  Treatment plan will be reviewed in 90 days or when goals have been changed.     MeasurableTreatment Goal(s) related to diagnosis / functional impairment(s)  Goal 1: Client will experience an improvement in mood and anxiety evidenced by self report and AYDIN/PHQ scores of 5 or less    I will know I've met my goal when I'm enjoying my  life more (paraphrase) .      Objective #A (Client Action)    Client will Increase interest, engagement, and pleasure in doing things  Decrease frequency and intensity of feeling down, depressed, hopeless  Improve quantity and quality of night time sleep / decrease daytime naps  Feel less tired and more energy during the day   Identify negative self-talk and behaviors: challenge core beliefs, myths, and actions  Improve concentration, focus, and mindfulness in daily activities   Feel less fidgety, restless or slow in daily activities / interpersonal interactions  Use boundaries and proactive communication in relationships.  Status:    2/6/23  Intervention(s)  Therapist will teach behavioral activation, sleep hygiene, CBT, DBT and ACT skills, proactive communication, mindfulness, grounding skills to support mood improvement and anxiety reduction.    Patient has reviewed and agreed to the above plan.      DEE Walden,  [facility] 2/6/23    Answers for HPI/ROS submitted by the patient on 8/16/2022  If you checked off any problems, how difficult have these problems made it for you to do your work, take care of things at home, or get along with other people?: Somewhat difficult  PHQ9 TOTAL SCORE: 8  AYDIN 7 TOTAL SCORE: 12

## 2023-04-24 ENCOUNTER — VIRTUAL VISIT (OUTPATIENT)
Dept: PSYCHOLOGY | Facility: CLINIC | Age: 28
End: 2023-04-24
Payer: COMMERCIAL

## 2023-04-24 DIAGNOSIS — F33.0 MAJOR DEPRESSIVE DISORDER, RECURRENT EPISODE, MILD WITH ANXIOUS DISTRESS (H): Primary | ICD-10-CM

## 2023-04-24 PROCEDURE — 90834 PSYTX W PT 45 MINUTES: CPT | Mod: VID

## 2023-04-24 NOTE — PROGRESS NOTES
[facility]                                     Progress Note    Patient Name: Joey Spears  Date:   4/24/2023        Service Type: Individual      Session Start Time: 9:00 am  Session End Time: 9:45 am     Session Length: 45 min    Session #: 39    Attendees: Client attended alone    Service Modality:    Telemedicine Visit: The patient's condition can be safely assessed and treated via synchronous audio and visual telemedicine encounter.      Reason for Telemedicine Visit: Patient has requested telehealth visit    Originating Site (Patient Location): Patient's home    Distant Site (Provider Location): Provider Remote Setting- Home Office    Consent:  The patient/guardian has verbally consented to: the potential risks and benefits of telemedicine (video visit) versus in person care; bill my insurance or make self-payment for services provided; and responsibility for payment of non-covered services.     Mode of Communication:  Video Conference via [employer]    As the provider I attest to compliance with applicable laws and regulations related to telemedicine.    DATA  Interactive Complexity: No  Crisis: No      Progress Since Last Session (Related to Symptoms / Goals / Homework):  Symptoms: 'improving Focusing on finding balance in activities to support mood.    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 Recognizing opportunity for growth/new challenges. Focus on boundaries, self care.. Taking advantage of Saturdays off from work. Managing contact with stepdad. Continuing desire to focus on positive-productive relationships/activities that support wellness, learning to prioritize personal needs.       Treatment  Objective(s) Addressed in This Session:   Use boundaries and proactive communication in relationships.   Improve quantity and quality of night time sleep / decrease daytime naps   Identify negative self-talk and behaviors: challenge core beliefs, myths, and actions    Interventions:  Supportive Therapy: Provided active listening and validation. Supported continued use of boundaries and limits and reflection of personal and professional priorities.     Motivational Interviewing  Target Behavior: continue using and augmenting  boundaries, proactive communication skills to get needs met, continue no contact with mom, focusing on healthy and supportive relationships.     Stage of Change: ACTION (Actively working towards change)     MI Intervention: Expressed Empathy/Understanding, Supported Autonomy, Collaboration, Evocation, Reflections: simple and complex and Change talk (evoked)     Change Talk Expressed by the Patient: Ability to change Reasons to change Need to change Committment to change Activation Taking steps    Provider Response to Change Talk: E - Evoked more info from patient about behavior change, A - Affirmed patient's thoughts, decisions, or attempts at behavior change, R - Reflected patient's change talk and S - Summarized patient's change talk statements     Assessments completed prior to visit: 2/15/21 DA  The following assessments were completed by patient for this visit:      PHQ9:       10/25/2022     3:56 AM 11/14/2022     8:38 AM 11/28/2022     8:13 AM 1/2/2023     2:35 PM 2/6/2023     9:40 AM 2/27/2023     9:47 AM 3/13/2023     8:50 AM   PHQ-9 SCORE   PHQ-9 Total Score MyChart 5 (Mild depression) 7 (Mild depression) 8 (Mild depression) 8 (Mild depression) 7 (Mild depression) 5 (Mild depression) 6 (Mild depression)   PHQ-9 Total Score 5 7 8 8 7 5 6     GAD7:       12/13/2021     9:29 AM 5/30/2022     9:40 PM 8/16/2022     7:04 AM 9/12/2022     7:41 AM 10/25/2022     3:57 AM 11/14/2022     8:40 AM  11/28/2022     8:14 AM   AYDIN-7 SCORE   Total Score 14 (moderate anxiety) 9 (mild anxiety) 12 (moderate anxiety) 9 (mild anxiety) 7 (mild anxiety) 7 (mild anxiety) 11 (moderate anxiety)   Total Score 14 9 12 9 7 7 11     PROMIS 10-Global Health (only subscores and total score):       12/13/2021     9:31 AM 3/28/2022     9:17 AM 6/29/2022     4:37 AM 10/3/2022     7:49 AM 10/25/2022     3:59 AM 2/6/2023     9:41 AM 2/20/2023     9:13 AM   PROMIS-10 Scores Only   Global Mental Health Score 9 9 12 12 13 13 13   Global Physical Health Score 15 15 17 18 17 18 17   PROMIS TOTAL - SUBSCORES 24 24 29 30 30 31 30         ASSESSMENT: Current Emotional / Mental Status (status of significant symptoms):   Risk status (Self / Other harm or suicidal ideation)   Patient denies current fears or concerns for personal safety.   Patient denies current or recent suicidal ideation or behaviors.   Patient denies current or recent homicidal ideation or behaviors.   Patient denies current or recent self injurious behavior or ideation.   Patient denies other safety concerns.   Patient reports there has been no change in risk factors since their last session.     Patient reports there has been no change in protective factors since their last session.     Recommended that patient call 911 or go to the local ED should there be a change in any of these risk factors.     Appearance:   Appropriate    Eye Contact:   Good    Psychomotor Behavior: Normal    Attitude:   Cooperative  Friendly   Orientation:   All   Speech    Rate / Production: Normal     Volume:  Normal    Mood:    Anxious  Normal,   Affect:    Appropriate    Thought Content:  Clear    Thought Form:  Coherent  Goal Directed  Logical    Insight:    Good      Medication Review:   No changes to current psychiatric medication(s)     Medication Compliance:   Yes     Changes in Health Issues:   None noted       Chemical Use Review:   Substance Use: Chemical use reviewed, no active concerns  identified      Tobacco Use: No current tobacco use.      Diagnosis:  1. Major depressive disorder, recurrent episode, mild with anxious distress (H)        Collateral Reports Completed:   Not Applicable    PLAN: (Patient Tasks / Therapist Tasks / Other)  Continue using PATRIZIA, FAST and Check the Facts engage intentionally with supportive people, prioritize family/friend  time. Prioritize down time/quiet time, use podcasts, yoga, meditation, solo time activities.  Continue no communication with mother, boundary with stepdad.       DEE Walden [facility] 4/24/23      _____________________________________________________________  Individual Treatment Plan    Patient's Name: Joey Spears  YOB: 1995    Date of Creation:   9/13/2021  Date Treatment Plan Last Reviewed/Revised: 2/6/23  There has been demonstrated improvement in functioning while patient has been engaged in psychotherapy/psychological service- if withdrawn the patient would deteriorate and/or relapse.     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    Anticipated number of session for this episode of care: 20-30  Anticipation frequency of session: Every other week  Anticipated Duration of each session: 38-52 minutes  Treatment plan will be reviewed in 90 days or when goals have been changed.     MeasurableTreatment Goal(s) related to diagnosis / functional impairment(s)  Goal 1: Client will experience an improvement in mood and anxiety evidenced by self report and AYDIN/PHQ scores of 5 or less    I will know I've met my goal when I'm enjoying my life more (paraphrase) .      Objective #A (Client  Action)    Client will Increase interest, engagement, and pleasure in doing things  Decrease frequency and intensity of feeling down, depressed, hopeless  Improve quantity and quality of night time sleep / decrease daytime naps  Feel less tired and more energy during the day   Identify negative self-talk and behaviors: challenge core beliefs, myths, and actions  Improve concentration, focus, and mindfulness in daily activities   Feel less fidgety, restless or slow in daily activities / interpersonal interactions  Use boundaries and proactive communication in relationships.  Status:    2/6/23  Intervention(s)  Therapist will teach behavioral activation, sleep hygiene, CBT, DBT and ACT skills, proactive communication, mindfulness, grounding skills to support mood improvement and anxiety reduction.    Patient has reviewed and agreed to the above plan.      DEE Walden,  [facility] 2/6/23    Answers for HPI/ROS submitted by the patient on 8/16/2022  If you checked off any problems, how difficult have these problems made it for you to do your work, take care of things at home, or get along with other people?: Somewhat difficult  PHQ9 TOTAL SCORE: 8  AYDIN 7 TOTAL SCORE: 12

## 2023-05-08 ENCOUNTER — HEALTH MAINTENANCE LETTER (OUTPATIENT)
Age: 28
End: 2023-05-08

## 2023-05-22 ENCOUNTER — VIRTUAL VISIT (OUTPATIENT)
Dept: PSYCHOLOGY | Facility: CLINIC | Age: 28
End: 2023-05-22
Payer: COMMERCIAL

## 2023-05-22 DIAGNOSIS — F33.0 MAJOR DEPRESSIVE DISORDER, RECURRENT EPISODE, MILD WITH ANXIOUS DISTRESS (H): Primary | ICD-10-CM

## 2023-05-22 PROCEDURE — 90834 PSYTX W PT 45 MINUTES: CPT | Mod: VID

## 2023-05-22 ASSESSMENT — ANXIETY QUESTIONNAIRES
4. TROUBLE RELAXING: MORE THAN HALF THE DAYS
2. NOT BEING ABLE TO STOP OR CONTROL WORRYING: SEVERAL DAYS
3. WORRYING TOO MUCH ABOUT DIFFERENT THINGS: SEVERAL DAYS
IF YOU CHECKED OFF ANY PROBLEMS ON THIS QUESTIONNAIRE, HOW DIFFICULT HAVE THESE PROBLEMS MADE IT FOR YOU TO DO YOUR WORK, TAKE CARE OF THINGS AT HOME, OR GET ALONG WITH OTHER PEOPLE: SOMEWHAT DIFFICULT
GAD7 TOTAL SCORE: 10
7. FEELING AFRAID AS IF SOMETHING AWFUL MIGHT HAPPEN: SEVERAL DAYS
8. IF YOU CHECKED OFF ANY PROBLEMS, HOW DIFFICULT HAVE THESE MADE IT FOR YOU TO DO YOUR WORK, TAKE CARE OF THINGS AT HOME, OR GET ALONG WITH OTHER PEOPLE?: SOMEWHAT DIFFICULT
7. FEELING AFRAID AS IF SOMETHING AWFUL MIGHT HAPPEN: SEVERAL DAYS
GAD7 TOTAL SCORE: 10
GAD7 TOTAL SCORE: 10
6. BECOMING EASILY ANNOYED OR IRRITABLE: MORE THAN HALF THE DAYS
1. FEELING NERVOUS, ANXIOUS, OR ON EDGE: MORE THAN HALF THE DAYS
5. BEING SO RESTLESS THAT IT IS HARD TO SIT STILL: SEVERAL DAYS

## 2023-05-22 NOTE — PROGRESS NOTES
[facility]                                     Progress Note    Patient Name: Joey Spears  Date:   5/22/2023        Service Type: Individual      Session Start Time: 10:00 am  Session End Time: 10:45 am     Session Length: 45 min    Session #: 40    Attendees: Client attended alone    Service Modality:    Telemedicine Visit: The patient's condition can be safely assessed and treated via synchronous audio and visual telemedicine encounter.      Reason for Telemedicine Visit: Patient has requested telehealth visit    Originating Site (Patient Location): Patient's home    Distant Site (Provider Location): Provider Remote Setting- Home Office    Consent:  The patient/guardian has verbally consented to: the potential risks and benefits of telemedicine (video visit) versus in person care; bill my insurance or make self-payment for services provided; and responsibility for payment of non-covered services.     Mode of Communication:  Video Conference via [employer]    As the provider I attest to compliance with applicable laws and regulations related to telemedicine.    DATA  Interactive Complexity: No  Crisis: No      Progress Since Last Session (Related to Symptoms / Goals / Homework):  Symptoms: 'improving Focusing on finding balance in activities to support mood.    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 Recognizing opportunity for growth/new challenges. Focus on boundaries, self care.. Taking advantage of Saturdays off from work. Managing contact with stepdad. Continuing desire to focus on positive-productive relationships/activities that support wellness, learning to prioritize personal needs.       Treatment  Objective(s) Addressed in This Session:   Use boundaries and proactive communication in relationships.   Improve quantity and quality of night time sleep / decrease daytime naps   Identify negative self-talk and behaviors: challenge core beliefs, myths, and actions    Interventions:  Supportive Therapy: Provided active listening and validation. Supported continued use of boundaries and limits and reflection of personal and professional priorities.     Motivational Interviewing  Target Behavior: continue using and augmenting  boundaries, proactive communication skills to get needs met, continue no contact with mom, focusing on healthy and supportive relationships.     Stage of Change: MAINTENANCE (Working to maintain change, with risk of relapse)     MI Intervention: Expressed Empathy/Understanding, Supported Autonomy, Collaboration, Evocation, Reflections: simple and complex and Change talk (evoked)     Change Talk Expressed by the Patient: Ability to change Reasons to change Need to change Committment to change Activation Taking steps    Provider Response to Change Talk: E - Evoked more info from patient about behavior change, A - Affirmed patient's thoughts, decisions, or attempts at behavior change, R - Reflected patient's change talk and S - Summarized patient's change talk statements     Assessments completed prior to visit: 2/15/21 DA  The following assessments were completed by patient for this visit:      PHQ9:       11/14/2022     8:38 AM 11/28/2022     8:13 AM 1/2/2023     2:35 PM 2/6/2023     9:40 AM 2/27/2023     9:47 AM 3/13/2023     8:50 AM 5/22/2023     9:33 AM   PHQ-9 SCORE   PHQ-9 Total Score MyChart 7 (Mild depression) 8 (Mild depression) 8 (Mild depression) 7 (Mild depression) 5 (Mild depression) 6 (Mild depression) 7 (Mild depression)   PHQ-9 Total Score 7 8 8 7 5 6 7     GAD7:       5/30/2022     9:40 PM 8/16/2022     7:04 AM 9/12/2022     7:41 AM 10/25/2022     3:57 AM 11/14/2022     8:40 AM  11/28/2022     8:14 AM 5/22/2023     9:34 AM   AYDIN-7 SCORE   Total Score 9 (mild anxiety) 12 (moderate anxiety) 9 (mild anxiety) 7 (mild anxiety) 7 (mild anxiety) 11 (moderate anxiety) 10 (moderate anxiety)   Total Score 9 12 9 7 7 11 10     PROMIS 10-Global Health (only subscores and total score):       3/28/2022     9:17 AM 6/29/2022     4:37 AM 10/3/2022     7:49 AM 10/25/2022     3:59 AM 2/6/2023     9:41 AM 2/20/2023     9:13 AM 5/22/2023     9:35 AM   PROMIS-10 Scores Only   Global Mental Health Score 9 12 12 13 13 13 13   Global Physical Health Score 15 17 18 17 18 17 16   PROMIS TOTAL - SUBSCORES 24 29 30 30 31 30 29         ASSESSMENT: Current Emotional / Mental Status (status of significant symptoms):   Risk status (Self / Other harm or suicidal ideation)   Patient denies current fears or concerns for personal safety.   Patient denies current or recent suicidal ideation or behaviors.   Patient denies current or recent homicidal ideation or behaviors.   Patient denies current or recent self injurious behavior or ideation.   Patient denies other safety concerns.   Patient reports there has been no change in risk factors since their last session.     Patient reports there has been no change in protective factors since their last session.     Recommended that patient call 911 or go to the local ED should there be a change in any of these risk factors.     Appearance:   Appropriate    Eye Contact:   Good    Psychomotor Behavior: Normal    Attitude:   Cooperative  Friendly   Orientation:   All   Speech    Rate / Production: Normal     Volume:  Normal    Mood:    Anxious  Normal,   Affect:    Appropriate    Thought Content:  Clear    Thought Form:  Coherent  Goal Directed  Logical    Insight:    Good      Medication Review:   No changes to current psychiatric medication(s)     Medication Compliance:   Yes     Changes in Health Issues:   None noted       Chemical Use Review:   Substance Use: Chemical use reviewed,  no active concerns identified      Tobacco Use: No current tobacco use.      Diagnosis:  1. Major depressive disorder, recurrent episode, mild with anxious distress (H)        Collateral Reports Completed:   Not Applicable    PLAN: (Patient Tasks / Therapist Tasks / Other)  Continue using PATRIZIA, FAST and Check the Facts engage intentionally with supportive people, prioritize family/friend  time. Prioritize down time/quiet time, use podcasts, yoga, meditation, solo time activities.  Continue no communication with mother, boundary with stepdad.       DEE Walden [facility] 5/22/23      _____________________________________________________________  Individual Treatment Plan    Patient's Name: Joey Spears  YOB: 1995    Date of Creation:   9/13/2021  Date Treatment Plan Last Reviewed/Revised: 2/6/23  There has been demonstrated improvement in functioning while patient has been engaged in psychotherapy/psychological service- if withdrawn the patient would deteriorate and/or relapse.     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    Anticipated number of session for this episode of care: 20-30  Anticipation frequency of session: Every other week  Anticipated Duration of each session: 38-52 minutes  Treatment plan will be reviewed in 90 days or when goals have been changed.     MeasurableTreatment Goal(s) related to diagnosis / functional impairment(s)  Goal 1: Client will experience an improvement in mood and anxiety evidenced by self report and AYDIN/PHQ scores of 5 or less    I will know I've met my goal when I'm enjoying my life more (paraphrase) .       Objective #A (Client Action)    Client will Increase interest, engagement, and pleasure in doing things  Decrease frequency and intensity of feeling down, depressed, hopeless  Improve quantity and quality of night time sleep / decrease daytime naps  Feel less tired and more energy during the day   Identify negative self-talk and behaviors: challenge core beliefs, myths, and actions  Improve concentration, focus, and mindfulness in daily activities   Feel less fidgety, restless or slow in daily activities / interpersonal interactions  Use boundaries and proactive communication in relationships.  Status:    2/6/23  Intervention(s)  Therapist will teach behavioral activation, sleep hygiene, CBT, DBT and ACT skills, proactive communication, mindfulness, grounding skills to support mood improvement and anxiety reduction.    Patient has reviewed and agreed to the above plan.      DEE Walden,  [facility] 2/6/23    Answers for HPI/ROS submitted by the patient on 8/16/2022  If you checked off any problems, how difficult have these problems made it for you to do your work, take care of things at home, or get along with other people?: Somewhat difficult  PHQ9 TOTAL SCORE: 8  AYDIN 7 TOTAL SCORE: 12

## 2023-06-05 ENCOUNTER — VIRTUAL VISIT (OUTPATIENT)
Dept: PSYCHOLOGY | Facility: CLINIC | Age: 28
End: 2023-06-05
Payer: COMMERCIAL

## 2023-06-05 DIAGNOSIS — F33.0 MAJOR DEPRESSIVE DISORDER, RECURRENT EPISODE, MILD WITH ANXIOUS DISTRESS (H): Primary | ICD-10-CM

## 2023-06-05 PROCEDURE — 90834 PSYTX W PT 45 MINUTES: CPT | Mod: VID

## 2023-06-05 ASSESSMENT — ANXIETY QUESTIONNAIRES
3. WORRYING TOO MUCH ABOUT DIFFERENT THINGS: SEVERAL DAYS
5. BEING SO RESTLESS THAT IT IS HARD TO SIT STILL: NOT AT ALL
GAD7 TOTAL SCORE: 8
7. FEELING AFRAID AS IF SOMETHING AWFUL MIGHT HAPPEN: SEVERAL DAYS
GAD7 TOTAL SCORE: 8
IF YOU CHECKED OFF ANY PROBLEMS ON THIS QUESTIONNAIRE, HOW DIFFICULT HAVE THESE PROBLEMS MADE IT FOR YOU TO DO YOUR WORK, TAKE CARE OF THINGS AT HOME, OR GET ALONG WITH OTHER PEOPLE: SOMEWHAT DIFFICULT
8. IF YOU CHECKED OFF ANY PROBLEMS, HOW DIFFICULT HAVE THESE MADE IT FOR YOU TO DO YOUR WORK, TAKE CARE OF THINGS AT HOME, OR GET ALONG WITH OTHER PEOPLE?: SOMEWHAT DIFFICULT
2. NOT BEING ABLE TO STOP OR CONTROL WORRYING: SEVERAL DAYS
1. FEELING NERVOUS, ANXIOUS, OR ON EDGE: MORE THAN HALF THE DAYS
4. TROUBLE RELAXING: SEVERAL DAYS
6. BECOMING EASILY ANNOYED OR IRRITABLE: MORE THAN HALF THE DAYS
GAD7 TOTAL SCORE: 8
7. FEELING AFRAID AS IF SOMETHING AWFUL MIGHT HAPPEN: SEVERAL DAYS

## 2023-06-05 NOTE — PROGRESS NOTES
[facility]                                     Progress Note    Patient Name: Joey Spears  Date:   6/5/2023        Service Type: Individual      Session Start Time: 9:00 am  Session End Time: 9:45 am     Session Length: 45 min    Session #: 41    Attendees: Client attended alone    Service Modality:    Telemedicine Visit: The patient's condition can be safely assessed and treated via synchronous audio and visual telemedicine encounter.      Reason for Telemedicine Visit: Patient has requested telehealth visit    Originating Site (Patient Location): Patient's home    Distant Site (Provider Location): Provider Remote Setting- Home Office    Consent:  The patient/guardian has verbally consented to: the potential risks and benefits of telemedicine (video visit) versus in person care; bill my insurance or make self-payment for services provided; and responsibility for payment of non-covered services.     Mode of Communication:  Video Conference via [employer]    As the provider I attest to compliance with applicable laws and regulations related to telemedicine.    DATA  Interactive Complexity: No  Crisis: No      Progress Since Last Session (Related to Symptoms / Goals / Homework):  Symptoms: 'improving Focusing on finding balance in activities to support mood.    Homework: Achieved / completed to satisfaction      Episode of Care Goals: Satisfactory progress - ACTION (Actively working towards change); Intervened by reinforcing change plan / affirming steps taken     Current / Ongoing Stressors and Concerns:  Distress regarding close friend's behaviors/alchol abuse, considering options for boundaries.   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 Recognizing opportunity for growth/new challenges. Focus on boundaries, self care.. Taking advantage of Saturdays off from work. Managing contact with stepdad. Continuing desire to focus on positive-productive  relationships/activities that support wellness, learning to prioritize personal needs.       Treatment Objective(s) Addressed in This Session:   Use boundaries and proactive communication in relationships.   Improve quantity and quality of night time sleep / decrease daytime naps   Identify negative self-talk and behaviors: challenge core beliefs, myths, and actions    Interventions:  Supportive Therapy: Provided active listening and validation. Supported continued use of boundaries and limits and reflection of personal and professional priorities to prioritize personal health.     Motivational Interviewing  Target Behavior: continue using and augmenting  boundaries, proactive communication skills to get needs met, continue no contact with mom, focusing on healthy and supportive relationships.     Stage of Change: ACTION (Actively working towards change)     MI Intervention: Expressed Empathy/Understanding, Supported Autonomy, Collaboration, Evocation, Reflections: simple and complex and Change talk (evoked)     Change Talk Expressed by the Patient: Ability to change Reasons to change Need to change Committment to change Activation Taking steps    Provider Response to Change Talk: E - Evoked more info from patient about behavior change, A - Affirmed patient's thoughts, decisions, or attempts at behavior change, R - Reflected patient's change talk and S - Summarized patient's change talk statements     Assessments completed prior to visit: 2/15/21 DA  The following assessments were completed by patient for this visit:      PHQ9:       11/28/2022     8:13 AM 1/2/2023     2:35 PM 2/6/2023     9:40 AM 2/27/2023     9:47 AM 3/13/2023     8:50 AM 5/22/2023     9:33 AM 6/5/2023     8:29 AM   PHQ-9 SCORE   PHQ-9 Total Score MyChart 8 (Mild depression) 8 (Mild depression) 7 (Mild depression) 5 (Mild depression) 6 (Mild depression) 7 (Mild depression) 6 (Mild depression)   PHQ-9 Total Score 8 8 7 5 6 7 6     GAD7:        8/16/2022     7:04 AM 9/12/2022     7:41 AM 10/25/2022     3:57 AM 11/14/2022     8:40 AM 11/28/2022     8:14 AM 5/22/2023     9:34 AM 6/5/2023     8:30 AM   AYDIN-7 SCORE   Total Score 12 (moderate anxiety) 9 (mild anxiety) 7 (mild anxiety) 7 (mild anxiety) 11 (moderate anxiety) 10 (moderate anxiety) 8 (mild anxiety)   Total Score 12 9 7 7 11 10 8     PROMIS 10-Global Health (only subscores and total score):       6/29/2022     4:37 AM 10/3/2022     7:49 AM 10/25/2022     3:59 AM 2/6/2023     9:41 AM 2/20/2023     9:13 AM 5/22/2023     9:35 AM 6/5/2023     8:31 AM   PROMIS-10 Scores Only   Global Mental Health Score 12 12 13 13 13 13 13   Global Physical Health Score 17 18 17 18 17 16 17   PROMIS TOTAL - SUBSCORES 29 30 30 31 30 29 30         ASSESSMENT: Current Emotional / Mental Status (status of significant symptoms):   Risk status (Self / Other harm or suicidal ideation)   Patient denies current fears or concerns for personal safety.   Patient denies current or recent suicidal ideation or behaviors.   Patient denies current or recent homicidal ideation or behaviors.   Patient denies current or recent self injurious behavior or ideation.   Patient denies other safety concerns.   Patient reports there has been no change in risk factors since their last session.     Patient reports there has been no change in protective factors since their last session.     Recommended that patient call 911 or go to the local ED should there be a change in any of these risk factors.     Appearance:   Appropriate    Eye Contact:   Good    Psychomotor Behavior: Normal    Attitude:   Cooperative  Friendly   Orientation:   All   Speech    Rate / Production: Normal     Volume:  Normal    Mood:    Anxious  Normal,   Affect:    Appropriate    Thought Content:  Clear    Thought Form:  Coherent  Goal Directed  Logical    Insight:    Good      Medication Review:   No changes to current psychiatric medication(s)     Medication  Compliance:   Yes     Changes in Health Issues:   None noted       Chemical Use Review:   Substance Use: Chemical use reviewed, no active concerns identified      Tobacco Use: No current tobacco use.      Diagnosis:  1. Major depressive disorder, recurrent episode, mild with anxious distress (H)        Collateral Reports Completed:   Not Applicable    PLAN: (Patient Tasks / Therapist Tasks / Other)  Continue using PATRIZIA, FAST and Check the Facts engage intentionally with supportive people, prioritize family/friend  time. Prioritize down time/quiet time, use podcasts, yoga, meditation, solo time activities.  Continue no communication with mother, boundary with stepdad.       DEE Walden [facility] 6/5/2023      _____________________________________________________________  Individual Treatment Plan    Patient's Name: Joey Spears  YOB: 1995    Date of Creation:   9/13/2021  Date Treatment Plan Last Reviewed/Revised: 6/5/2023    There has been demonstrated improvement in functioning while patient has been engaged in psychotherapy/psychological service- if withdrawn the patient would deteriorate and/or relapse.     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    Anticipated number of session for this episode of care: 20-30  Anticipation frequency of session: Every other week  Anticipated Duration of each session: 38-52 minutes  Treatment plan will be reviewed in 90 days or when goals have been changed.     MeasurableTreatment Goal(s) related to diagnosis / functional impairment(s)  Goal 1: Client will experience an improvement in mood and anxiety  evidenced by self report and AYDIN/PHQ scores of 5 or less    I will know I've met my goal when I'm enjoying my life more (paraphrase) .      Objective #A (Client Action)    Client will Increase interest, engagement, and pleasure in doing things  Decrease frequency and intensity of feeling down, depressed, hopeless  Improve quantity and quality of night time sleep / decrease daytime naps  Feel less tired and more energy during the day   Identify negative self-talk and behaviors: challenge core beliefs, myths, and actions  Improve concentration, focus, and mindfulness in daily activities   Feel less fidgety, restless or slow in daily activities / interpersonal interactions  Use boundaries and proactive communication in relationships.  Status:    6/5/2023    Intervention(s)  Therapist will teach behavioral activation, sleep hygiene, CBT, DBT and ACT skills, proactive communication, mindfulness, grounding skills to support mood improvement and anxiety reduction.    Patient has reviewed and agreed to the above plan.      DEE Walden,  LICSW 6/5/2023      Answers for HPI/ROS submitted by the patient on 8/16/2022  If you checked off any problems, how difficult have these problems made it for you to do your work, take care of things at home, or get along with other people?: Somewhat difficult  PHQ9 TOTAL SCORE: 8  AYDIN 7 TOTAL SCORE: 12

## 2023-06-19 ENCOUNTER — VIRTUAL VISIT (OUTPATIENT)
Dept: PSYCHOLOGY | Facility: CLINIC | Age: 28
End: 2023-06-19
Payer: COMMERCIAL

## 2023-06-19 DIAGNOSIS — F33.0 MAJOR DEPRESSIVE DISORDER, RECURRENT EPISODE, MILD WITH ANXIOUS DISTRESS (H): Primary | ICD-10-CM

## 2023-06-19 PROCEDURE — 90834 PSYTX W PT 45 MINUTES: CPT | Mod: VID

## 2023-06-19 NOTE — PROGRESS NOTES
[facility]                                     Progress Note    Patient Name: Joey Spears  Date:   6/19/2023        Service Type: Individual      Session Start Time: 9:00 am  Session End Time: 9:45 am     Session Length: 45 min    Session #: 42    Attendees: Client attended alone    Service Modality:    Telemedicine Visit: The patient's condition can be safely assessed and treated via synchronous audio and visual telemedicine encounter.      Reason for Telemedicine Visit: Patient has requested telehealth visit    Originating Site (Patient Location): Patient's home    Distant Site (Provider Location): Provider Remote Setting- Home Office    Consent:  The patient/guardian has verbally consented to: the potential risks and benefits of telemedicine (video visit) versus in person care; bill my insurance or make self-payment for services provided; and responsibility for payment of non-covered services.     Mode of Communication:  Video Conference via [place]    As the provider I attest to compliance with applicable laws and regulations related to telemedicine.    DATA  Interactive Complexity: No  Crisis: No      Progress Since Last Session (Related to Symptoms / Goals / Homework):  Symptoms: 'improving Focusing on finding balance in activities to support mood.    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 Recognizing opportunity for growth/new challenges. Focus on boundaries, self care. Taking advantage of Saturdays off from work-considering eliminating Saturdays  Ongoing No contact with mom, managing contact with stepdad. Continuing desire to focus on positive-productive relationships/activities that support  wellness, learning to prioritize personal needs.       Treatment Objective(s) Addressed in This Session:   Use boundaries and proactive communication in relationships.   Improve quantity and quality of night time sleep / decrease daytime naps   Identify negative self-talk and behaviors: challenge core beliefs, myths, and actions    Interventions:  Supportive Therapy: Provided active listening and validation. Supported continued use of boundaries and limits and reflection of personal and professional priorities to prioritize personal health.     Motivational Interviewing  Target Behavior: continue using and augmenting  boundaries, proactive communication skills to get needs met, continue no contact with mom, focusing on healthy and supportive relationships. Engage in intentional prioritization of activities    Stage of Change: MAINTENANCE (Working to maintain change, with risk of relapse)     MI Intervention: Expressed Empathy/Understanding, Supported Autonomy, Collaboration, Evocation, Open-ended questions, Reflections: simple and complex and Change talk (evoked)     Change Talk Expressed by the Patient: Ability to change Reasons to change Need to change Committment to change Activation Taking steps    Provider Response to Change Talk: E - Evoked more info from patient about behavior change, A - Affirmed patient's thoughts, decisions, or attempts at behavior change, R - Reflected patient's change talk and S - Summarized patient's change talk statements     Assessments completed prior to visit: 2/15/21 DA  The following assessments were completed by patient for this visit:      PHQ9:       11/28/2022     8:13 AM 1/2/2023     2:35 PM 2/6/2023     9:40 AM 2/27/2023     9:47 AM 3/13/2023     8:50 AM 5/22/2023     9:33 AM 6/5/2023     8:29 AM   PHQ-9 SCORE   PHQ-9 Total Score Joe 8 (Mild depression) 8 (Mild depression) 7 (Mild depression) 5 (Mild depression) 6 (Mild depression) 7 (Mild depression) 6 (Mild depression)    PHQ-9 Total Score 8 8 7 5 6 7 6     GAD7:       8/16/2022     7:04 AM 9/12/2022     7:41 AM 10/25/2022     3:57 AM 11/14/2022     8:40 AM 11/28/2022     8:14 AM 5/22/2023     9:34 AM 6/5/2023     8:30 AM   AYDIN-7 SCORE   Total Score 12 (moderate anxiety) 9 (mild anxiety) 7 (mild anxiety) 7 (mild anxiety) 11 (moderate anxiety) 10 (moderate anxiety) 8 (mild anxiety)   Total Score 12 9 7 7 11 10 8     PROMIS 10-Global Health (only subscores and total score):       6/29/2022     4:37 AM 10/3/2022     7:49 AM 10/25/2022     3:59 AM 2/6/2023     9:41 AM 2/20/2023     9:13 AM 5/22/2023     9:35 AM 6/5/2023     8:31 AM   PROMIS-10 Scores Only   Global Mental Health Score 12 12 13 13 13 13 13   Global Physical Health Score 17 18 17 18 17 16 17   PROMIS TOTAL - SUBSCORES 29 30 30 31 30 29 30         ASSESSMENT: Current Emotional / Mental Status (status of significant symptoms):   Risk status (Self / Other harm or suicidal ideation)   Patient denies current fears or concerns for personal safety.   Patient denies current or recent suicidal ideation or behaviors.   Patient denies current or recent homicidal ideation or behaviors.   Patient denies current or recent self injurious behavior or ideation.   Patient denies other safety concerns.   Patient reports there has been no change in risk factors since their last session.     Patient reports there has been no change in protective factors since their last session.     Recommended that patient call 911 or go to the local ED should there be a change in any of these risk factors.     Appearance:   Appropriate    Eye Contact:   Good    Psychomotor Behavior: Normal    Attitude:   Cooperative  Friendly   Orientation:   All   Speech    Rate / Production: Normal     Volume:  Normal    Mood:    Anxious  Normal,   Affect:    Appropriate    Thought Content:  Clear    Thought Form:  Coherent  Goal Directed  Logical    Insight:    Good      Medication Review:   No changes to current  psychiatric medication(s)     Medication Compliance:   Yes     Changes in Health Issues:   None noted       Chemical Use Review:   Substance Use: Chemical use reviewed, no active concerns identified      Tobacco Use: No current tobacco use.      Diagnosis:  1. Major depressive disorder, recurrent episode, mild with anxious distress (H)        Collateral Reports Completed:   Not Applicable    PLAN: (Patient Tasks / Therapist Tasks / Other)  Continue using PATRIZIA, FAST and Check the Facts engage intentionally with supportive people, prioritize family/friend  time. Prioritize down time/quiet time, use podcasts, yoga, meditation, solo time activities.  Continue no communication with mother, boundary with stepdad.       DEE Walden [facility] 6/19/2023      _____________________________________________________________  Individual Treatment Plan    Patient's Name: Joey Spears  YOB: 1995    Date of Creation:   9/13/2021  Date Treatment Plan Last Reviewed/Revised: 6/5/2023    There has been demonstrated improvement in functioning while patient has been engaged in psychotherapy/psychological service- if withdrawn the patient would deteriorate and/or relapse.     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    Anticipated number of session for this episode of care: 20-30  Anticipation frequency of session: Every other week  Anticipated Duration of each session: 38-52 minutes  Treatment plan will be reviewed in 90 days or when goals have been changed.     MeasurableTreatment Goal(s) related to diagnosis / functional impairment(s)  Goal 1: Client will  experience an improvement in mood and anxiety evidenced by self report and AYDIN/PHQ scores of 5 or less    I will know I've met my goal when I'm enjoying my life more (paraphrase) .      Objective #A (Client Action)    Client will Increase interest, engagement, and pleasure in doing things  Decrease frequency and intensity of feeling down, depressed, hopeless  Improve quantity and quality of night time sleep / decrease daytime naps  Feel less tired and more energy during the day   Identify negative self-talk and behaviors: challenge core beliefs, myths, and actions  Improve concentration, focus, and mindfulness in daily activities   Feel less fidgety, restless or slow in daily activities / interpersonal interactions  Use boundaries and proactive communication in relationships.  Status:    6/5/2023    Intervention(s)  Therapist will teach behavioral activation, sleep hygiene, CBT, DBT and ACT skills, proactive communication, mindfulness, grounding skills to support mood improvement and anxiety reduction.    Patient has reviewed and agreed to the above plan.      DEE Walden,  [facility] 6/5/2023      Answers for HPI/ROS submitted by the patient on 8/16/2022  If you checked off any problems, how difficult have these problems made it for you to do your work, take care of things at home, or get along with other people?: Somewhat difficult  PHQ9 TOTAL SCORE: 8  AYDIN 7 TOTAL SCORE: 12

## 2023-07-12 ENCOUNTER — OFFICE VISIT (OUTPATIENT)
Dept: URGENT CARE | Facility: URGENT CARE | Age: 28
End: 2023-07-12
Payer: COMMERCIAL

## 2023-07-12 ENCOUNTER — PATIENT OUTREACH (OUTPATIENT)
Dept: ONCOLOGY | Facility: CLINIC | Age: 28
End: 2023-07-12

## 2023-07-12 VITALS
OXYGEN SATURATION: 98 % | WEIGHT: 153 LBS | TEMPERATURE: 101.6 F | DIASTOLIC BLOOD PRESSURE: 56 MMHG | BODY MASS INDEX: 26.68 KG/M2 | RESPIRATION RATE: 20 BRPM | SYSTOLIC BLOOD PRESSURE: 104 MMHG | HEART RATE: 88 BPM

## 2023-07-12 DIAGNOSIS — N64.4 BREAST PAIN, LEFT: ICD-10-CM

## 2023-07-12 DIAGNOSIS — N61.0 INFECTION OF LEFT BREAST: Primary | ICD-10-CM

## 2023-07-12 LAB
BASOPHILS # BLD AUTO: 0 10E3/UL (ref 0–0.2)
BASOPHILS NFR BLD AUTO: 0 %
EOSINOPHIL # BLD AUTO: 0 10E3/UL (ref 0–0.7)
EOSINOPHIL NFR BLD AUTO: 0 %
ERYTHROCYTE [DISTWIDTH] IN BLOOD BY AUTOMATED COUNT: 12.4 % (ref 10–15)
HCT VFR BLD AUTO: 40.2 % (ref 35–47)
HGB BLD-MCNC: 13 G/DL (ref 11.7–15.7)
IMM GRANULOCYTES # BLD: 0.1 10E3/UL
IMM GRANULOCYTES NFR BLD: 1 %
LYMPHOCYTES # BLD AUTO: 0.4 10E3/UL (ref 0.8–5.3)
LYMPHOCYTES NFR BLD AUTO: 3 %
MCH RBC QN AUTO: 31.1 PG (ref 26.5–33)
MCHC RBC AUTO-ENTMCNC: 32.3 G/DL (ref 31.5–36.5)
MCV RBC AUTO: 96 FL (ref 78–100)
MONOCYTES # BLD AUTO: 0.6 10E3/UL (ref 0–1.3)
MONOCYTES NFR BLD AUTO: 3 %
NEUTROPHILS # BLD AUTO: 16.1 10E3/UL (ref 1.6–8.3)
NEUTROPHILS NFR BLD AUTO: 93 %
NRBC # BLD AUTO: 0 10E3/UL
NRBC BLD AUTO-RTO: 0 /100
PLATELET # BLD AUTO: 285 10E3/UL (ref 150–450)
RBC # BLD AUTO: 4.18 10E6/UL (ref 3.8–5.2)
WBC # BLD AUTO: 17.3 10E3/UL (ref 4–11)

## 2023-07-12 PROCEDURE — 99214 OFFICE O/P EST MOD 30 MIN: CPT | Mod: 25 | Performed by: NURSE PRACTITIONER

## 2023-07-12 PROCEDURE — 87040 BLOOD CULTURE FOR BACTERIA: CPT | Performed by: NURSE PRACTITIONER

## 2023-07-12 PROCEDURE — 36415 COLL VENOUS BLD VENIPUNCTURE: CPT | Performed by: NURSE PRACTITIONER

## 2023-07-12 PROCEDURE — 85025 COMPLETE CBC W/AUTO DIFF WBC: CPT | Performed by: NURSE PRACTITIONER

## 2023-07-12 PROCEDURE — 96372 THER/PROPH/DIAG INJ SC/IM: CPT | Performed by: NURSE PRACTITIONER

## 2023-07-12 RX ORDER — DOXYCYCLINE 100 MG/1
100 CAPSULE ORAL 2 TIMES DAILY
Qty: 20 CAPSULE | Refills: 0 | Status: SHIPPED | OUTPATIENT
Start: 2023-07-12 | End: 2023-07-22

## 2023-07-12 RX ORDER — CEFTRIAXONE SODIUM 1 G
1 VIAL (EA) INJECTION ONCE
Status: COMPLETED | OUTPATIENT
Start: 2023-07-12 | End: 2023-07-12

## 2023-07-12 RX ADMIN — Medication 1 G: at 11:28

## 2023-07-12 NOTE — PROGRESS NOTES
Chief Complaint   Patient presents with     Breast Pain     Today, left breast, tender to touch, chills, numbness in both hand and feet, vomited once     SUBJECTIVE:  Joey Spears is a 28 year old female who presents to the clinic today with 1 day of severe left breast axilla tenderness redness warmth firm sensation chills fever nausea vomiting x1 numbness dizziness.  She has nipple piercings and has had issues with local infections over the years.  She is not pregnant or breast-feeding.  There is no fluctuant purulent abscess visualized, but deeper firm tender erythematous areas.    Past Medical History:   Diagnosis Date     Depressive disorder 2008    Previously medicated     buPROPion (WELLBUTRIN XL) 300 MG 24 hr tablet, TAKE 1 TABLET(300 MG) BY MOUTH EVERY MORNING  busPIRone (BUSPAR) 10 MG tablet, Take 1 tablet (10 mg) by mouth 2 times daily  paragard intrauterine copper device, 1 Device by Intrauterine route    No current facility-administered medications on file prior to visit.    Social History     Tobacco Use     Smoking status: Former     Packs/day: 0.50     Years: 10.00     Pack years: 5.00     Types: Cigarettes, Other     Quit date: 4/1/2020     Years since quitting: 3.2     Smokeless tobacco: Current     Tobacco comments:     Quit cigarettes- currently using vape   Substance Use Topics     Alcohol use: Yes     No Known Allergies    Review of Systems   All systems negative except for those listed above in HPI.    EXAM:   /56   Pulse 88   Temp (!) 101.6  F (38.7  C)   Resp 20   Wt 69.4 kg (153 lb)   SpO2 98%   BMI 26.68 kg/m      Physical Exam  Vitals reviewed.   Constitutional:       General: She is not in acute distress.     Appearance: Normal appearance. She is not ill-appearing, toxic-appearing or diaphoretic.   HENT:      Head: Normocephalic and atraumatic.   Cardiovascular:      Rate and Rhythm: Normal rate.      Pulses: Normal pulses.   Pulmonary:      Effort: Pulmonary effort  is normal.   Chest:          Comments: Tender in red areas, subtle erythema and yellow crust around nipple piercing.  Musculoskeletal:         General: Swelling and tenderness present. Normal range of motion.   Skin:     General: Skin is warm and dry.      Findings: Erythema present. No rash.   Neurological:      General: No focal deficit present.      Mental Status: She is alert and oriented to person, place, and time.      Sensory: Sensory deficit (tingling dizzy from the pain) present.   Psychiatric:         Mood and Affect: Mood normal.         Behavior: Behavior normal.       CBC with WBC 17.07 neutrophils 16.04 and 94%.    ASSESSMENT:    ICD-10-CM    1. Infection of left breast  N61.0 CBC with platelets and differential     Blood Culture Peripheral Blood     cefTRIAXone (ROCEPHIN) in lidocaine 1% (PF) for IM administration 1 g     doxycycline hyclate (VIBRAMYCIN) 100 MG capsule     US Breast Left Limited 1-3 Quadrants     CBC with platelets and differential     Blood Culture Peripheral Blood     Breast Provider  Referral      2. Breast pain, left  N64.4 MA Diagnostic Digital Left     Breast Provider  Referral        PLAN:    Rocephin and doxycycline for left breast infection  CBC and blood culture pending, will call for anything critical  The earliest breast imaging ultrasound diagnostic mammo is on Friday   Breast center referral placed for follow-up regarding this  Discussed plan of care with [facility], they advised outpatient antibiotic trial for 48 hours and okay to wait for imaging  Please present to the ER in 2 days if worsening fever vomiting pain feeling faint no improvement     Follow up with primary care provider with any problems, questions or concerns or if symptoms worsen or fail to improve. Patient agreed to plan and verbalized understanding.    Petty Huitron, RICHI-BC  [facility]

## 2023-07-12 NOTE — PATIENT INSTRUCTIONS
Rocephin and doxycycline for left breast infection  CBC and blood culture pending, will call for anything critical  The earliest breast imaging ultrasound diagnostic mammo is on Friday   Breast center referral placed for follow-up regarding this  Discussed plan of care with [facility], they advised outpatient antibiotic trial for 48 hours and okay to wait for imaging  Please present to the ER in 2 days if worsening fever vomiting pain feeling faint no improvement

## 2023-07-12 NOTE — PROGRESS NOTES
New Patient Oncology Nurse Navigator Note     Referring provider: Petty Huitron NP     Referring Clinic/Organization: [facility] [place]     Referred to (specialty:) Breast Provider Referral      Date Referral Received: July 12, 2023     Evaluation for:    N64.4 (ICD-10-CM) - Breast pain, left  N61.0 (ICD-10-CM) - Infection of left breast     Clinical History (per Nurse review of records provided):       Patient presented to urgent care on 7/12 with complaints of 1 day of severe left breast axilla tenderness redness warmth firm sensation chills fever, nausea and vomiting.  She has nipple piercings and has had issues with local infections over the years.   Referral states patient has fever, vomiting, and deep pain.     She is not pregnant or breast-feeding.     There is no fluctuant purulent abscess visualized, but deeper firm tender erythematous areas.  Rocephin and doxycycline for left breast infection prescribed 7/12.  CBC and blood culture pending.  Diagnostic breast imaging is scheduled for 7/14/23.  Patient's home address is closest to [facility].     7/14/23 -   Diagnostic imaging followed on 7/14/23.  No mammographic findings suspicious for malignancy. Targeted ultrasound evaluation of the left breast and axilla at the sites of concern demonstrate mild to moderate parenchymal edema along with thickened left axillary lymph nodes, consistent with infection.  No underlying fluid collections suggestive of abscess are appreciated.  IMPRESSION: BI-RADS CATEGORY: 2 - Benign Finding(s).  RECOMMENDED FOLLOW-UP: Clinical Follow-up.  Clinical follow-up is recommended, with management dependent on the results/findings of the physical examination.    Records Location: See Bookmarked material      7/13 8:58 - Telephoned and spoke with patient. She has had a little less pain since starting antibiotic, but she is still using antipyretics to keep fever down.   7/12 - WBC 17.3 AN 16.1, AL 0.4  7/12 -  Blood cultures no growth after 12 hours  Writer received referral, reviewed for appropriate plan, and call transferred to New Patient Scheduling for completion.    7/17 - Noting patient has cancelled her Breast Provider consult with Marisela Bianchi scheduled for today, referral message sent to ordering provider to learn their recommendation.

## 2023-07-14 ENCOUNTER — HOSPITAL ENCOUNTER (OUTPATIENT)
Dept: ULTRASOUND IMAGING | Facility: CLINIC | Age: 28
Discharge: HOME | End: 2023-07-14
Attending: NURSE PRACTITIONER
Payer: COMMERCIAL

## 2023-07-14 ENCOUNTER — HOSPITAL ENCOUNTER (OUTPATIENT)
Dept: MAMMOGRAPHY | Facility: CLINIC | Age: 28
Discharge: HOME | End: 2023-07-14
Attending: NURSE PRACTITIONER
Payer: COMMERCIAL

## 2023-07-14 DIAGNOSIS — N61.0 INFECTION OF LEFT BREAST: ICD-10-CM

## 2023-07-14 DIAGNOSIS — N64.4 BREAST PAIN, LEFT: ICD-10-CM

## 2023-07-14 PROCEDURE — 76642 ULTRASOUND BREAST LIMITED: CPT | Mod: LT

## 2023-07-14 PROCEDURE — 77062 BREAST TOMOSYNTHESIS BI: CPT

## 2023-07-14 NOTE — TELEPHONE ENCOUNTER
RECORDS STATUS - BREAST    RECORDS REQUESTED FROM: Epic   DATE REQUESTED:    NOTES DETAILS STATUS   OFFICE NOTE from referring provider Epic 7/12/23: Petty Huitron   MEDICATION LIST [facility]    LABS     ANYTHING RELATED TO DIAGNOSIS           Epic Most recent from 7/12/23   IMAGING (NEED IMAGES & REPORT)     MAMMO PACS 7/14/23   ULTRASOUND PACS 7/14/23: US Breast

## 2023-07-17 ENCOUNTER — PRE VISIT (OUTPATIENT)
Dept: ONCOLOGY | Facility: CLINIC | Age: 28
End: 2023-07-17

## 2023-07-17 LAB — BACTERIA BLD CULT: NO GROWTH

## 2023-07-24 ENCOUNTER — VIRTUAL VISIT (OUTPATIENT)
Dept: PSYCHOLOGY | Facility: CLINIC | Age: 28
End: 2023-07-24
Payer: COMMERCIAL

## 2023-07-24 DIAGNOSIS — F33.0 MAJOR DEPRESSIVE DISORDER, RECURRENT EPISODE, MILD WITH ANXIOUS DISTRESS (H): Primary | ICD-10-CM

## 2023-07-24 PROCEDURE — 90834 PSYTX W PT 45 MINUTES: CPT | Mod: GT

## 2023-07-24 ASSESSMENT — ANXIETY QUESTIONNAIRES
GAD7 TOTAL SCORE: 13
IF YOU CHECKED OFF ANY PROBLEMS ON THIS QUESTIONNAIRE, HOW DIFFICULT HAVE THESE PROBLEMS MADE IT FOR YOU TO DO YOUR WORK, TAKE CARE OF THINGS AT HOME, OR GET ALONG WITH OTHER PEOPLE: VERY DIFFICULT
6. BECOMING EASILY ANNOYED OR IRRITABLE: NEARLY EVERY DAY
2. NOT BEING ABLE TO STOP OR CONTROL WORRYING: MORE THAN HALF THE DAYS
GAD7 TOTAL SCORE: 13
5. BEING SO RESTLESS THAT IT IS HARD TO SIT STILL: SEVERAL DAYS
1. FEELING NERVOUS, ANXIOUS, OR ON EDGE: MORE THAN HALF THE DAYS
3. WORRYING TOO MUCH ABOUT DIFFERENT THINGS: MORE THAN HALF THE DAYS
4. TROUBLE RELAXING: MORE THAN HALF THE DAYS
7. FEELING AFRAID AS IF SOMETHING AWFUL MIGHT HAPPEN: SEVERAL DAYS

## 2023-07-24 ASSESSMENT — PATIENT HEALTH QUESTIONNAIRE - PHQ9
10. IF YOU CHECKED OFF ANY PROBLEMS, HOW DIFFICULT HAVE THESE PROBLEMS MADE IT FOR YOU TO DO YOUR WORK, TAKE CARE OF THINGS AT HOME, OR GET ALONG WITH OTHER PEOPLE: SOMEWHAT DIFFICULT
SUM OF ALL RESPONSES TO PHQ QUESTIONS 1-9: 11
SUM OF ALL RESPONSES TO PHQ QUESTIONS 1-9: 11

## 2023-07-24 NOTE — PROGRESS NOTES
[facility]                                     Progress Note    Patient Name: Joey Spears  Date:   7/24/2023        Service Type: Individual      Session Start Time: 9:00 am  Session End Time: 9:45 am     Session Length: 45 min    Session #: 42    Attendees: Client attended alone    Service Modality:    Telemedicine Visit: The patient's condition can be safely assessed and treated via synchronous audio and visual telemedicine encounter.      Reason for Telemedicine Visit: Patient has requested telehealth visit    Originating Site (Patient Location): Patient's home    Distant Site (Provider Location): Provider Remote Setting- Home Office    Consent:  The patient/guardian has verbally consented to: the potential risks and benefits of telemedicine (video visit) versus in person care; bill my insurance or make self-payment for services provided; and responsibility for payment of non-covered services.     Mode of Communication:  Video Conference via [employer]    As the provider I attest to compliance with applicable laws and regulations related to telemedicine.    DATA  Interactive Complexity: No  Crisis: No      Progress Since Last Session (Related to Symptoms / Goals / Homework):  Symptoms:  'improving  Focusing on finding balance in activities to support mood.    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 Focus on boundaries, self care. Taking advantage of Saturdays off from work.  Ongoing No contact with mom, managing contact with stepdad. Continuing desire to focus on positive-productive relationships/activities that support wellness,  learning to prioritize personal needs.       Treatment Objective(s) Addressed in This Session:   Use boundaries and proactive communication in relationships.    Improve quantity and quality of night time sleep / decrease daytime naps   Identify negative self-talk and behaviors: challenge core beliefs, myths, and actions    Interventions:  Supportive Therapy: Provided active listening and validation. Supported continued use of boundaries and limits and reflection of personal and professional priorities to prioritize personal health.     Motivational Interviewing  Target Behavior: continue using and augmenting   boundaries, proactive communication skills to get needs met , continue no contact with mom, focusing on healthy and supportive relationships. Engage in intentional prioritization of activities, resume workouts    Stage of Change: ACTION (Actively working towards change)     MI Intervention: Expressed Empathy/Understanding, Supported Autonomy, Collaboration, Evocation, Open-ended questions, Reflections: simple and complex, and Change talk (evoked)     Change Talk Expressed by the Patient: Ability to change Reasons to change Need to change Committment to change Activation Taking steps    Provider Response to Change Talk: E - Evoked more info from patient about behavior change, A - Affirmed patient's thoughts, decisions, or attempts at behavior change, R - Reflected patient's change talk, and S - Summarized patient's change talk statements     Assessments completed prior to visit: 2/15/21 DA  The following assessments were completed by patient for this visit:      PHQ9:       1/2/2023     2:35 PM 2/6/2023     9:40 AM 2/27/2023     9:47 AM 3/13/2023     8:50 AM 5/22/2023     9:33 AM 6/5/2023     8:29 AM 7/24/2023     8:44 AM   PHQ-9 SCORE   PHQ-9 Total Score Joe 8 (Mild depression) 7 (Mild depression) 5 (Mild depression) 6 (Mild depression) 7 (Mild depression) 6 (Mild depression) 11 (Moderate depression)    PHQ-9 Total Score 8 7 5 6 7 6 11     GAD7:       9/12/2022     7:41 AM 10/25/2022     3:57 AM 11/14/2022     8:40 AM 11/28/2022     8:14 AM 5/22/2023     9:34 AM 6/5/2023     8:30 AM 7/24/2023     8:45 AM   AYDIN-7 SCORE   Total Score 9 (mild anxiety) 7 (mild anxiety) 7 (mild anxiety) 11 (moderate anxiety) 10 (moderate anxiety) 8 (mild anxiety) 13 (moderate anxiety)   Total Score 9 7 7 11 10 8 13     PROMIS 10-Global Health (only subscores and total score):       6/29/2022     4:37 AM 10/3/2022     7:49 AM 10/25/2022     3:59 AM 2/6/2023     9:41 AM 2/20/2023     9:13 AM 5/22/2023     9:35 AM 6/5/2023     8:31 AM   PROMIS-10 Scores Only   Global Mental Health Score 12 12 13 13 13 13 13   Global Physical Health Score 17 18 17 18 17 16 17   PROMIS TOTAL - SUBSCORES 29 30 30 31 30 29 30         ASSESSMENT: Current Emotional / Mental Status (status of significant symptoms):   Risk status (Self / Other harm or suicidal ideation)   Patient denies current fears or concerns for personal safety.   Patient denies current or recent suicidal ideation or behaviors.   Patient denies current or recent homicidal ideation or behaviors.   Patient denies current or recent self injurious behavior or ideation.   Patient denies other safety concerns.   Patient reports there has been no change in risk factors since their last session.     Patient reports there has been no change in protective factors since their last session.     Recommended that patient call 911 or go to the local ED should there be a change in any of these risk factors.     Appearance:   Appropriate    Eye Contact:   Good    Psychomotor Behavior: Normal    Attitude:   Cooperative  Friendly   Orientation:   All   Speech    Rate / Production: Normal     Volume:  Normal    Mood:    Anxious  Normal,   Affect:    Appropriate    Thought Content:  Clear    Thought Form:  Coherent  Goal Directed  Logical    Insight:    Good      Medication Review:   No changes to current  psychiatric medication(s)     Medication Compliance:   Yes     Changes in Health Issues:   None noted       Chemical Use Review:   Substance Use: Chemical use reviewed, no active concerns identified      Tobacco Use: No current tobacco use.      Diagnosis:  1. Major depressive disorder, recurrent episode, mild with anxious distress (H)        Collateral Reports Completed:   Not Applicable    PLAN: (Patient Tasks / Therapist Tasks / Other)  Continue using PATRIZIA, FAST and Check the Facts engage intentionally with supportive people, prioritize family/friend  time. Prioritize down time/quiet time, use podcasts, yoga, meditation, solo time activities.  Continue no communication with mother, boundary with stepdad.   Pursue new job options.    DEE Walden [facility] 7/24/2023      _____________________________________________________________  Individual Treatment Plan    Patient's Name: Jeoy Spears  YOB: 1995    Date of Creation:   9/13/2021  Date Treatment Plan Last Reviewed/Revised: 6/5/2023    There has been demonstrated improvement in functioning while patient has been engaged in psychotherapy/psychological service- if withdrawn the patient would deteriorate and/or relapse.     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    Anticipated number of session for this episode of care: 20-30  Anticipation frequency of session: Every other week  Anticipated Duration of each session: 38-52 minutes  Treatment plan will be reviewed in 90 days or when goals have been changed.     MeasurableTreatment Goal(s) related to diagnosis / functional  impairment(s)  Goal 1: Client will experience an improvement in mood and anxiety evidenced by self report and AYDIN/PHQ scores of 5 or less    I will know I've met my goal when I'm enjoying my life more (paraphrase) .      Objective #A (Client Action)    Client will Increase interest, engagement, and pleasure in doing things  Decrease frequency and intensity of feeling down, depressed, hopeless  Improve quantity and quality of night time sleep / decrease daytime naps  Feel less tired and more energy during the day   Identify negative self-talk and behaviors: challenge core beliefs, myths, and actions  Improve concentration, focus, and mindfulness in daily activities   Feel less fidgety, restless or slow in daily activities / interpersonal interactions  Use boundaries and proactive communication in relationships.  Status:    6/5/2023    Intervention(s)  Therapist will teach behavioral activation, sleep hygiene, CBT, DBT and ACT skills, proactive communication, mindfulness, grounding skills to support mood improvement and anxiety reduction.    Patient has reviewed and agreed to the above plan.      DEE Walden,  [facility] 6/5/2023      Answers for HPI/ROS submitted by the patient on 8/16/2022  If you checked off any problems, how difficult have these problems made it for you to do your work, take care of things at home, or get along with other people?: Somewhat difficult  PHQ9 TOTAL SCORE: 8  AYDIN 7 TOTAL SCORE: 12

## 2023-08-08 DIAGNOSIS — F33.1 MODERATE EPISODE OF RECURRENT MAJOR DEPRESSIVE DISORDER (H): ICD-10-CM

## 2023-08-08 DIAGNOSIS — F41.9 ANXIETY: ICD-10-CM

## 2023-08-10 NOTE — TELEPHONE ENCOUNTER
Routing refill request to provider for review/approval because:  -- Madiha given x1   -- Patient needs to be seen because it has been more than 1 year since last office visit.  -- PHQ9 due. Questionnaire done today.   -- MyChart. Message sent to schedule annual visit        6/5/2023     8:29 AM 7/24/2023     8:44 AM 8/10/2023     2:04 PM   PHQ   PHQ-9 Total Score 6 11 10   Q9: Thoughts of better off dead/self-harm past 2 weeks Not at all Not at all Not at all        SSRIs Protocol Failed      PHQ-9 score less than 5 in past 6 months    Please review last PHQ-9 score.     Recent (6 mo) or future (30 days) visit within the authorizing provider's specialty    Last Written Prescription Date:  6/20/2022  Last Fill Quantity: 90,  # refills: 3   Last office visit: 5/16/2022; last virtual visit: 8/19/2021 with prescribing provider:  Dr. Villar   Future Office Visit:  none    ELIAJH MongeN RN  [facility]

## 2023-08-11 RX ORDER — BUPROPION HYDROCHLORIDE 300 MG/1
300 TABLET ORAL EVERY MORNING
Qty: 90 TABLET | Refills: 0 | Status: SHIPPED | OUTPATIENT
Start: 2023-08-11 | End: 2024-09-03

## 2023-08-28 ENCOUNTER — VIRTUAL VISIT (OUTPATIENT)
Dept: PSYCHOLOGY | Facility: CLINIC | Age: 28
End: 2023-08-28
Payer: COMMERCIAL

## 2023-08-28 DIAGNOSIS — F33.0 MAJOR DEPRESSIVE DISORDER, RECURRENT EPISODE, MILD WITH ANXIOUS DISTRESS (H): Primary | ICD-10-CM

## 2023-08-28 PROCEDURE — 90834 PSYTX W PT 45 MINUTES: CPT | Mod: VID

## 2023-08-28 ASSESSMENT — ANXIETY QUESTIONNAIRES
6. BECOMING EASILY ANNOYED OR IRRITABLE: MORE THAN HALF THE DAYS
3. WORRYING TOO MUCH ABOUT DIFFERENT THINGS: MORE THAN HALF THE DAYS
2. NOT BEING ABLE TO STOP OR CONTROL WORRYING: NEARLY EVERY DAY
GAD7 TOTAL SCORE: 15
7. FEELING AFRAID AS IF SOMETHING AWFUL MIGHT HAPPEN: SEVERAL DAYS
5. BEING SO RESTLESS THAT IT IS HARD TO SIT STILL: MORE THAN HALF THE DAYS
4. TROUBLE RELAXING: MORE THAN HALF THE DAYS
IF YOU CHECKED OFF ANY PROBLEMS ON THIS QUESTIONNAIRE, HOW DIFFICULT HAVE THESE PROBLEMS MADE IT FOR YOU TO DO YOUR WORK, TAKE CARE OF THINGS AT HOME, OR GET ALONG WITH OTHER PEOPLE: SOMEWHAT DIFFICULT
GAD7 TOTAL SCORE: 15
1. FEELING NERVOUS, ANXIOUS, OR ON EDGE: NEARLY EVERY DAY

## 2023-08-28 NOTE — PROGRESS NOTES
[facility]                                     Progress Note    Patient Name: Joey Spears  Date:   8/28/2023        Service Type: Individual      Session Start Time: 9:00 am  Session End Time: 9:45 am     Session Length: 45 min    Session #: 43    Attendees: Client attended alone    Service Modality:    Telemedicine Visit: The patient's condition can be safely assessed and treated via synchronous audio and visual telemedicine encounter.      Reason for Telemedicine Visit: Patient has requested telehealth visit    Originating Site (Patient Location): Patient's home    Distant Site (Provider Location): Provider Remote Setting- Home Office    Consent:  The patient/guardian has verbally consented to: the potential risks and benefits of telemedicine (video visit) versus in person care; bill my insurance or make self-payment for services provided; and responsibility for payment of non-covered services.     Mode of Communication:  Video Conference via [place]    As the provider I attest to compliance with applicable laws and regulations related to telemedicine.    DATA  Interactive Complexity: No  Crisis: No      Progress Since Last Session (Related to Symptoms / Goals / Homework):  Symptoms:  'improving  Focusing on finding balance in activities to support mood.    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 Concern re: sister's eating disorder. Focus on boundaries, self care.   Ongoing No contact with mom, managing contact with stepdad. Continuing desire to focus on positive-productive relationships/activities that support wellness, learning to prioritize personal needs.       Treatment Objective(s) Addressed in This Session:   Use  boundaries and proactive communication in relationships.    Improve quantity and quality of night time sleep / decrease daytime naps   Identify negative self-talk and behaviors: challenge core beliefs, myths, and actions    Interventions:  Supportive Therapy: Provided active listening and validation. Supported continued use of boundaries and limits and reflection of personal and professional priorities to prioritize personal health.     Motivational Interviewing  Target Behavior: continue using and augmenting   boundaries, proactive communication skills to get needs met , continue no contact with mom, focusing on healthy and supportive relationships. Engage in intentional prioritization of activities, resume workouts    Stage of Change: RELAPSE (Returned to unhealthy behavior)     MI Intervention: Expressed Empathy/Understanding, Supported Autonomy, Collaboration, Evocation, Open-ended questions, Reflections: simple and complex, and Change talk (evoked)     Change Talk Expressed by the Patient: Ability to change Reasons to change Need to change Committment to change Activation Taking steps    Provider Response to Change Talk: E - Evoked more info from patient about behavior change, A - Affirmed patient's thoughts, decisions, or attempts at behavior change, and R - Reflected patient's change talk     Assessments completed prior to visit: 2/15/21 DA  The following assessments were completed by patient for this visit:      PHQ9:       2/27/2023     9:47 AM 3/13/2023     8:50 AM 5/22/2023     9:33 AM 6/5/2023     8:29 AM 7/24/2023     8:44 AM 8/10/2023     2:04 PM 8/28/2023     8:17 AM   PHQ-9 SCORE   PHQ-9 Total Score MyChart 5 (Mild depression) 6 (Mild depression) 7 (Mild depression) 6 (Mild depression) 11 (Moderate depression) 10 (Moderate depression) 8 (Mild depression)   PHQ-9 Total Score 5 6 7 6 11 10 8     GAD7:       10/25/2022     3:57 AM 11/14/2022     8:40 AM 11/28/2022     8:14 AM 5/22/2023     9:34 AM  6/5/2023     8:30 AM 7/24/2023     8:45 AM 8/28/2023     8:19 AM   AYDIN-7 SCORE   Total Score 7 (mild anxiety) 7 (mild anxiety) 11 (moderate anxiety) 10 (moderate anxiety) 8 (mild anxiety) 13 (moderate anxiety) 15 (severe anxiety)   Total Score 7 7 11 10 8 13 15     PROMIS 10-Global Health (only subscores and total score):       6/29/2022     4:37 AM 10/3/2022     7:49 AM 10/25/2022     3:59 AM 2/6/2023     9:41 AM 2/20/2023     9:13 AM 5/22/2023     9:35 AM 6/5/2023     8:31 AM   PROMIS-10 Scores Only   Global Mental Health Score 12 12 13 13 13 13 13   Global Physical Health Score 17 18 17 18 17 16 17   PROMIS TOTAL - SUBSCORES 29 30 30 31 30 29 30         ASSESSMENT: Current Emotional / Mental Status (status of significant symptoms):   Risk status (Self / Other harm or suicidal ideation)   Patient denies current fears or concerns for personal safety.   Patient denies current or recent suicidal ideation or behaviors.   Patient denies current or recent homicidal ideation or behaviors.   Patient denies current or recent self injurious behavior or ideation.   Patient denies other safety concerns.   Patient reports there has been no change in risk factors since their last session.     Patient reports there has been no change in protective factors since their last session.     Recommended that patient call 911 or go to the local ED should there be a change in any of these risk factors.     Appearance:   Appropriate    Eye Contact:   Good    Psychomotor Behavior: Normal    Attitude:   Cooperative  Friendly   Orientation:   All   Speech    Rate / Production: Normal     Volume:  Normal    Mood:    Anxious  Depressed ,   Affect:    Appropriate    Thought Content:  Clear    Thought Form:  Coherent  Goal Directed  Logical    Insight:    Good      Medication Review:   No changes to current psychiatric medication(s)     Medication Compliance:   Yes     Changes in Health Issues:   None noted       Chemical Use Review:   Substance  Use: Chemical use reviewed, no active concerns identified      Tobacco Use: No current tobacco use.      Diagnosis:  1. Major depressive disorder, recurrent episode, mild with anxious distress (H)        Collateral Reports Completed:   Not Applicable    PLAN: (Patient Tasks / Therapist Tasks / Other)  Continue using PATRIZIA, FAST and Check the Facts engage intentionally with supportive people, prioritize family/friend  time. Maintain no communication with mother, boundary with stepdad.       DEE Walden [facility] 8/28/2023      _____________________________________________________________  Individual Treatment Plan    Patient's Name: Joey Spears  YOB: 1995    Date of Creation:   9/13/2021  Date Treatment Plan Last Reviewed/Revised: 6/5/2023    There has been demonstrated improvement in functioning while patient has been engaged in psychotherapy/psychological service- if withdrawn the patient would deteriorate and/or relapse.     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    Anticipated number of session for this episode of care:   Anticipation frequency of session: Every other week  Anticipated Duration of each session: 38-52 minutes  Treatment plan will be reviewed in 90 days or when goals have been changed.     MeasurableTreatment Goal(s) related to diagnosis / functional impairment(s)  Goal 1: Client will experience an improvement in mood and anxiety evidenced by self report and AYDIN/PHQ scores of 5 or less    I will know I've met my goal when I'm enjoying my life more (paraphrase) .      Objective #A (Client Action)    Client will  Increase interest, engagement, and pleasure in doing things  Decrease frequency and intensity of feeling down, depressed, hopeless  Improve quantity and quality of night time sleep / decrease daytime naps  Feel less tired and more energy during the day   Identify negative self-talk and behaviors: challenge core beliefs, myths, and actions  Improve concentration, focus, and mindfulness in daily activities   Feel less fidgety, restless or slow in daily activities / interpersonal interactions  Use boundaries and proactive communication in relationships.  Status:    6/5/2023    Intervention(s)  Therapist will teach behavioral activation, sleep hygiene, CBT, DBT and ACT skills, proactive communication, mindfulness, grounding skills to support mood improvement and anxiety reduction.    Patient has reviewed and agreed to the above plan.      DEE Walden,  LICSW 6/5/2023      Answers for HPI/ROS submitted by the patient on 8/16/2022  If you checked off any problems, how difficult have these problems made it for you to do your work, take care of things at home, or get along with other people?: Somewhat difficult  PHQ9 TOTAL SCORE: 8  AYDIN 7 TOTAL SCORE: 12

## 2024-01-11 ENCOUNTER — OFFICE VISIT (OUTPATIENT)
Dept: URGENT CARE | Facility: URGENT CARE | Age: 29
End: 2024-01-11
Payer: COMMERCIAL

## 2024-01-11 VITALS
RESPIRATION RATE: 16 BRPM | OXYGEN SATURATION: 98 % | SYSTOLIC BLOOD PRESSURE: 98 MMHG | WEIGHT: 150 LBS | HEIGHT: 64 IN | BODY MASS INDEX: 25.61 KG/M2 | HEART RATE: 81 BPM | TEMPERATURE: 98.1 F | DIASTOLIC BLOOD PRESSURE: 64 MMHG

## 2024-01-11 DIAGNOSIS — N61.0 BREAST INFECTION: Primary | ICD-10-CM

## 2024-01-11 LAB
FLUAV AG SPEC QL IA: NEGATIVE
FLUBV AG SPEC QL IA: NEGATIVE

## 2024-01-11 PROCEDURE — 87804 INFLUENZA ASSAY W/OPTIC: CPT | Performed by: STUDENT IN AN ORGANIZED HEALTH CARE EDUCATION/TRAINING PROGRAM

## 2024-01-11 PROCEDURE — 99214 OFFICE O/P EST MOD 30 MIN: CPT | Performed by: STUDENT IN AN ORGANIZED HEALTH CARE EDUCATION/TRAINING PROGRAM

## 2024-01-11 RX ORDER — CEPHALEXIN 500 MG/1
500 CAPSULE ORAL EVERY 6 HOURS
Qty: 20 CAPSULE | Refills: 0 | Status: SHIPPED | OUTPATIENT
Start: 2024-01-11 | End: 2024-01-16

## 2024-01-11 NOTE — PROGRESS NOTES
Assessment & Plan     Breast infection  R breast with crusting and small pus discharge around nipple ring medial site along with tenderness of R upper outer breast quadrant and R axillary area. No axillary lymphadenopathy, no fluid collections or loculations appreciated. No nipple inversion, lactation, or peau d'orange. Vitals and exam reassuring against bacteremia however given patient's symptoms of axillary pain with R arm numbness, recommend US to rule out abscess, pectoral involvement. [facility] called and as they do not do breast ultrasounds, will send order to breast center to rule out abscess. Discussed with patient that nipple rings can cause subareolar breast abscess and recurrent nipple infections. Most likely breast cellulitis vs breast abscess so will treat with abx and send to the [facility] for advanced imaging.  - US Breast Right Limited 1-3 Quadrants  - MA Diagnostic Digital Right  - cephALEXin (KEFLEX) 500 MG capsule  Dispense: 20 capsule; Refill: 0     Return for if symptoms do not improve in 2-3 days.    Abi Guzman, DO  she/her  [facility]    Subjective     Joey Spears is a 28 year old female who presents to clinic today for the following health issues:    HPI    Got R nipple pierced 3 years ago  1 week ago white discharge from the right nipple  This morning, started having symptoms of a sore chest, pain in R arm, numbness in R arm  Not feeling well and + dizzy, nausea  No fevers, chills, vomiting  Menstruating right now, usually has breast pain associated with it     Ultrasound of left breast in July, 2023 showing infection with no abscess.  Was given Rocephin and doxycycline with complete improvement. /56 at that visit      Past Medical History:   Diagnosis Date    Depressive disorder 2008    Previously medicated     No Known Allergies  Current Outpatient Medications   Medication    buPROPion (WELLBUTRIN XL) 300 MG 24 hr tablet    busPIRone (BUSPAR) 10 MG  "tablet    cephALEXin (KEFLEX) 500 MG capsule    paragard intrauterine copper device     No current facility-administered medications for this visit.      Review of Systems  Constitutional, HEENT, cardiovascular, pulmonary, gi and gu systems are negative, except as otherwise noted.      Objective    BP 98/64   Pulse 81   Temp 98.1  F (36.7  C) (Temporal)   Resp 16   Ht 1.626 m (5' 4\")   Wt 68 kg (150 lb)   SpO2 98%   BMI 25.75 kg/m      Physical Exam  HENT:      Head: Normocephalic.   Eyes:      Pupils: Pupils are equal, round, and reactive to light.   Chest:      Chest wall: Tenderness present. No swelling, crepitus or edema.   Breasts:     Breasts are symmetrical.      Right: Nipple discharge (scabbing with scant discharge; + piercing) and tenderness (Tenderness over areas marked below. No erythema, swelling, loculations appreciated) present. No swelling, mass or skin change.      Left: Normal. No bleeding, nipple discharge (+ piercing) or tenderness.       Lymphadenopathy:      Upper Body:      Right upper body: No axillary or pectoral adenopathy.   Neurological:      Mental Status: She is alert and oriented to person, place, and time.      Sensory: No sensory deficit.      Motor: No weakness.        Results for orders placed or performed in visit on 01/11/24   Influenza A & B Antigen - Clinic Collect     Status: Normal    Specimen: Nose; Swab   Result Value Ref Range    Influenza A antigen Negative Negative    Influenza B antigen Negative Negative    Narrative    Test results must be correlated with clinical data. If necessary, results should be confirmed by a molecular assay or viral culture.           The use of Dragon/SCIenergyation services may have been used to construct the content in this note; any grammatical or spelling errors are non-intentional. Please contact the author of this note directly if you are in need of any clarification.  "

## 2024-01-11 NOTE — PATIENT INSTRUCTIONS
NSAIDs with cold compress to reduce localized pain and swelling    Please present to the ED if you develop a fever, night sweats, cannot raise your right arm, or bleeding from your nipple

## 2024-02-05 ENCOUNTER — VIRTUAL VISIT (OUTPATIENT)
Dept: PSYCHOLOGY | Facility: CLINIC | Age: 29
End: 2024-02-05
Payer: COMMERCIAL

## 2024-02-05 DIAGNOSIS — F33.0 MAJOR DEPRESSIVE DISORDER, RECURRENT EPISODE, MILD WITH ANXIOUS DISTRESS (H): Primary | ICD-10-CM

## 2024-02-05 PROCEDURE — 90837 PSYTX W PT 60 MINUTES: CPT | Mod: 95

## 2024-02-05 NOTE — PROGRESS NOTES
[facility]                                     Progress Note    Patient Name: Joey Spears  Date:   2/5/2024        Service Type: Individual      Session Start Time: 2:00 pm  Session End Time: 2:53 pm     Session Length: 53 min    Session #: 44    Attendees: Client attended alone    Service Modality:    Telemedicine Visit: The patient's condition can be safely assessed and treated via synchronous audio and visual telemedicine encounter.      Reason for Telemedicine Visit: Patient has requested telehealth visit    Originating Site (Patient Location): Patient's home    Distant Site (Provider Location): Provider Remote Setting- Home Office    Consent:  The patient/guardian has verbally consented to: the potential risks and benefits of telemedicine (video visit) versus in person care; bill my insurance or make self-payment for services provided; and responsibility for payment of non-covered services.     Mode of Communication:  Video Conference via Jix[employer]    As the provider I attest to compliance with applicable laws and regulations related to telemedicine.    DATA  Interactive Complexity: No  Crisis: No      Progress Since Last Session (Related to Symptoms / Goals / Homework):  Symptoms:  inconsistent due to situational stressors    Homework: Achieved / completed to satisfaction      Episode of Care Goals: Satisfactory progress - ACTION (Actively working towards change); Intervened by reinforcing change plan / affirming steps taken     Current / Ongoing Stressors and Concerns:  Changed job to a new salon, some disarray, learning of new products, systems and processes-mass exodus of staff.  Concern re: sister's eating disorder. Focus on boundaries, self care.   Ongoing No contact with mom, managing contact with stepdad. Continuing desire to focus on positive-productive relationships/activities that support wellness, learning to prioritize personal needs.       Treatment  Objective(s) Addressed in This Session:   Use boundaries and proactive communication in relationships.    Improve quantity and quality of night time sleep / decrease daytime naps   Identify negative self-talk and behaviors: challenge core beliefs, myths, and actions    Interventions:  Supportive Therapy: Provided active listening and validation. Surfaced triggers around work disarray, reinforced value of professionalism at work    Motivational Interviewing  Target Behavior: continue using and augmenting   boundaries, proactive communication skills to get needs met , continue no contact with mom, focusing on healthy and supportive relationships. Engage in intentional prioritization of activities, resume workouts    Stage of Change: ACTION (Actively working towards change)     MI Intervention: Expressed Empathy/Understanding, Supported Autonomy, Collaboration, Evocation, Open-ended questions, Reflections: simple and complex, and Change talk (evoked)     Change Talk Expressed by the Patient: Ability to change Reasons to change Need to change Committment to change Activation Taking steps    Provider Response to Change Talk: E - Evoked more info from patient about behavior change, A - Affirmed patient's thoughts, decisions, or attempts at behavior change, and R - Reflected patient's change talk     Assessments completed prior to visit: 2/15/21 DA  The following assessments were completed by patient for this visit:      PHQ9:       2/27/2023     9:47 AM 3/13/2023     8:50 AM 5/22/2023     9:33 AM 6/5/2023     8:29 AM 7/24/2023     8:44 AM 8/10/2023     2:04 PM 8/28/2023     8:17 AM   PHQ-9 SCORE   PHQ-9 Total Score MyChart 5 (Mild depression) 6 (Mild depression) 7 (Mild depression) 6 (Mild depression) 11 (Moderate depression) 10 (Moderate depression) 8 (Mild depression)   PHQ-9 Total Score 5 6 7 6 11 10 8     GAD7:       10/25/2022     3:57 AM 11/14/2022     8:40 AM 11/28/2022     8:14 AM 5/22/2023     9:34 AM 6/5/2023      8:30 AM 7/24/2023     8:45 AM 8/28/2023     8:19 AM   AYDIN-7 SCORE   Total Score 7 (mild anxiety) 7 (mild anxiety) 11 (moderate anxiety) 10 (moderate anxiety) 8 (mild anxiety) 13 (moderate anxiety) 15 (severe anxiety)   Total Score 7 7 11 10 8 13 15     PROMIS 10-Global Health (only subscores and total score):       6/29/2022     4:37 AM 10/3/2022     7:49 AM 10/25/2022     3:59 AM 2/6/2023     9:41 AM 2/20/2023     9:13 AM 5/22/2023     9:35 AM 6/5/2023     8:31 AM   PROMIS-10 Scores Only   Global Mental Health Score 12 12 13 13 13 13 13   Global Physical Health Score 17 18 17 18 17 16 17   PROMIS TOTAL - SUBSCORES 29 30 30 31 30 29 30         ASSESSMENT: Current Emotional / Mental Status (status of significant symptoms):   Risk status (Self / Other harm or suicidal ideation)   Patient denies current fears or concerns for personal safety.   Patient denies current or recent suicidal ideation or behaviors.   Patient denies current or recent homicidal ideation or behaviors.   Patient denies current or recent self injurious behavior or ideation.   Patient denies other safety concerns.   Patient reports there has been no change in risk factors since their last session.     Patient reports there has been no change in protective factors since their last session.     Recommended that patient call 911 or go to the local ED should there be a change in any of these risk factors.     Appearance:   Appropriate    Eye Contact:   Good    Psychomotor Behavior: Normal    Attitude:   Cooperative  Friendly   Orientation:   All   Speech    Rate / Production: Normal     Volume:  Normal    Mood:    Anxious  Depressed ,   Affect:    Appropriate    Thought Content:  Clear    Thought Form:  Coherent  Goal Directed  Logical    Insight:    Good      Medication Review:   No changes to current psychiatric medication(s)     Medication Compliance:   Yes     Changes in Health Issues:   None noted       Chemical Use Review:   Substance Use:  Chemical use reviewed, no active concerns identified      Tobacco Use: No current tobacco use.      Diagnosis:  No diagnosis found.      Collateral Reports Completed:   Not Applicable    PLAN: (Patient Tasks / Therapist Tasks / Other)  Continue using PATRIZIA, FAST and Check the Facts engage intentionally with supportive people, prioritize family/friend  time. Maintain no communication with mother, boundary with stepdad.       Janae Syed, MSW LICSW 2/5/2024        _____________________________________________________________  Individual Treatment Plan    Patient's Name: Joey Spears  YOB: 1995    Date of Creation:   9/13/2021  Date Treatment Plan Last Reviewed/Revised: 2/5/2024    There has been demonstrated improvement in functioning while patient has been engaged in psychotherapy/psychological service- if withdrawn the patient would deteriorate and/or relapse.     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    Anticipated number of session for this episode of care:   Anticipation frequency of session: Every other week  Anticipated Duration of each session: 38-52 minutes  Treatment plan will be reviewed in 90 days or when goals have been changed.     MeasurableTreatment Goal(s) related to diagnosis / functional impairment(s)  Goal 1: Client will experience an improvement in mood and anxiety evidenced by self report and AYDIN/PHQ scores of 5 or less    I will know I've met my goal when I'm enjoying my life more (paraphrase) .      Objective #A (Client Action)    Client will Increase interest, engagement, and pleasure in doing things  Decrease  frequency and intensity of feeling down, depressed, hopeless  Improve quantity and quality of night time sleep / decrease daytime naps  Feel less tired and more energy during the day   Identify negative self-talk and behaviors: challenge core beliefs, myths, and actions  Improve concentration, focus, and mindfulness in daily activities   Feel less fidgety, restless or slow in daily activities / interpersonal interactions  Use boundaries and proactive communication in relationships.  Status:    2/5/2024    Intervention(s)  Therapist will teach behavioral activation, sleep hygiene, CBT, DBT and ACT skills, proactive communication, mindfulness, grounding skills to support mood improvement and anxiety reduction.    Patient has reviewed and agreed to the above plan.      DEE Walden,  LICSW 2/5/2024      Answers for HPI/ROS submitted by the patient on 8/16/2022  If you checked off any problems, how difficult have these problems made it for you to do your work, take care of things at home, or get along with other people?: Somewhat difficult  PHQ9 TOTAL SCORE: 8  AYDIN 7 TOTAL SCORE: 12

## 2024-03-11 ENCOUNTER — VIRTUAL VISIT (OUTPATIENT)
Dept: PSYCHOLOGY | Facility: CLINIC | Age: 29
End: 2024-03-11
Payer: COMMERCIAL

## 2024-03-11 DIAGNOSIS — F33.0 MAJOR DEPRESSIVE DISORDER, RECURRENT EPISODE, MILD WITH ANXIOUS DISTRESS (H): Primary | ICD-10-CM

## 2024-03-11 PROCEDURE — 90837 PSYTX W PT 60 MINUTES: CPT | Mod: 95

## 2024-03-11 NOTE — PROGRESS NOTES
[facility]                                     Progress Note    Patient Name: Joey Spears  Date:   3/11/2024        Service Type: Individual      Session Start Time: 1:00 pm  Session End Time: 1:53 pm     Session Length: 53 min    Session #: 45    Attendees: Client attended alone    Service Modality:    Telemedicine Visit: The patient's condition can be safely assessed and treated via synchronous audio and visual telemedicine encounter.      Reason for Telemedicine Visit: Patient has requested telehealth visit    Originating Site (Patient Location): Patient's home    Distant Site (Provider Location): Provider Remote Setting- Home Office    Consent:  The patient/guardian has verbally consented to: the potential risks and benefits of telemedicine (video visit) versus in person care; bill my insurance or make self-payment for services provided; and responsibility for payment of non-covered services.     Mode of Communication:  Video Conference via [email or web address]    As the provider I attest to compliance with applicable laws and regulations related to telemedicine.    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 Concern re: sister's eating disorder. Focus on boundaries, self care.   Ongoing No contact with mom, managing contact with stepdad. Continuing desire to focus on positive-productive relationships/activities that support wellness, learning to prioritize personal needs.       Treatment Objective(s) Addressed in This Session:   Use  boundaries and proactive communication in relationships.    Improve quantity and quality of night time sleep / decrease daytime naps   Identify negative self-talk and behaviors: challenge core beliefs, myths, and actions    Interventions:  Supportive Therapy: Provided active listening and validation. Explored avenues for employment  Motivational Interviewing  Target Behavior: continue using and augmenting   boundaries, proactive communication skills to get needs met , continue no contact with mom, focusing on healthy and supportive relationships.   Stage of Change: ACTION (Actively working towards change)     MI Intervention: Expressed Empathy/Understanding, Supported Autonomy, Collaboration, Evocation, Open-ended questions, Reflections: simple and complex, and Change talk (evoked)     Change Talk Expressed by the Patient: Ability to change Reasons to change Need to change Committment to change Activation Taking steps    Provider Response to Change Talk: E - Evoked more info from patient about behavior change, A - Affirmed patient's thoughts, decisions, or attempts at behavior change, and R - Reflected patient's change talk     Assessments completed prior to visit: 2/15/21 DA  The following assessments were completed by patient for this visit:      PHQ9:       2/27/2023     9:47 AM 3/13/2023     8:50 AM 5/22/2023     9:33 AM 6/5/2023     8:29 AM 7/24/2023     8:44 AM 8/10/2023     2:04 PM 8/28/2023     8:17 AM   PHQ-9 SCORE   PHQ-9 Total Score MyChart 5 (Mild depression) 6 (Mild depression) 7 (Mild depression) 6 (Mild depression) 11 (Moderate depression) 10 (Moderate depression) 8 (Mild depression)   PHQ-9 Total Score 5 6 7 6 11 10 8     GAD7:       10/25/2022     3:57 AM 11/14/2022     8:40 AM 11/28/2022     8:14 AM 5/22/2023     9:34 AM 6/5/2023     8:30 AM 7/24/2023     8:45 AM 8/28/2023     8:19 AM   AYDIN-7 SCORE   Total Score 7 (mild anxiety) 7 (mild anxiety) 11 (moderate anxiety) 10 (moderate anxiety) 8 (mild  anxiety) 13 (moderate anxiety) 15 (severe anxiety)   Total Score 7 7 11 10 8 13 15     PROMIS 10-Global Health (only subscores and total score):       6/29/2022     4:37 AM 10/3/2022     7:49 AM 10/25/2022     3:59 AM 2/6/2023     9:41 AM 2/20/2023     9:13 AM 5/22/2023     9:35 AM 6/5/2023     8:31 AM   PROMIS-10 Scores Only   Global Mental Health Score 12 12 13 13 13 13 13   Global Physical Health Score 17 18 17 18 17 16 17   PROMIS TOTAL - SUBSCORES 29 30 30 31 30 29 30         ASSESSMENT: Current Emotional / Mental Status (status of significant symptoms):   Risk status (Self / Other harm or suicidal ideation)   Patient denies current fears or concerns for personal safety.   Patient denies current or recent suicidal ideation or behaviors.   Patient denies current or recent homicidal ideation or behaviors.   Patient denies current or recent self injurious behavior or ideation.   Patient denies other safety concerns.   Patient reports there has been no change in risk factors since their last session.     Patient reports there has been no change in protective factors since their last session.     Recommended that patient call 911 or go to the local ED should there be a change in any of these risk factors.     Appearance:   Appropriate    Eye Contact:   Good    Psychomotor Behavior: Normal    Attitude:   Cooperative  Friendly   Orientation:   All   Speech    Rate / Production: Normal     Volume:  Normal    Mood:    Anxious  Depressed ,   Affect:    Appropriate    Thought Content:  Clear    Thought Form:  Coherent  Goal Directed  Logical    Insight:    Good      Medication Review:   No changes to current psychiatric medication(s)     Medication Compliance:   Yes     Changes in Health Issues:   None noted       Chemical Use Review:   Substance Use: Chemical use reviewed, no active concerns identified      Tobacco Use: No current tobacco use.      Diagnosis:  1. Major depressive disorder, recurrent episode, mild with  anxious distress (H24)      Collateral Reports Completed:   Not Applicable    PLAN: (Patient Tasks / Therapist Tasks / Other)  Continue using PATRIZIA, FAST and Check the Facts engage intentionally with supportive people, prioritize family/friend  time. Maintain no communication with mother, boundary with stepdad. Continue self care      Janae Syed DEE [facility] 3/11/2024        _____________________________________________________________  Individual Treatment Plan    Patient's Name: Joey Spears  YOB: 1995    Date of Creation:   9/13/2021  Date Treatment Plan Last Reviewed/Revised: 2/5/2024    There has been demonstrated improvement in functioning while patient has been engaged in psychotherapy/psychological service- if withdrawn the patient would deteriorate and/or relapse.     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    Anticipated number of session for this episode of care:   Anticipation frequency of session: Every other week  Anticipated Duration of each session: 38-52 minutes  Treatment plan will be reviewed in 90 days or when goals have been changed.     MeasurableTreatment Goal(s) related to diagnosis / functional impairment(s)  Goal 1: Client will experience an improvement in mood and anxiety evidenced by self report and AYDIN/PHQ scores of 5 or less    I will know I've met my goal when I'm enjoying my life more (paraphrase) .      Objective #A (Client Action)    Client will Increase interest, engagement, and pleasure in doing things  Decrease frequency and intensity of feeling down, depressed, hopeless  Improve quantity and quality of  night time sleep / decrease daytime naps  Feel less tired and more energy during the day   Identify negative self-talk and behaviors: challenge core beliefs, myths, and actions  Improve concentration, focus, and mindfulness in daily activities   Feel less fidgety, restless or slow in daily activities / interpersonal interactions  Use boundaries and proactive communication in relationships.  Status:    2/5/2024    Intervention(s)  Therapist will teach behavioral activation, sleep hygiene, CBT, DBT and ACT skills, proactive communication, mindfulness, grounding skills to support mood improvement and anxiety reduction.    Patient has reviewed and agreed to the above plan.      DEE Walden,  LICSW 2/5/2024      Answers for HPI/ROS submitted by the patient on 8/16/2022  If you checked off any problems, how difficult have these problems made it for you to do your work, take care of things at home, or get along with other people?: Somewhat difficult  PHQ9 TOTAL SCORE: 8  AYDIN 7 TOTAL SCORE: 12

## 2024-03-25 ENCOUNTER — VIRTUAL VISIT (OUTPATIENT)
Dept: PSYCHOLOGY | Facility: CLINIC | Age: 29
End: 2024-03-25
Payer: COMMERCIAL

## 2024-03-25 DIAGNOSIS — F33.0 MAJOR DEPRESSIVE DISORDER, RECURRENT EPISODE, MILD WITH ANXIOUS DISTRESS (H): Primary | ICD-10-CM

## 2024-03-25 PROCEDURE — 90837 PSYTX W PT 60 MINUTES: CPT | Mod: 95

## 2024-03-25 NOTE — PROGRESS NOTES
[facility]                                     Progress Note    Patient Name: Joey Spears  Date:   3/25/2024        Service Type: Individual      Session Start Time: 1:02 pm  Session End Time: 1:55 pm     Session Length: 53 min    Session #: 46    Attendees: Client attended alone    Service Modality:    Telemedicine Visit: The patient's condition can be safely assessed and treated via synchronous audio and visual telemedicine encounter.      Reason for Telemedicine Visit: Patient has requested telehealth visit    Originating Site (Patient Location): Patient's home    Distant Site (Provider Location): Provider Remote Setting- Home Office    Consent:  The patient/guardian has verbally consented to: the potential risks and benefits of telemedicine (video visit) versus in person care; bill my insurance or make self-payment for services provided; and responsibility for payment of non-covered services.     Mode of Communication:  Video Conference via [employer]    As the provider I attest to compliance with applicable laws and regulations related to telemedicine.    DATA  Interactive Complexity: No  Crisis: No      Progress Since Last Session (Related to Symptoms / Goals / Homework):  Symptoms:  inconsistent due to situational stressors    Homework: Achieved / completed to satisfaction      Episode of Care Goals: Satisfactory progress - ACTION (Actively working towards change); Intervened by reinforcing change plan / affirming steps taken     Current / Ongoing Stressors and Concerns:  Continued new salon disarray, exodus of staff. Partner job loss, MH dx. Focus on boundaries, self care.   Ongoing No contact with mom, managing contact with stepdad. Continuing desire to focus on positive-productive relationships/activities that support wellness, learning to prioritize personal needs.       Treatment Objective(s) Addressed in This Session:   Use boundaries and proactive communication in  relationships.    Improve quantity and quality of night time sleep / decrease daytime naps   Identify negative self-talk and behaviors: challenge core beliefs, myths, and actions    Interventions:  Supportive Therapy: Provided active listening and validation. Explored partner stressors,  avenues for relational boundaries  Motivational Interviewing  Target Behavior: continue using and augmenting   boundaries, proactive communication skills to get needs met , continue no contact with mom, focusing on healthy and supportive relationships.   Stage of Change: ACTION (Actively working towards change)     MI Intervention: Expressed Empathy/Understanding, Supported Autonomy, Collaboration, Evocation, Open-ended questions, Reflections: simple and complex, and Change talk (evoked)     Change Talk Expressed by the Patient: Ability to change Reasons to change Need to change Committment to change Activation Taking steps    Provider Response to Change Talk: E - Evoked more info from patient about behavior change, A - Affirmed patient's thoughts, decisions, or attempts at behavior change, and R - Reflected patient's change talk     Assessments completed prior to visit: 2/15/21 DA  The following assessments were completed by patient for this visit:      PHQ9:       2/27/2023     9:47 AM 3/13/2023     8:50 AM 5/22/2023     9:33 AM 6/5/2023     8:29 AM 7/24/2023     8:44 AM 8/10/2023     2:04 PM 8/28/2023     8:17 AM   PHQ-9 SCORE   PHQ-9 Total Score MyChart 5 (Mild depression) 6 (Mild depression) 7 (Mild depression) 6 (Mild depression) 11 (Moderate depression) 10 (Moderate depression) 8 (Mild depression)   PHQ-9 Total Score 5 6 7 6 11 10 8     GAD7:       10/25/2022     3:57 AM 11/14/2022     8:40 AM 11/28/2022     8:14 AM 5/22/2023     9:34 AM 6/5/2023     8:30 AM 7/24/2023     8:45 AM 8/28/2023     8:19 AM   AYDIN-7 SCORE   Total Score 7 (mild anxiety) 7 (mild anxiety) 11 (moderate anxiety) 10 (moderate anxiety) 8 (mild anxiety) 13  (moderate anxiety) 15 (severe anxiety)   Total Score 7 7 11 10 8 13 15     PROMIS 10-Global Health (only subscores and total score):       6/29/2022     4:37 AM 10/3/2022     7:49 AM 10/25/2022     3:59 AM 2/6/2023     9:41 AM 2/20/2023     9:13 AM 5/22/2023     9:35 AM 6/5/2023     8:31 AM   PROMIS-10 Scores Only   Global Mental Health Score 12 12 13 13 13 13 13   Global Physical Health Score 17 18 17 18 17 16 17   PROMIS TOTAL - SUBSCORES 29 30 30 31 30 29 30         ASSESSMENT: Current Emotional / Mental Status (status of significant symptoms):   Risk status (Self / Other harm or suicidal ideation)   Patient denies current fears or concerns for personal safety.   Patient denies current or recent suicidal ideation or behaviors.   Patient denies current or recent homicidal ideation or behaviors.   Patient denies current or recent self injurious behavior or ideation.   Patient denies other safety concerns.   Patient reports there has been no change in risk factors since their last session.     Patient reports there has been no change in protective factors since their last session.     Recommended that patient call 911 or go to the local ED should there be a change in any of these risk factors.     Appearance:   Appropriate    Eye Contact:   Good    Psychomotor Behavior: Normal    Attitude:   Cooperative  Friendly   Orientation:   All   Speech    Rate / Production: Normal     Volume:  Normal    Mood:    Anxious  Depressed ,   Affect:    Appropriate    Thought Content:  Clear    Thought Form:  Coherent  Goal Directed  Logical    Insight:    Good      Medication Review:   No changes to current psychiatric medication(s)     Medication Compliance:   Yes     Changes in Health Issues:   None noted       Chemical Use Review:   Substance Use: Chemical use reviewed, no active concerns identified      Tobacco Use: No current tobacco use.      Diagnosis:  1. Major depressive disorder, recurrent episode, mild with anxious  distress (H24)      Collateral Reports Completed:   Not Applicable    PLAN: (Patient Tasks / Therapist Tasks / Other)  Continue using PATRIZIA, FAST and Check the Facts engage intentionally with supportive people, prioritize family/friend  time. Maintain no communication with mother, boundary with stepdad. Continue self care      Janae Syde DEE [facility] 3/25/2024        _____________________________________________________________  Individual Treatment Plan    Patient's Name: Joey Spears  YOB: 1995    Date of Creation:   9/13/2021  Date Treatment Plan Last Reviewed/Revised: 2/5/2024    There has been demonstrated improvement in functioning while patient has been engaged in psychotherapy/psychological service- if withdrawn the patient would deteriorate and/or relapse.     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    Anticipated number of session for this episode of care:   Anticipation frequency of session: Every other week  Anticipated Duration of each session: 38-52 minutes  Treatment plan will be reviewed in 90 days or when goals have been changed.     MeasurableTreatment Goal(s) related to diagnosis / functional impairment(s)  Goal 1: Client will experience an improvement in mood and anxiety evidenced by self report and AYDIN/PHQ scores of 5 or less    I will know I've met my goal when I'm enjoying my life more (paraphrase) .      Objective #A (Client Action)    Client will Increase interest, engagement, and pleasure in doing things  Decrease frequency and intensity of feeling down, depressed, hopeless  Improve quantity and quality of night  time sleep / decrease daytime naps  Feel less tired and more energy during the day   Identify negative self-talk and behaviors: challenge core beliefs, myths, and actions  Improve concentration, focus, and mindfulness in daily activities   Feel less fidgety, restless or slow in daily activities / interpersonal interactions  Use boundaries and proactive communication in relationships.  Status:    2/5/2024    Intervention(s)  Therapist will teach behavioral activation, sleep hygiene, CBT, DBT and ACT skills, proactive communication, mindfulness, grounding skills to support mood improvement and anxiety reduction.    Patient has reviewed and agreed to the above plan.      DEE Walden,  LICSW 2/5/2024      Answers for HPI/ROS submitted by the patient on 8/16/2022  If you checked off any problems, how difficult have these problems made it for you to do your work, take care of things at home, or get along with other people?: Somewhat difficult  PHQ9 TOTAL SCORE: 8  AYDIN 7 TOTAL SCORE: 12

## 2024-04-08 ENCOUNTER — VIRTUAL VISIT (OUTPATIENT)
Dept: PSYCHOLOGY | Facility: CLINIC | Age: 29
End: 2024-04-08
Payer: COMMERCIAL

## 2024-04-08 DIAGNOSIS — F33.0 MAJOR DEPRESSIVE DISORDER, RECURRENT EPISODE, MILD WITH ANXIOUS DISTRESS (H): Primary | ICD-10-CM

## 2024-04-08 PROCEDURE — 90837 PSYTX W PT 60 MINUTES: CPT | Mod: 95

## 2024-04-08 NOTE — PROGRESS NOTES
[facility]                                     Progress Note    Patient Name: Joey Spears  Date:   4/8/2024        Service Type: Individual      Session Start Time: 1:02 pm  Session End Time: 1:55 pm     Session Length: 53 min    Session #: 47    Attendees: Client attended alone    Service Modality:    Telemedicine Visit: The patient's condition can be safely assessed and treated via synchronous audio and visual telemedicine encounter.      Reason for Telemedicine Visit: Patient has requested telehealth visit    Originating Site (Patient Location): Patient's home    Distant Site (Provider Location): Provider Remote Setting- Home Office    Consent:  The patient/guardian has verbally consented to: the potential risks and benefits of telemedicine (video visit) versus in person care; bill my insurance or make self-payment for services provided; and responsibility for payment of non-covered services.     Mode of Communication:  Video Conference via [employer]    As the provider I attest to compliance with applicable laws and regulations related to telemedicine.    DATA  Interactive Complexity: No  Crisis: No      Progress Since Last Session (Related to Symptoms / Goals / Homework):  Symptoms:  inconsistent due to situational stressors    Homework: Achieved / completed to satisfaction      Episode of Care Goals: Satisfactory progress - ACTION (Actively working towards change); Intervened by reinforcing change plan / affirming steps taken     Current / Ongoing Stressors and Concerns:  Improving work stability. Partner job loss, MH dx. Focus on boundaries, self care.   Ongoing No contact with mom, managing contact with stepdad. Continuing desire to focus on positive-productive relationships/activities that support wellness, learning to prioritize personal needs.       Treatment Objective(s) Addressed in This Session:   Use boundaries and proactive communication in relationships.     Improve quantity and quality of night time sleep / decrease daytime naps   Identify negative self-talk and behaviors: challenge core beliefs, myths, and actions    Interventions:  Supportive Therapy: Provided active listening and validation. Explored partner stressors,  avenues for relational boundaries  Motivational Interviewing  Target Behavior: continue using and augmenting   boundaries, proactive communication skills to get needs met , continue no contact with mom, focusing on healthy and supportive relationships.   Stage of Change: ACTION (Actively working towards change)     MI Intervention: Expressed Empathy/Understanding, Supported Autonomy, Collaboration, Evocation, Open-ended questions, Reflections: simple and complex, and Change talk (evoked)     Change Talk Expressed by the Patient: Ability to change Reasons to change Need to change Committment to change Activation Taking steps    Provider Response to Change Talk: E - Evoked more info from patient about behavior change, A - Affirmed patient's thoughts, decisions, or attempts at behavior change, and R - Reflected patient's change talk     Assessments completed prior to visit: 2/15/21 DA  The following assessments were completed by patient for this visit:      PHQ9:       2/27/2023     9:47 AM 3/13/2023     8:50 AM 5/22/2023     9:33 AM 6/5/2023     8:29 AM 7/24/2023     8:44 AM 8/10/2023     2:04 PM 8/28/2023     8:17 AM   PHQ-9 SCORE   PHQ-9 Total Score MyChart 5 (Mild depression) 6 (Mild depression) 7 (Mild depression) 6 (Mild depression) 11 (Moderate depression) 10 (Moderate depression) 8 (Mild depression)   PHQ-9 Total Score 5 6 7 6 11 10 8     GAD7:       10/25/2022     3:57 AM 11/14/2022     8:40 AM 11/28/2022     8:14 AM 5/22/2023     9:34 AM 6/5/2023     8:30 AM 7/24/2023     8:45 AM 8/28/2023     8:19 AM   AYDIN-7 SCORE   Total Score 7 (mild anxiety) 7 (mild anxiety) 11 (moderate anxiety) 10 (moderate anxiety) 8 (mild anxiety) 13 (moderate  anxiety) 15 (severe anxiety)   Total Score 7 7 11 10 8 13 15     PROMIS 10-Global Health (only subscores and total score):       6/29/2022     4:37 AM 10/3/2022     7:49 AM 10/25/2022     3:59 AM 2/6/2023     9:41 AM 2/20/2023     9:13 AM 5/22/2023     9:35 AM 6/5/2023     8:31 AM   PROMIS-10 Scores Only   Global Mental Health Score 12 12 13 13 13 13 13   Global Physical Health Score 17 18 17 18 17 16 17   PROMIS TOTAL - SUBSCORES 29 30 30 31 30 29 30         ASSESSMENT: Current Emotional / Mental Status (status of significant symptoms):   Risk status (Self / Other harm or suicidal ideation)   Patient denies current fears or concerns for personal safety.   Patient denies current or recent suicidal ideation or behaviors.   Patient denies current or recent homicidal ideation or behaviors.   Patient denies current or recent self injurious behavior or ideation.   Patient denies other safety concerns.   Patient reports there has been no change in risk factors since their last session.     Patient reports there has been no change in protective factors since their last session.     Recommended that patient call 911 or go to the local ED should there be a change in any of these risk factors.     Appearance:   Appropriate    Eye Contact:   Good    Psychomotor Behavior: Normal    Attitude:   Cooperative  Friendly   Orientation:   All   Speech    Rate / Production: Normal     Volume:  Normal    Mood:    Anxious ,   Affect:    Appropriate    Thought Content:  Clear    Thought Form:  Coherent  Goal Directed  Logical    Insight:    Good      Medication Review:   No changes to current psychiatric medication(s)     Medication Compliance:   Yes     Changes in Health Issues:   None noted       Chemical Use Review:   Substance Use: Chemical use reviewed, no active concerns identified      Tobacco Use: No current tobacco use.      Diagnosis:  1. Major depressive disorder, recurrent episode, mild with anxious distress (H24)       Collateral Reports Completed:   Not Applicable    PLAN: (Patient Tasks / Therapist Tasks / Other)  Continue using PATRIZIA, FAST and Check the Facts engage intentionally with supportive people, prioritize family/friend  time. Maintain no communication with mother, boundary with stepdad. Continue self care      DEE Walden [facility] 4/8/2024        _____________________________________________________________  Individual Treatment Plan    Patient's Name: Joey Spears  YOB: 1995    Date of Creation:   9/13/2021  Date Treatment Plan Last Reviewed/Revised: 2/5/2024    There has been demonstrated improvement in functioning while patient has been engaged in psychotherapy/psychological service- if withdrawn the patient would deteriorate and/or relapse.     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    Anticipated number of session for this episode of care:   Anticipation frequency of session: Every other week  Anticipated Duration of each session: 38-52 minutes  Treatment plan will be reviewed in 90 days or when goals have been changed.     MeasurableTreatment Goal(s) related to diagnosis / functional impairment(s)  Goal 1: Client will experience an improvement in mood and anxiety evidenced by self report and AYDIN/PHQ scores of 5 or less    I will know I've met my goal when I'm enjoying my life more (paraphrase) .      Objective #A (Client Action)    Client will Increase interest, engagement, and pleasure in doing things  Decrease frequency and intensity of feeling down, depressed, hopeless  Improve quantity and quality of night time sleep /  decrease daytime naps  Feel less tired and more energy during the day   Identify negative self-talk and behaviors: challenge core beliefs, myths, and actions  Improve concentration, focus, and mindfulness in daily activities   Feel less fidgety, restless or slow in daily activities / interpersonal interactions  Use boundaries and proactive communication in relationships.  Status:    2/5/2024    Intervention(s)  Therapist will teach behavioral activation, sleep hygiene, CBT, DBT and ACT skills, proactive communication, mindfulness, grounding skills to support mood improvement and anxiety reduction.    Patient has reviewed and agreed to the above plan.      DEE Walden,  LICSW 2/5/2024      Answers for HPI/ROS submitted by the patient on 8/16/2022  If you checked off any problems, how difficult have these problems made it for you to do your work, take care of things at home, or get along with other people?: Somewhat difficult  PHQ9 TOTAL SCORE: 8  AYDIN 7 TOTAL SCORE: 12

## 2024-04-22 ENCOUNTER — VIRTUAL VISIT (OUTPATIENT)
Dept: PSYCHOLOGY | Facility: CLINIC | Age: 29
End: 2024-04-22
Payer: COMMERCIAL

## 2024-04-22 DIAGNOSIS — F33.0 MAJOR DEPRESSIVE DISORDER, RECURRENT EPISODE, MILD WITH ANXIOUS DISTRESS (H): Primary | ICD-10-CM

## 2024-04-22 PROCEDURE — 90837 PSYTX W PT 60 MINUTES: CPT | Mod: 95

## 2024-04-22 NOTE — PROGRESS NOTES
[facility]                                     Progress Note    Patient Name: Joey Spears  Date:   4/22/2024        Service Type: Individual      Session Start Time: 1:00 pm  Session End Time: 1:53 pm     Session Length: 53 min    Session #: 48    Attendees: Client attended alone    Service Modality:    Telemedicine Visit: The patient's condition can be safely assessed and treated via synchronous audio and visual telemedicine encounter.      Reason for Telemedicine Visit: Patient has requested telehealth visit    Originating Site (Patient Location): Patient's home    Distant Site (Provider Location): Provider Remote Setting- Home Office    Consent:  The patient/guardian has verbally consented to: the potential risks and benefits of telemedicine (video visit) versus in person care; bill my insurance or make self-payment for services provided; and responsibility for payment of non-covered services.     Mode of Communication:  Video Conference via [employer]    As the provider I attest to compliance with applicable laws and regulations related to telemedicine.    DATA  Interactive Complexity: No  Crisis: No      Progress Since Last Session (Related to Symptoms / Goals / Homework):  Symptoms:  inconsistent due to situational stressors    Homework: Achieved / completed to satisfaction      Episode of Care Goals: Satisfactory progress - ACTION (Actively working towards change); Intervened by reinforcing change plan / affirming steps taken     Current / Ongoing Stressors and Concerns:  Improving work stability. Partner job loss, MH dx, upcoming new job. Focus on boundaries, self care.   Ongoing No contact with mom, managing contact with stepdad. Continuing desire to focus on positive-productive relationships/activities that support wellness, learning to prioritize personal needs.       Treatment Objective(s) Addressed in This Session:   Use boundaries and proactive communication in  relationships.    Improve quantity and quality of night time sleep / decrease daytime naps   Identify negative self-talk and behaviors: challenge core beliefs, myths, and actions    Interventions:  Supportive Therapy: Provided active listening and validation. Reflected on distress management  skills  Motivational Interviewing  Target Behavior: continue using and augmenting   boundaries, proactive communication skills to get needs met , continue no contact with mom, focusing on healthy and supportive relationships.   Stage of Change: ACTION (Actively working towards change)     MI Intervention: Expressed Empathy/Understanding, Supported Autonomy, Collaboration, Evocation, Open-ended questions, Reflections: simple and complex, and Change talk (evoked)     Change Talk Expressed by the Patient: Ability to change Reasons to change Need to change Committment to change Activation Taking steps    Provider Response to Change Talk: E - Evoked more info from patient about behavior change, A - Affirmed patient's thoughts, decisions, or attempts at behavior change, and R - Reflected patient's change talk     Assessments completed prior to visit: 2/15/21 DA  The following assessments were completed by patient for this visit:      PHQ9:       2/27/2023     9:47 AM 3/13/2023     8:50 AM 5/22/2023     9:33 AM 6/5/2023     8:29 AM 7/24/2023     8:44 AM 8/10/2023     2:04 PM 8/28/2023     8:17 AM   PHQ-9 SCORE   PHQ-9 Total Score MyChart 5 (Mild depression) 6 (Mild depression) 7 (Mild depression) 6 (Mild depression) 11 (Moderate depression) 10 (Moderate depression) 8 (Mild depression)   PHQ-9 Total Score 5 6 7 6 11 10 8     GAD7:       10/25/2022     3:57 AM 11/14/2022     8:40 AM 11/28/2022     8:14 AM 5/22/2023     9:34 AM 6/5/2023     8:30 AM 7/24/2023     8:45 AM 8/28/2023     8:19 AM   AYDIN-7 SCORE   Total Score 7 (mild anxiety) 7 (mild anxiety) 11 (moderate anxiety) 10 (moderate anxiety) 8 (mild anxiety) 13 (moderate anxiety) 15  (severe anxiety)   Total Score 7 7 11 10 8 13 15     PROMIS 10-Global Health (only subscores and total score):       6/29/2022     4:37 AM 10/3/2022     7:49 AM 10/25/2022     3:59 AM 2/6/2023     9:41 AM 2/20/2023     9:13 AM 5/22/2023     9:35 AM 6/5/2023     8:31 AM   PROMIS-10 Scores Only   Global Mental Health Score 12 12 13 13 13 13 13   Global Physical Health Score 17 18 17 18 17 16 17   PROMIS TOTAL - SUBSCORES 29 30 30 31 30 29 30         ASSESSMENT: Current Emotional / Mental Status (status of significant symptoms):   Risk status (Self / Other harm or suicidal ideation)   Patient denies current fears or concerns for personal safety.   Patient denies current or recent suicidal ideation or behaviors.   Patient denies current or recent homicidal ideation or behaviors.   Patient denies current or recent self injurious behavior or ideation.   Patient denies other safety concerns.   Patient reports there has been no change in risk factors since their last session.     Patient reports there has been no change in protective factors since their last session.     Recommended that patient call 911 or go to the local ED should there be a change in any of these risk factors.     Appearance:   Appropriate    Eye Contact:   Good    Psychomotor Behavior: Normal    Attitude:   Cooperative  Friendly   Orientation:   All   Speech    Rate / Production: Normal     Volume:  Normal    Mood:    Anxious  Normal,   Affect:    Appropriate    Thought Content:  Clear    Thought Form:  Coherent  Goal Directed  Logical    Insight:    Good      Medication Review:   No changes to current psychiatric medication(s)     Medication Compliance:   Yes     Changes in Health Issues:   None noted       Chemical Use Review:   Substance Use: Chemical use reviewed, no active concerns identified      Tobacco Use: No current tobacco use.      Diagnosis:  1. Major depressive disorder, recurrent episode, mild with anxious distress (H24)      Collateral  Reports Completed:   Not Applicable    PLAN: (Patient Tasks / Therapist Tasks / Other)  Continue using PATRIZIA, FAST and Check the Facts engage intentionally with supportive people, prioritize family/friend  time. Maintain no communication with mother, boundary with stepdad. Continue self care      DEE Walden [facility] 4/22/2024        _____________________________________________________________  Individual Treatment Plan    Patient's Name: Joey Spears  YOB: 1995    Date of Creation:   9/13/2021  Date Treatment Plan Last Reviewed/Revised: 2/5/2024    There has been demonstrated improvement in functioning while patient has been engaged in psychotherapy/psychological service- if withdrawn the patient would deteriorate and/or relapse.     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    Anticipated number of session for this episode of care:   Anticipation frequency of session: Every other week  Anticipated Duration of each session: 38-52 minutes  Treatment plan will be reviewed in 90 days or when goals have been changed.     MeasurableTreatment Goal(s) related to diagnosis / functional impairment(s)  Goal 1: Client will experience an improvement in mood and anxiety evidenced by self report and AYDIN/PHQ scores of 5 or less    I will know I've met my goal when I'm enjoying my life more (paraphrase) .      Objective #A (Client Action)    Client will Increase interest, engagement, and pleasure in doing things  Decrease frequency and intensity of feeling down, depressed, hopeless  Improve quantity and quality of night time sleep / decrease daytime  naps  Feel less tired and more energy during the day   Identify negative self-talk and behaviors: challenge core beliefs, myths, and actions  Improve concentration, focus, and mindfulness in daily activities   Feel less fidgety, restless or slow in daily activities / interpersonal interactions  Use boundaries and proactive communication in relationships.  Status:    2/5/2024    Intervention(s)  Therapist will teach behavioral activation, sleep hygiene, CBT, DBT and ACT skills, proactive communication, mindfulness, grounding skills to support mood improvement and anxiety reduction.    Patient has reviewed and agreed to the above plan.      DEE Walden,  LICSW 2/5/2024      Answers for HPI/ROS submitted by the patient on 8/16/2022  If you checked off any problems, how difficult have these problems made it for you to do your work, take care of things at home, or get along with other people?: Somewhat difficult  PHQ9 TOTAL SCORE: 8  AYDIN 7 TOTAL SCORE: 12

## 2024-05-11 ENCOUNTER — HEALTH MAINTENANCE LETTER (OUTPATIENT)
Age: 29
End: 2024-05-11

## 2024-05-20 ENCOUNTER — VIRTUAL VISIT (OUTPATIENT)
Dept: PSYCHOLOGY | Facility: CLINIC | Age: 29
End: 2024-05-20
Payer: COMMERCIAL

## 2024-05-20 DIAGNOSIS — F33.0 MAJOR DEPRESSIVE DISORDER, RECURRENT EPISODE, MILD WITH ANXIOUS DISTRESS (H): Primary | ICD-10-CM

## 2024-05-20 PROCEDURE — 90837 PSYTX W PT 60 MINUTES: CPT | Mod: 95

## 2024-05-20 NOTE — PROGRESS NOTES
[facility]                                     Progress Note    Patient Name: Joey Spears  Date:   5/20/2024        Service Type: Individual      Session Start Time: 1:00 pm  Session End Time: 1:53 pm     Session Length: 53 min    Session #: 49    Attendees: Client attended alone    Service Modality:    Telemedicine Visit: The patient's condition can be safely assessed and treated via synchronous audio and visual telemedicine encounter.      Reason for Telemedicine Visit: Patient has requested telehealth visit    Originating Site (Patient Location): Patient's home    Distant Site (Provider Location): Provider Remote Setting- Home Office    Consent:  The patient/guardian has verbally consented to: the potential risks and benefits of telemedicine (video visit) versus in person care; bill my insurance or make self-payment for services provided; and responsibility for payment of non-covered services.     Mode of Communication:  Video Conference via [employer]    As the provider I attest to compliance with applicable laws and regulations related to telemedicine.    DATA  Interactive Complexity: No  Crisis: No      Progress Since Last Session (Related to Symptoms / Goals / Homework):  Symptoms:  inconsistent due to situational stressors    Homework: Achieved / completed to satisfaction      Episode of Care Goals: Satisfactory progress - ACTION (Actively working towards change); Intervened by reinforcing change plan / affirming steps taken     Current / Ongoing Stressors and Concerns:  Improving work stability. Partner new job, MH dx. Focus on boundaries, self care to support wellbeing at work/in personal life.   Ongoing No contact with mom, managing contact with stepdad. Continuing desire to focus on positive-productive relationships/activities that support wellness, learning to prioritize personal needs.       Treatment Objective(s) Addressed in This Session:   Use boundaries and  proactive communication in relationships.    Improve quantity and quality of night time sleep / decrease daytime naps   Identify negative self-talk and behaviors: challenge core beliefs, myths, and actions    Interventions:  Supportive Therapy: Provided active listening and validation. Reflected on distress management  skills, active use of boundaries, limits and getting needs met  Motivational Interviewing  Target Behavior: continue using and augmenting   boundaries, proactive communication skills to get needs met , continue no contact with mom, focusing on healthy and supportive relationships.   Stage of Change: ACTION (Actively working towards change)     MI Intervention: Expressed Empathy/Understanding, Supported Autonomy, Collaboration, Evocation, Open-ended questions, Reflections: simple and complex, and Change talk (evoked)     Change Talk Expressed by the Patient: Ability to change Reasons to change Need to change Committment to change Activation Taking steps    Provider Response to Change Talk: E - Evoked more info from patient about behavior change, A - Affirmed patient's thoughts, decisions, or attempts at behavior change, and R - Reflected patient's change talk     Assessments completed prior to visit: 2/15/21 DA  The following assessments were completed by patient for this visit:      PHQ9:       2/27/2023     9:47 AM 3/13/2023     8:50 AM 5/22/2023     9:33 AM 6/5/2023     8:29 AM 7/24/2023     8:44 AM 8/10/2023     2:04 PM 8/28/2023     8:17 AM   PHQ-9 SCORE   PHQ-9 Total Score MyChart 5 (Mild depression) 6 (Mild depression) 7 (Mild depression) 6 (Mild depression) 11 (Moderate depression) 10 (Moderate depression) 8 (Mild depression)   PHQ-9 Total Score 5 6 7 6 11 10 8     GAD7:       10/25/2022     3:57 AM 11/14/2022     8:40 AM 11/28/2022     8:14 AM 5/22/2023     9:34 AM 6/5/2023     8:30 AM 7/24/2023     8:45 AM 8/28/2023     8:19 AM   AYDIN-7 SCORE   Total Score 7 (mild anxiety) 7 (mild anxiety) 11  (moderate anxiety) 10 (moderate anxiety) 8 (mild anxiety) 13 (moderate anxiety) 15 (severe anxiety)   Total Score 7 7 11 10 8 13 15     PROMIS 10-Global Health (only subscores and total score):       6/29/2022     4:37 AM 10/3/2022     7:49 AM 10/25/2022     3:59 AM 2/6/2023     9:41 AM 2/20/2023     9:13 AM 5/22/2023     9:35 AM 6/5/2023     8:31 AM   PROMIS-10 Scores Only   Global Mental Health Score 12 12 13 13 13 13 13   Global Physical Health Score 17 18 17 18 17 16 17   PROMIS TOTAL - SUBSCORES 29 30 30 31 30 29 30         ASSESSMENT: Current Emotional / Mental Status (status of significant symptoms):   Risk status (Self / Other harm or suicidal ideation)   Patient denies current fears or concerns for personal safety.   Patient denies current or recent suicidal ideation or behaviors.   Patient denies current or recent homicidal ideation or behaviors.   Patient denies current or recent self injurious behavior or ideation.   Patient denies other safety concerns.   Patient reports there has been no change in risk factors since their last session.     Patient reports there has been no change in protective factors since their last session.     Recommended that patient call 911 or go to the local ED should there be a change in any of these risk factors.     Appearance:   Appropriate    Eye Contact:   Good    Psychomotor Behavior: Normal    Attitude:   Cooperative  Friendly   Orientation:   All   Speech    Rate / Production: Normal     Volume:  Normal    Mood:    Anxious  Normal,   Affect:    Appropriate    Thought Content:  Clear    Thought Form:  Coherent  Goal Directed  Logical    Insight:    Good      Medication Review:   No changes to current psychiatric medication(s)     Medication Compliance:   Yes     Changes in Health Issues:   None noted       Chemical Use Review:   Substance Use: Chemical use reviewed, no active concerns identified      Tobacco Use: No current tobacco use.      Diagnosis:  1. Major  depressive disorder, recurrent episode, mild with anxious distress (H24)      Collateral Reports Completed:   Not Applicable    PLAN: (Patient Tasks / Therapist Tasks / Other)  Continue using PATRIZIA, FAST and Check the Facts engage intentionally with supportive people, prioritize family/friend  time. Maintain no communication with mother, boundary with stepdad. Continue self care      DEE Walden LICSW 5/20/2024        _____________________________________________________________  Individual Treatment Plan    Patient's Name: Joey Spears  YOB: 1995    Date of Creation:   9/13/2021  Date Treatment Plan Last Reviewed/Revised: 2/5/2024    There has been demonstrated improvement in functioning while patient has been engaged in psychotherapy/psychological service- if withdrawn the patient would deteriorate and/or relapse.     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    Anticipated number of session for this episode of care:   Anticipation frequency of session: Every other week  Anticipated Duration of each session: 38-52 minutes  Treatment plan will be reviewed in 90 days or when goals have been changed.     MeasurableTreatment Goal(s) related to diagnosis / functional impairment(s)  Goal 1: Client will experience an improvement in mood and anxiety evidenced by self report and ADYIN/PHQ scores of 5 or less    I will know I've met my goal when I'm enjoying my life more (paraphrase) .      Objective #A (Client Action)    Client will Increase interest, engagement, and pleasure in doing things  Decrease frequency and intensity of feeling down,  depressed, hopeless  Improve quantity and quality of night time sleep / decrease daytime naps  Feel less tired and more energy during the day   Identify negative self-talk and behaviors: challenge core beliefs, myths, and actions  Improve concentration, focus, and mindfulness in daily activities   Feel less fidgety, restless or slow in daily activities / interpersonal interactions  Use boundaries and proactive communication in relationships.  Status:    2/5/2024    Intervention(s)  Therapist will teach behavioral activation, sleep hygiene, CBT, DBT and ACT skills, proactive communication, mindfulness, grounding skills to support mood improvement and anxiety reduction.    Patient has reviewed and agreed to the above plan.      DEE Walden,  LICSW 2/5/2024      Answers for HPI/ROS submitted by the patient on 8/16/2022  If you checked off any problems, how difficult have these problems made it for you to do your work, take care of things at home, or get along with other people?: Somewhat difficult  PHQ9 TOTAL SCORE: 8  AYDIN 7 TOTAL SCORE: 12

## 2024-06-03 ENCOUNTER — VIRTUAL VISIT (OUTPATIENT)
Dept: PSYCHOLOGY | Facility: CLINIC | Age: 29
End: 2024-06-03
Payer: COMMERCIAL

## 2024-06-03 DIAGNOSIS — F33.0 MAJOR DEPRESSIVE DISORDER, RECURRENT EPISODE, MILD WITH ANXIOUS DISTRESS (H): Primary | ICD-10-CM

## 2024-06-03 PROCEDURE — 90837 PSYTX W PT 60 MINUTES: CPT | Mod: 95

## 2024-06-03 NOTE — PROGRESS NOTES
[facility]                                     Progress Note    Patient Name: Joey Spears  Date:   6/3/2024        Service Type: Individual      Session Start Time: 2:00 pm  Session End Time: 2:53 pm     Session Length: 53 min    Session #: 50    Attendees: Client attended alone    Service Modality:    Telemedicine Visit: The patient's condition can be safely assessed and treated via synchronous audio and visual telemedicine encounter.      Reason for Telemedicine Visit: Patient has requested telehealth visit    Originating Site (Patient Location): Patient's home    Distant Site (Provider Location): Provider Remote Setting- Home Office    Consent:  The patient/guardian has verbally consented to: the potential risks and benefits of telemedicine (video visit) versus in person care; bill my insurance or make self-payment for services provided; and responsibility for payment of non-covered services.     Mode of Communication:  Video Conference via [facility]    As the provider I attest to compliance with applicable laws and regulations related to telemedicine.    DATA  Interactive Complexity: No  Crisis: No      Progress Since Last Session (Related to Symptoms / Goals / Homework):  Symptoms:  inconsistent due to situational stressors    Homework: Achieved / completed to satisfaction      Episode of Care Goals: Satisfactory progress - ACTION (Actively working towards change); Intervened by reinforcing change plan / affirming steps taken     Current / Ongoing Stressors and Concerns:  Sister hospitalized for eating disorder. Improving work stability. Partner new job, MH dx. Focus on boundaries, self care to support wellbeing at work/in personal life.   Ongoing No contact with mom, managing contact with stepdad. Continuing desire to focus on positive-productive relationships/activities that support wellness, learning to prioritize personal needs.       Treatment Objective(s) Addressed in  This Session:   Use boundaries and proactive communication in relationships.    Improve quantity and quality of night time sleep / decrease daytime naps   Identify negative self-talk and behaviors: challenge core beliefs, myths, and actions  Improve concentration, focus, and mindfulness in daily activities   Feel less fidgety, restless or slow in daily activities / interpersonal interactions      Interventions:  Supportive Therapy: Provided active listening and validation. Reflected on distress management  skills, active use of boundaries, limits and getting needs met, prioritizing personal needs/self care  Motivational Interviewing  Target Behavior: continue using and augmenting   boundaries, proactive communication skills to get needs met , continue no contact with mom, focusing on healthy and supportive relationships.   Stage of Change: ACTION (Actively working towards change)     MI Intervention: Expressed Empathy/Understanding, Supported Autonomy, Collaboration, Evocation, Open-ended questions, Reflections: simple and complex, and Change talk (evoked)     Change Talk Expressed by the Patient: Ability to change Reasons to change Need to change Committment to change Activation Taking steps    Provider Response to Change Talk: E - Evoked more info from patient about behavior change, A - Affirmed patient's thoughts, decisions, or attempts at behavior change, and R - Reflected patient's change talk     Assessments completed prior to visit: 2/15/21 DA  The following assessments were completed by patient for this visit:      PHQ9:       2/27/2023     9:47 AM 3/13/2023     8:50 AM 5/22/2023     9:33 AM 6/5/2023     8:29 AM 7/24/2023     8:44 AM 8/10/2023     2:04 PM 8/28/2023     8:17 AM   PHQ-9 SCORE   PHQ-9 Total Score Joe 5 (Mild depression) 6 (Mild depression) 7 (Mild depression) 6 (Mild depression) 11 (Moderate depression) 10 (Moderate depression) 8 (Mild depression)   PHQ-9 Total Score 5 6 7 6 11 10 8      GAD7:       10/25/2022     3:57 AM 11/14/2022     8:40 AM 11/28/2022     8:14 AM 5/22/2023     9:34 AM 6/5/2023     8:30 AM 7/24/2023     8:45 AM 8/28/2023     8:19 AM   AYDIN-7 SCORE   Total Score 7 (mild anxiety) 7 (mild anxiety) 11 (moderate anxiety) 10 (moderate anxiety) 8 (mild anxiety) 13 (moderate anxiety) 15 (severe anxiety)   Total Score 7 7 11 10 8 13 15     PROMIS 10-Global Health (only subscores and total score):       6/29/2022     4:37 AM 10/3/2022     7:49 AM 10/25/2022     3:59 AM 2/6/2023     9:41 AM 2/20/2023     9:13 AM 5/22/2023     9:35 AM 6/5/2023     8:31 AM   PROMIS-10 Scores Only   Global Mental Health Score 12 12 13 13 13 13 13   Global Physical Health Score 17 18 17 18 17 16 17   PROMIS TOTAL - SUBSCORES 29 30 30 31 30 29 30         ASSESSMENT: Current Emotional / Mental Status (status of significant symptoms):   Risk status (Self / Other harm or suicidal ideation)   Patient denies current fears or concerns for personal safety.   Patient denies current or recent suicidal ideation or behaviors.   Patient denies current or recent homicidal ideation or behaviors.   Patient denies current or recent self injurious behavior or ideation.   Patient denies other safety concerns.   Patient reports there has been no change in risk factors since their last session.     Patient reports there has been no change in protective factors since their last session.     Recommended that patient call 911 or go to the local ED should there be a change in any of these risk factors.     Appearance:   Appropriate    Eye Contact:   Good    Psychomotor Behavior: Normal    Attitude:   Cooperative  Friendly   Orientation:   All   Speech    Rate / Production: Normal     Volume:  Normal    Mood:    Anxious  Normal,   Affect:    Appropriate    Thought Content:  Clear    Thought Form:  Coherent  Goal Directed  Logical    Insight:    Good      Medication Review:   No changes to current psychiatric  medication(s)     Medication Compliance:   Yes     Changes in Health Issues:   None noted       Chemical Use Review:   Substance Use: Chemical use reviewed, no active concerns identified      Tobacco Use: No current tobacco use.      Diagnosis:  1. Major depressive disorder, recurrent episode, mild with anxious distress (H24)      Collateral Reports Completed:   Not Applicable    PLAN: (Patient Tasks / Therapist Tasks / Other)  Continue using PATRIZIA, FAST and Check the Facts engage intentionally with supportive people, prioritize family/friend  time. Maintain no communication with mother, boundary with stepdad. Continue self care      DEE Walden [facility] 6/3/2024        _____________________________________________________________  Individual Treatment Plan    Patient's Name: Joey Spears  YOB: 1995    Date of Creation:   9/13/2021  Date Treatment Plan Last Reviewed/Revised: 6/3/24    There has been demonstrated improvement in functioning while patient has been engaged in psychotherapy/psychological service- if withdrawn the patient would deteriorate and/or relapse.     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    Anticipated number of session for this episode of care:   Anticipation frequency of session: Every other week  Anticipated Duration of each session: 38-52 minutes  Treatment plan will be reviewed in 90 days or when goals have been changed.     MeasurableTreatment Goal(s) related to diagnosis / functional impairment(s)  Goal 1: Client will experience an improvement in mood and anxiety evidenced by self report and AYDIN/PHQ scores  of 5 or less    I will know I've met my goal when I'm enjoying my life more (paraphrase) .      Objective #A (Client Action)    Client will Increase interest, engagement, and pleasure in doing things  Decrease frequency and intensity of feeling down, depressed, hopeless  Improve quantity and quality of night time sleep / decrease daytime naps  Feel less tired and more energy during the day   Identify negative self-talk and behaviors: challenge core beliefs, myths, and actions  Improve concentration, focus, and mindfulness in daily activities   Feel less fidgety, restless or slow in daily activities / interpersonal interactions  Use boundaries and proactive communication in relationships.  Status:    2/5/2024    Intervention(s)  Therapist will teach behavioral activation, sleep hygiene, CBT, DBT and ACT skills, proactive communication, mindfulness, grounding skills to support mood improvement and anxiety reduction.    Patient has reviewed and agreed to the above plan.      DEE Walden,  LICSW 2/5/2024      Answers for HPI/ROS submitted by the patient on 8/16/2022  If you checked off any problems, how difficult have these problems made it for you to do your work, take care of things at home, or get along with other people?: Somewhat difficult  PHQ9 TOTAL SCORE: 8  AYDIN 7 TOTAL SCORE: 12

## 2024-06-04 PROCEDURE — 99207 PR NO CHARGE LOS: CPT

## 2024-07-01 ENCOUNTER — VIRTUAL VISIT (OUTPATIENT)
Dept: PSYCHOLOGY | Facility: CLINIC | Age: 29
End: 2024-07-01
Payer: COMMERCIAL

## 2024-07-01 DIAGNOSIS — F33.0 MAJOR DEPRESSIVE DISORDER, RECURRENT EPISODE, MILD WITH ANXIOUS DISTRESS (H): Primary | ICD-10-CM

## 2024-07-01 PROCEDURE — 90837 PSYTX W PT 60 MINUTES: CPT | Mod: 95

## 2024-07-01 NOTE — PROGRESS NOTES
[facility]                                     Progress Note    Patient Name: Joey Spears  Date:   7/1/2024        Service Type: Individual      Session Start Time: 1:05 pm  Session End Time: 1:58 pm     Session Length: 53 min    Session #: 51    Attendees: Client attended alone    Service Modality:    Telemedicine Visit: The patient's condition can be safely assessed and treated via synchronous audio and visual telemedicine encounter.      Reason for Telemedicine Visit: Patient has requested telehealth visit    Originating Site (Patient Location): Patient's home    Distant Site (Provider Location): Provider Remote Setting- Home Office    Consent:  The patient/guardian has verbally consented to: the potential risks and benefits of telemedicine (video visit) versus in person care; bill my insurance or make self-payment for services provided; and responsibility for payment of non-covered services.     Mode of Communication:  Video Conference via [employer]    As the provider I attest to compliance with applicable laws and regulations related to telemedicine.    DATA  Interactive Complexity: No  Crisis: No      Progress Since Last Session (Related to Symptoms / Goals / Homework):  Symptoms:  inconsistent due to situational stressors    Homework: Achieved / completed to satisfaction      Episode of Care Goals: Satisfactory progress - ACTION (Actively working towards change); Intervened by reinforcing change plan / affirming steps taken     Current / Ongoing Stressors and Concerns:  Sister hospitalized for eating disorder. Improving work stability. Partner new job, MH dx. Focus on boundaries, self care to support wellbeing at work/in personal life.   Ongoing No contact with mom, managing contact with stepdad. Continuing desire to focus on positive-productive relationships/activities that support wellness, learning to prioritize personal needs.       Treatment Objective(s) Addressed in  This Session:   Use boundaries and proactive communication in relationships.    Improve quantity and quality of night time sleep / decrease daytime naps   Identify negative self-talk and behaviors: challenge core beliefs, myths, and actions  Improve concentration, focus, and mindfulness in daily activities   Feel less fidgety, restless or slow in daily activities / interpersonal interactions      Interventions:  Supportive Therapy: Provided active listening and validation. Reflected on distress tolerance skills, active use of boundaries, limits and getting needs met, prioritizing personal needs/self care, activities of enjoyment  Motivational Interviewing  Target Behavior: continue using and augmenting   boundaries, proactive communication skills to get needs met , continue no contact with mom, focusing on healthy and supportive relationships.   Stage of Change: ACTION (Actively working towards change)     MI Intervention: Expressed Empathy/Understanding, Supported Autonomy, Collaboration, Evocation, Open-ended questions, Reflections: simple and complex, and Change talk (evoked)     Change Talk Expressed by the Patient: Ability to change Reasons to change Need to change Committment to change Activation Taking steps    Provider Response to Change Talk: E - Evoked more info from patient about behavior change, A - Affirmed patient's thoughts, decisions, or attempts at behavior change, and R - Reflected patient's change talk     Assessments completed prior to visit: 2/15/21 DA  The following assessments were completed by patient for this visit:      PHQ9:       2/27/2023     9:47 AM 3/13/2023     8:50 AM 5/22/2023     9:33 AM 6/5/2023     8:29 AM 7/24/2023     8:44 AM 8/10/2023     2:04 PM 8/28/2023     8:17 AM   PHQ-9 SCORE   PHQ-9 Total Score [place] 5 (Mild depression) 6 (Mild depression) 7 (Mild depression) 6 (Mild depression) 11 (Moderate depression) 10 (Moderate depression) 8 (Mild depression)   PHQ-9 Total Score  5 6 7 6 11 10 8     GAD7:       10/25/2022     3:57 AM 11/14/2022     8:40 AM 11/28/2022     8:14 AM 5/22/2023     9:34 AM 6/5/2023     8:30 AM 7/24/2023     8:45 AM 8/28/2023     8:19 AM   AYDIN-7 SCORE   Total Score 7 (mild anxiety) 7 (mild anxiety) 11 (moderate anxiety) 10 (moderate anxiety) 8 (mild anxiety) 13 (moderate anxiety) 15 (severe anxiety)   Total Score 7 7 11 10 8 13 15     PROMIS 10-Global Health (only subscores and total score):       6/29/2022     4:37 AM 10/3/2022     7:49 AM 10/25/2022     3:59 AM 2/6/2023     9:41 AM 2/20/2023     9:13 AM 5/22/2023     9:35 AM 6/5/2023     8:31 AM   PROMIS-10 Scores Only   Global Mental Health Score 12 12 13 13 13 13 13   Global Physical Health Score 17 18 17 18 17 16 17   PROMIS TOTAL - SUBSCORES 29 30 30 31 30 29 30         ASSESSMENT: Current Emotional / Mental Status (status of significant symptoms):   Risk status (Self / Other harm or suicidal ideation)   Patient denies current fears or concerns for personal safety.   Patient denies current or recent suicidal ideation or behaviors.   Patient denies current or recent homicidal ideation or behaviors.   Patient denies current or recent self injurious behavior or ideation.   Patient denies other safety concerns.   Patient reports there has been no change in risk factors since their last session.     Patient reports there has been no change in protective factors since their last session.     Recommended that patient call 911 or go to the local ED should there be a change in any of these risk factors.     Appearance:   Appropriate    Eye Contact:   Good    Psychomotor Behavior: Normal    Attitude:   Cooperative  Friendly   Orientation:   All   Speech    Rate / Production: Normal     Volume:  Normal    Mood:    Anxious  Normal,   Affect:    Appropriate    Thought Content:  Clear    Thought Form:  Coherent  Goal Directed  Logical    Insight:    Good      Medication Review:   No changes to current psychiatric  medication(s)     Medication Compliance:   Yes     Changes in Health Issues:   None noted       Chemical Use Review:   Substance Use: Chemical use reviewed, no active concerns identified      Tobacco Use: No current tobacco use.      Diagnosis:  1. Major depressive disorder, recurrent episode, mild with anxious distress (H24)      Collateral Reports Completed:   Not Applicable    PLAN: (Patient Tasks / Therapist Tasks / Other)  Continue using PATRIZIA, FAST and Check the Facts engage intentionally with supportive people, prioritize family/friend  time. Maintain no communication with mother, boundary with stepdad. Continue self care      DEE Walden [facility] 7/1/2024        _____________________________________________________________  Individual Treatment Plan    Patient's Name: Joey Spears  YOB: 1995    Date of Creation:   9/13/2021  Date Treatment Plan Last Reviewed/Revised: 6/3/24    There has been demonstrated improvement in functioning while patient has been engaged in psychotherapy/psychological service- if withdrawn the patient would deteriorate and/or relapse.     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    Anticipated number of session for this episode of care:   Anticipation frequency of session: Every other week  Anticipated Duration of each session: 38-52 minutes  Treatment plan will be reviewed in 90 days or when goals have been changed.     MeasurableTreatment Goal(s) related to diagnosis / functional impairment(s)  Goal 1: Client will experience an improvement in mood and anxiety evidenced by self report and AYDIN/PHQ scores  of 5 or less    I will know I've met my goal when I'm enjoying my life more (paraphrase) .      Objective #A (Client Action)    Client will Increase interest, engagement, and pleasure in doing things  Decrease frequency and intensity of feeling down, depressed, hopeless  Improve quantity and quality of night time sleep / decrease daytime naps  Feel less tired and more energy during the day   Identify negative self-talk and behaviors: challenge core beliefs, myths, and actions  Improve concentration, focus, and mindfulness in daily activities   Feel less fidgety, restless or slow in daily activities / interpersonal interactions  Use boundaries and proactive communication in relationships.  Status:    2/5/2024    Intervention(s)  Therapist will teach behavioral activation, sleep hygiene, CBT, DBT and ACT skills, proactive communication, mindfulness, grounding skills to support mood improvement and anxiety reduction.    Patient has reviewed and agreed to the above plan.      DEE Walden,  LICSW 2/5/2024      Answers for HPI/ROS submitted by the patient on 8/16/2022  If you checked off any problems, how difficult have these problems made it for you to do your work, take care of things at home, or get along with other people?: Somewhat difficult  PHQ9 TOTAL SCORE: 8  AYDIN 7 TOTAL SCORE: 12

## 2024-07-15 ENCOUNTER — VIRTUAL VISIT (OUTPATIENT)
Dept: PSYCHOLOGY | Facility: CLINIC | Age: 29
End: 2024-07-15
Payer: COMMERCIAL

## 2024-07-15 DIAGNOSIS — F33.0 MAJOR DEPRESSIVE DISORDER, RECURRENT EPISODE, MILD WITH ANXIOUS DISTRESS (H): Primary | ICD-10-CM

## 2024-07-15 PROCEDURE — 90837 PSYTX W PT 60 MINUTES: CPT | Mod: 95

## 2024-07-15 NOTE — PROGRESS NOTES
[facility]                                     Progress Note    Patient Name: Joey Spears  Date:   7/15/2024        Service Type: Individual      Session Start Time: 1:05 pm  Session End Time: 1:58 pm     Session Length: 53 min    Session #: 52    Attendees: Client attended alone    Service Modality:    Telemedicine Visit: The patient's condition can be safely assessed and treated via synchronous audio and visual telemedicine encounter.      Reason for Telemedicine Visit: Patient has requested telehealth visit    Originating Site (Patient Location): Patient's home    Distant Site (Provider Location): Provider Remote Setting- Home Office    Consent:  The patient/guardian has verbally consented to: the potential risks and benefits of telemedicine (video visit) versus in person care; bill my insurance or make self-payment for services provided; and responsibility for payment of non-covered services.     Mode of Communication:  Video Conference via [employer]    As the provider I attest to compliance with applicable laws and regulations related to telemedicine.    DATA  Interactive Complexity: No  Crisis: No      Progress Since Last Session (Related to Symptoms / Goals / Homework):  Symptoms:  inconsistent due to situational stressors    Homework: Achieved / completed to satisfaction      Episode of Care Goals: Satisfactory progress - ACTION (Actively working towards change); Intervened by reinforcing change plan / affirming steps taken     Current / Ongoing Stressors and Concerns:  Prep for move. Sister hospitalized for eating disorder, 9 days to discharge. Improving work stability. Partner new job,  dx. Focus on boundaries, self care to support wellbeing at work/in personal life.   Ongoing No contact with mom, managing contact with stepdad. Continuing desire to focus on positive-productive relationships/activities that support wellness, learning to prioritize personal needs.        Treatment Objective(s) Addressed in This Session:   Use boundaries and proactive communication in relationships.    Improve quantity and quality of night time sleep / decrease daytime naps   Identify negative self-talk and behaviors: challenge core beliefs, myths, and actions  Improve concentration, focus, and mindfulness in daily activities   Feel less fidgety, restless or slow in daily activities / interpersonal interactions    Interventions:  Supportive Therapy: Provided active listening and validation. Surfaced need for balance in home responsibility, especially with move.   Motivational Interviewing  Target Behavior: continue using and augmenting   boundaries, proactive communication skills to get needs met , continue no contact with mom, focusing on healthy and supportive relationships, creating personal time and space.   Stage of Change: ACTION (Actively working towards change)     MI Intervention: Expressed Empathy/Understanding, Supported Autonomy, Collaboration, Evocation, Open-ended questions, Reflections: simple and complex, and Change talk (evoked)     Change Talk Expressed by the Patient: Ability to change Reasons to change Need to change Committment to change Activation Taking steps    Provider Response to Change Talk: E - Evoked more info from patient about behavior change, A - Affirmed patient's thoughts, decisions, or attempts at behavior change, and R - Reflected patient's change talk     Assessments completed prior to visit: 2/15/21 DA  The following assessments were completed by patient for this visit:      PHQ9:       2/27/2023     9:47 AM 3/13/2023     8:50 AM 5/22/2023     9:33 AM 6/5/2023     8:29 AM 7/24/2023     8:44 AM 8/10/2023     2:04 PM 8/28/2023     8:17 AM   PHQ-9 SCORE   PHQ-9 Total Score Joe 5 (Mild depression) 6 (Mild depression) 7 (Mild depression) 6 (Mild depression) 11 (Moderate depression) 10 (Moderate depression) 8 (Mild depression)   PHQ-9 Total Score 5 6 7 6 11  10 8     GAD7:       10/25/2022     3:57 AM 11/14/2022     8:40 AM 11/28/2022     8:14 AM 5/22/2023     9:34 AM 6/5/2023     8:30 AM 7/24/2023     8:45 AM 8/28/2023     8:19 AM   AYDIN-7 SCORE   Total Score 7 (mild anxiety) 7 (mild anxiety) 11 (moderate anxiety) 10 (moderate anxiety) 8 (mild anxiety) 13 (moderate anxiety) 15 (severe anxiety)   Total Score 7 7 11 10 8 13 15     PROMIS 10-Global Health (only subscores and total score):       6/29/2022     4:37 AM 10/3/2022     7:49 AM 10/25/2022     3:59 AM 2/6/2023     9:41 AM 2/20/2023     9:13 AM 5/22/2023     9:35 AM 6/5/2023     8:31 AM   PROMIS-10 Scores Only   Global Mental Health Score 12 12 13 13 13 13 13   Global Physical Health Score 17 18 17 18 17 16 17   PROMIS TOTAL - SUBSCORES 29 30 30 31 30 29 30         ASSESSMENT: Current Emotional / Mental Status (status of significant symptoms):   Risk status (Self / Other harm or suicidal ideation)   Patient denies current fears or concerns for personal safety.   Patient denies current or recent suicidal ideation or behaviors.   Patient denies current or recent homicidal ideation or behaviors.   Patient denies current or recent self injurious behavior or ideation.   Patient denies other safety concerns.   Patient reports there has been no change in risk factors since their last session.     Patient reports there has been no change in protective factors since their last session.     Recommended that patient call 911 or go to the local ED should there be a change in any of these risk factors.     Appearance:   Appropriate    Eye Contact:   Good    Psychomotor Behavior: Normal    Attitude:   Cooperative  Friendly   Orientation:   All   Speech    Rate / Production: Normal     Volume:  Normal    Mood:    Anxious  Normal,   Affect:    Appropriate    Thought Content:  Clear    Thought Form:  Coherent  Goal Directed  Logical    Insight:    Good      Medication Review:   No changes to current psychiatric  medication(s)     Medication Compliance:   Yes     Changes in Health Issues:   None noted       Chemical Use Review:   Substance Use: Chemical use reviewed, no active concerns identified      Tobacco Use: No current tobacco use.      Diagnosis:  1. Major depressive disorder, recurrent episode, mild with anxious distress (H24)      Collateral Reports Completed:   Not Applicable    PLAN: (Patient Tasks / Therapist Tasks / Other)  Continue using PATRIZIA, FAST and Check the Facts engage intentionally with supportive people, prioritize family/friend  time. Maintain no communication with mother, boundary with stepdad. Continue self care      DEE Walden [facility] 7/15/2024        _____________________________________________________________  Individual Treatment Plan    Patient's Name: Joey Spears  YOB: 1995    Date of Creation:   9/13/2021  Date Treatment Plan Last Reviewed/Revised: 6/3/24    There has been demonstrated improvement in functioning while patient has been engaged in psychotherapy/psychological service- if withdrawn the patient would deteriorate and/or relapse.     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    Anticipated number of session for this episode of care:   Anticipation frequency of session: Every other week  Anticipated Duration of each session: 38-52 minutes  Treatment plan will be reviewed in 90 days or when goals have been changed.     MeasurableTreatment Goal(s) related to diagnosis / functional impairment(s)  Goal 1: Client will experience an improvement in mood and anxiety evidenced by self report and AYDIN/PHQ scores  of 5 or less    I will know I've met my goal when I'm enjoying my life more (paraphrase) .      Objective #A (Client Action)    Client will Increase interest, engagement, and pleasure in doing things  Decrease frequency and intensity of feeling down, depressed, hopeless  Improve quantity and quality of night time sleep / decrease daytime naps  Feel less tired and more energy during the day   Identify negative self-talk and behaviors: challenge core beliefs, myths, and actions  Improve concentration, focus, and mindfulness in daily activities   Feel less fidgety, restless or slow in daily activities / interpersonal interactions  Use boundaries and proactive communication in relationships.  Status:    2/5/2024    Intervention(s)  Therapist will teach behavioral activation, sleep hygiene, CBT, DBT and ACT skills, proactive communication, mindfulness, grounding skills to support mood improvement and anxiety reduction.    Patient has reviewed and agreed to the above plan.      DEE Walden,  LICSW 2/5/2024      Answers for HPI/ROS submitted by the patient on 8/16/2022  If you checked off any problems, how difficult have these problems made it for you to do your work, take care of things at home, or get along with other people?: Somewhat difficult  PHQ9 TOTAL SCORE: 8  AYDIN 7 TOTAL SCORE: 12

## 2024-07-29 ENCOUNTER — VIRTUAL VISIT (OUTPATIENT)
Dept: PSYCHOLOGY | Facility: CLINIC | Age: 29
End: 2024-07-29
Payer: COMMERCIAL

## 2024-07-29 DIAGNOSIS — F33.0 MAJOR DEPRESSIVE DISORDER, RECURRENT EPISODE, MILD WITH ANXIOUS DISTRESS (H): Primary | ICD-10-CM

## 2024-07-29 PROCEDURE — 90837 PSYTX W PT 60 MINUTES: CPT | Mod: 95

## 2024-07-29 NOTE — PROGRESS NOTES
[facility]                                     Progress Note    Patient Name: Joey Spears  Date:   7/29/2024        Service Type: Individual      Session Start Time: 10:02 pm  Session End Time: 10:55 am     Session Length: 53 min    Session #: 53    Attendees: Client attended alone    Service Modality:    Telemedicine Visit: The patient's condition can be safely assessed and treated via synchronous audio and visual telemedicine encounter.      Reason for Telemedicine Visit: Patient has requested telehealth visit    Originating Site (Patient Location): Patient's home    Distant Site (Provider Location): Provider Remote Setting- Home Office    Consent:  The patient/guardian has verbally consented to: the potential risks and benefits of telemedicine (video visit) versus in person care; bill my insurance or make self-payment for services provided; and responsibility for payment of non-covered services.     Mode of Communication:  Video Conference via [employer]    As the provider I attest to compliance with applicable laws and regulations related to telemedicine.    DATA  Interactive Complexity: No  Crisis: No      Progress Since Last Session (Related to Symptoms / Goals / Homework):  Symptoms:  Improving reducing situational stressors    Homework: Achieved / completed to satisfaction      Episode of Care Goals: Satisfactory progress - ACTION (Actively working towards change); Intervened by reinforcing change plan / affirming steps taken     Current / Ongoing Stressors and Concerns:  Completed move. Sister discharged from IP eating disorder. Improving work stability, financial stability Partner new job. Focus on boundaries, self care to support wellbeing at work/in personal life.   Ongoing No contact with mom, managing contact with stepdad. Continuing desire to focus on positive-productive relationships/activities that support wellness, learning to prioritize personal needs.        Treatment Objective(s) Addressed in This Session:   Use boundaries and proactive communication in relationships.    Improve quantity and quality of night time sleep / decrease daytime naps   Identify negative self-talk and behaviors: challenge core beliefs, myths, and actions  Improve concentration, focus, and mindfulness in daily activities   Feel less fidgety, restless or slow in daily activities / interpersonal interactions    Interventions:  Supportive Therapy: Provided active listening and validation. Surfaced need for balance in home responsibility, especially with move.   Motivational Interviewing  Target Behavior: continue using and augmenting   boundaries, proactive communication skills to get needs met , continue no contact with mom, focusing on healthy and supportive relationships, creating personal time and space.   Stage of Change: ACTION (Actively working towards change)     MI Intervention: Expressed Empathy/Understanding, Supported Autonomy, Collaboration, Evocation, Open-ended questions, Reflections: simple and complex, and Change talk (evoked)     Change Talk Expressed by the Patient: Ability to change Reasons to change Need to change Committment to change Activation Taking steps    Provider Response to Change Talk: E - Evoked more info from patient about behavior change, A - Affirmed patient's thoughts, decisions, or attempts at behavior change, and R - Reflected patient's change talk     Assessments completed prior to visit: 2/15/21 DA  The following assessments were completed by patient for this visit:      PHQ9:       2/27/2023     9:47 AM 3/13/2023     8:50 AM 5/22/2023     9:33 AM 6/5/2023     8:29 AM 7/24/2023     8:44 AM 8/10/2023     2:04 PM 8/28/2023     8:17 AM   PHQ-9 SCORE   PHQ-9 Total Score Joe 5 (Mild depression) 6 (Mild depression) 7 (Mild depression) 6 (Mild depression) 11 (Moderate depression) 10 (Moderate depression) 8 (Mild depression)   PHQ-9 Total Score 5 6 7 6 11  10 8     GAD7:       10/25/2022     3:57 AM 11/14/2022     8:40 AM 11/28/2022     8:14 AM 5/22/2023     9:34 AM 6/5/2023     8:30 AM 7/24/2023     8:45 AM 8/28/2023     8:19 AM   AYDIN-7 SCORE   Total Score 7 (mild anxiety) 7 (mild anxiety) 11 (moderate anxiety) 10 (moderate anxiety) 8 (mild anxiety) 13 (moderate anxiety) 15 (severe anxiety)   Total Score 7 7 11 10 8 13 15     PROMIS 10-Global Health (only subscores and total score):       6/29/2022     4:37 AM 10/3/2022     7:49 AM 10/25/2022     3:59 AM 2/6/2023     9:41 AM 2/20/2023     9:13 AM 5/22/2023     9:35 AM 6/5/2023     8:31 AM   PROMIS-10 Scores Only   Global Mental Health Score 12 12 13 13 13 13 13   Global Physical Health Score 17 18 17 18 17 16 17   PROMIS TOTAL - SUBSCORES 29 30 30 31 30 29 30         ASSESSMENT: Current Emotional / Mental Status (status of significant symptoms):   Risk status (Self / Other harm or suicidal ideation)   Patient denies current fears or concerns for personal safety.   Patient denies current or recent suicidal ideation or behaviors.   Patient denies current or recent homicidal ideation or behaviors.   Patient denies current or recent self injurious behavior or ideation.   Patient denies other safety concerns.   Patient reports there has been no change in risk factors since their last session.     Patient reports there has been no change in protective factors since their last session.     Recommended that patient call 911 or go to the local ED should there be a change in any of these risk factors.     Appearance:   Appropriate    Eye Contact:   Good    Psychomotor Behavior: Normal    Attitude:   Cooperative  Friendly   Orientation:   All   Speech    Rate / Production: Normal     Volume:  Normal    Mood:    Anxious  Normal,   Affect:    Appropriate    Thought Content:  Clear    Thought Form:  Coherent  Goal Directed  Logical    Insight:    Good      Medication Review:   No changes to current psychiatric  medication(s)     Medication Compliance:   Yes     Changes in Health Issues:   None noted       Chemical Use Review:   Substance Use: Chemical use reviewed, no active concerns identified      Tobacco Use: No current tobacco use.      Diagnosis:  1. Major depressive disorder, recurrent episode, mild with anxious distress (H24)        Collateral Reports Completed:   Not Applicable    PLAN: (Patient Tasks / Therapist Tasks / Other)  Continue using PATRIZIA, FAST and Check the Facts engage intentionally with supportive people, prioritize family/friend  time. Maintain no communication with mother, boundary with stepdad. Continue self care      DEE Walden [facility] 7/29/2024        _____________________________________________________________  Individual Treatment Plan    Patient's Name: Joey Spears  YOB: 1995    Date of Creation:   9/13/2021  Date Treatment Plan Last Reviewed/Revised: 6/3/24    There has been demonstrated improvement in functioning while patient has been engaged in psychotherapy/psychological service- if withdrawn the patient would deteriorate and/or relapse.     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    Anticipated number of session for this episode of care:   Anticipation frequency of session: Every other week  Anticipated Duration of each session: 38-52 minutes  Treatment plan will be reviewed in 90 days or when goals have been changed.     MeasurableTreatment Goal(s) related to diagnosis / functional impairment(s)  Goal 1: Client will experience an improvement in mood and anxiety evidenced by self report and AYDIN/PHQ  scores of 5 or less    I will know I've met my goal when I'm enjoying my life more (paraphrase) .      Objective #A (Client Action)    Client will Increase interest, engagement, and pleasure in doing things  Decrease frequency and intensity of feeling down, depressed, hopeless  Improve quantity and quality of night time sleep / decrease daytime naps  Feel less tired and more energy during the day   Identify negative self-talk and behaviors: challenge core beliefs, myths, and actions  Improve concentration, focus, and mindfulness in daily activities   Feel less fidgety, restless or slow in daily activities / interpersonal interactions  Use boundaries and proactive communication in relationships.  Status:    2/5/2024    Intervention(s)  Therapist will teach behavioral activation, sleep hygiene, CBT, DBT and ACT skills, proactive communication, mindfulness, grounding skills to support mood improvement and anxiety reduction.    Patient has reviewed and agreed to the above plan.      DEE Walden,  LICSW 2/5/2024      Answers for HPI/ROS submitted by the patient on 8/16/2022  If you checked off any problems, how difficult have these problems made it for you to do your work, take care of things at home, or get along with other people?: Somewhat difficult  PHQ9 TOTAL SCORE: 8  AYDIN 7 TOTAL SCORE: 12

## 2024-09-03 ENCOUNTER — OFFICE VISIT (OUTPATIENT)
Dept: FAMILY MEDICINE | Facility: CLINIC | Age: 29
End: 2024-09-03
Payer: COMMERCIAL

## 2024-09-03 VITALS
TEMPERATURE: 98.2 F | HEART RATE: 77 BPM | OXYGEN SATURATION: 98 % | HEIGHT: 63 IN | WEIGHT: 148.3 LBS | DIASTOLIC BLOOD PRESSURE: 68 MMHG | SYSTOLIC BLOOD PRESSURE: 106 MMHG | BODY MASS INDEX: 26.28 KG/M2 | RESPIRATION RATE: 17 BRPM

## 2024-09-03 DIAGNOSIS — Z12.4 CERVICAL CANCER SCREENING: ICD-10-CM

## 2024-09-03 DIAGNOSIS — F41.9 ANXIETY: ICD-10-CM

## 2024-09-03 DIAGNOSIS — F33.1 MODERATE EPISODE OF RECURRENT MAJOR DEPRESSIVE DISORDER (H): ICD-10-CM

## 2024-09-03 DIAGNOSIS — Z00.00 ROUTINE GENERAL MEDICAL EXAMINATION AT A HEALTH CARE FACILITY: Primary | ICD-10-CM

## 2024-09-03 PROCEDURE — G0145 SCR C/V CYTO,THINLAYER,RESCR: HCPCS | Performed by: FAMILY MEDICINE

## 2024-09-03 PROCEDURE — 99214 OFFICE O/P EST MOD 30 MIN: CPT | Mod: 25 | Performed by: FAMILY MEDICINE

## 2024-09-03 PROCEDURE — 87624 HPV HI-RISK TYP POOLED RSLT: CPT | Performed by: FAMILY MEDICINE

## 2024-09-03 PROCEDURE — 99395 PREV VISIT EST AGE 18-39: CPT | Performed by: FAMILY MEDICINE

## 2024-09-03 PROCEDURE — 96127 BRIEF EMOTIONAL/BEHAV ASSMT: CPT | Performed by: FAMILY MEDICINE

## 2024-09-03 RX ORDER — BUSPIRONE HYDROCHLORIDE 10 MG/1
10 TABLET ORAL 2 TIMES DAILY
Qty: 180 TABLET | Refills: 3 | Status: SHIPPED | OUTPATIENT
Start: 2024-09-03

## 2024-09-03 RX ORDER — LEVONORGESTREL AND ETHINYL ESTRADIOL 0.15-0.03
1 KIT ORAL DAILY
Qty: 91 TABLET | Refills: 4 | Status: SHIPPED | OUTPATIENT
Start: 2024-09-03

## 2024-09-03 RX ORDER — BUPROPION HYDROCHLORIDE 300 MG/1
300 TABLET ORAL EVERY MORNING
Qty: 90 TABLET | Refills: 3 | Status: SHIPPED | OUTPATIENT
Start: 2024-09-03

## 2024-09-03 RX ORDER — GABAPENTIN 300 MG/1
300 CAPSULE ORAL DAILY
Qty: 90 CAPSULE | Refills: 0 | Status: SHIPPED | OUTPATIENT
Start: 2024-09-03

## 2024-09-03 ASSESSMENT — PAIN SCALES - GENERAL: PAINLEVEL: NO PAIN (0)

## 2024-09-03 ASSESSMENT — PATIENT HEALTH QUESTIONNAIRE - PHQ9
SUM OF ALL RESPONSES TO PHQ QUESTIONS 1-9: 10
SUM OF ALL RESPONSES TO PHQ QUESTIONS 1-9: 10
10. IF YOU CHECKED OFF ANY PROBLEMS, HOW DIFFICULT HAVE THESE PROBLEMS MADE IT FOR YOU TO DO YOUR WORK, TAKE CARE OF THINGS AT HOME, OR GET ALONG WITH OTHER PEOPLE: VERY DIFFICULT

## 2024-09-03 NOTE — PROGRESS NOTES
Preventive Care Visit  [facility]  Kaci Villar MD, MD, Family Medicine  Sep 3, 2024      Assessment & Plan       ICD-10-CM    1. Routine general medical examination at a health care facility  Z00.00 levonorgestrel-ethinyl estradiol (SEASONALE) 0.15-0.03 MG tablet      2. Cervical cancer screening  Z12.4 HPV and Gynecologic Cytology Panel - Recommended Age 30 - 65 Years     Gynecologic Cytology (PAP)      3. Anxiety  F41.9 Adult Mental Health  Referral     gabapentin (NEURONTIN) 300 MG capsule     buPROPion (WELLBUTRIN XL) 300 MG 24 hr tablet     busPIRone (BUSPAR) 10 MG tablet      4. Moderate episode of recurrent major depressive disorder (H)  F33.1 Adult Mental Health  Referral     buPROPion (WELLBUTRIN XL) 300 MG 24 hr tablet     busPIRone (BUSPAR) 10 MG tablet         Routine preventive visit  Healthy  Pap with HPV cotesting completed today   Has paragard IUD for contraception  Stable male partner - denies concern for STI    Immunizations: COVID and influenza vaccine recommended     Moderate episode of recurrent major depressive disorder (H)  Anxiety   Depression is well controlled.   Anxiety is worse.    Continue wellbutrin 300mg daily which has been working well for depression  buspar 10mg twice daily was providing some benefit for anxiety. She increased the buspar to 15mg twice daily on her own and did not notice additional benefit. Will continue buspar 10mg twice daily  She reports drinking more alcohol and using cannabis to cope with anxiety and is adamant about prn medication-not interested in selective serotonin reuptake inhibitor or SNRI.   Offered hydroxyzine, but it make her too sleepy, even at 12.5mg.   She requests ativan because a friend takes it for anxiety, but I discussed I do not recommend benzo in general because it is habit forming. I have additional concern about using habit-forming medication given her report of increased alcohol use.  "  Discussed trial of gabapentin 300mg daily for anxiety - with additional possible benefit for reduction of alcohol use. She would like to try gabapentin. In addition, I have referred her to collaborative care psychiatry.   Continues to see her therapist   Follow-up with me in 2 to 4 weeks.      She did not respond to fluoxetine previously and was hospitalized with suicidal ideation after fluoxetine was started when she was in high school. She is very reluctant to try other medications.   Has therapy appt scheduled in May       Contraception  Has ParaGard IUD in place since 2018-strings visible on exam today  She is interested in switching to hormonal birth control to reduce or eliminate periods.  Discussed options and she is most interested in OCP at this time.  She has tolerated it well in the past.  Discussed potential benefits/side effects.   Start Seasonale.  She may consider Mirena IUD as well.   Had implanon placed in the past, but it was retrievable when it was time for removal.   She tolerated depo well in the past and would be open to using depo again as well.   Discussed she can keep the ParaGard IUD in for now as she considers the best future option.  She does not wish to have any children.            Nicotine/Tobacco Cessation  She reports that she has been smoking cigarettes, other, and vaping device. She started smoking about 14 years ago. She has a 5 pack-year smoking history. She uses smokeless tobacco.  Nicotine/Tobacco Cessation Plan  Discuss at follow up      BMI  Estimated body mass index is 26.69 kg/m  as calculated from the following:    Height as of this encounter: 1.588 m (5' 2.5\").    Weight as of this encounter: 67.3 kg (148 lb 4.8 oz).   Weight management plan: diet and exercise    Counseling  Appropriate preventive services were addressed with this patient via screening, questionnaire, or discussion as appropriate for fall prevention, nutrition, physical activity, Tobacco-use cessation, " "social engagement, weight loss and cognition.  Checklist reviewing preventive services available has been given to the patient.  Reviewed patient's diet, addressing concerns and/or questions.   She is at risk for lack of exercise and has been provided with information to increase physical activity for the benefit of her well-being.   The patient was instructed to see the dentist every 6 months.   The patient reports drinking more than 3 alcoholic drinks per day and/or more than 7 drhnks per week. The patient was counseled and given information about possible harmful effects of excessive alcohol intake.The patient's PHQ-9 score is consistent with moderate depression. She was provided with information regarding depression.          Myrna Cook is a 29 year old, presenting for the following:  Physical        9/3/2024     9:30 AM   Additional Questions   Roomed by Agatha MENSAH        Health Care Directive  Patient does not have a Health Care Directive or Living Will: Discussed advance care planning with patient; however, patient declined at this time.    HPI     Wants to make sure her IUD is in place - paragard until 2018. Has considered going back on hormonal birth control. She at the time of hte IUD wanted a non hormonal. Now that she has her periods again she does not want them anymore. She did not have any problems with depo. She also had the implanon and found that worked well. The could not find the implanon to retrieve. Had OCP through high school and it was fine.     Wants to change her anxiety medication.   She feels like her depression is being managed really well with the medication and therapy, but the anxiety is getting worse and starting to affect her social life, work and relationships. Affecting her sleep. Wants to supplement with \"something stronger\". Some girlfriends use ativan.     Does not want to keep self-medicating with alcohol and cannabis.  Alcohol intake most days - maybe 1 per day average " during the week and socializing closer to 3-4.  Cannabis sometimes for sleep. Sometimes makes her anxiety worse.     She has been taking 1.5 buspar tablet twice a day. Not really seeing benefit.     Exercise - works as  moves all day and does a lot of walks, but has doing a lot of weight lifting.     Sleep varies - sometimes too much, sometimes not enough. On average she gets about 7 hours per night or 6-7. Mostly her sleep is limited because she cannot fall asleep. Has tried a lot of sleep aids and does not ever feel like it is quality sleep. Does not wake up feeling good. OTC sleep aids like benadryl or unisom. Has done melatonin- she gets nightmares. Trazodone was bad for that as well. Does not remember if hydroxyzine was that way as well, but is not interested in trying it again.                  9/3/2024   General Health   How would you rate your overall physical health? Good   Feel stress (tense, anxious, or unable to sleep) Very much      (!) STRESS CONCERN      9/3/2024   Nutrition   Three or more servings of calcium each day? Yes   Diet: Regular (no restrictions)   How many servings of fruit and vegetables per day? (!) 2-3   How many sweetened beverages each day? 0-1            9/3/2024   Exercise   Days per week of moderate/strenous exercise 3 days   Average minutes spent exercising at this level 60 min            9/3/2024   Social Factors   Frequency of gathering with friends or relatives Twice a week   Worry food won't last until get money to buy more No   Food not last or not have enough money for food? No   Do you have housing? (Housing is defined as stable permanent housing and does not include staying ouside in a car, in a tent, in an abandoned building, in an overnight shelter, or couch-surfing.) Yes   Are you worried about losing your housing? No   Lack of transportation? No   Unable to get utilities (heat,electricity)? No            9/3/2024   Dental   Dentist two times every year? (!)  NO            9/3/2024   TB Screening   Were you born outside of the [place]? No          Today's PHQ-9 Score:       9/3/2024     7:00 AM   PHQ-9 SCORE   PHQ-9 Total Score MyChart 10 (Moderate depression)   PHQ-9 Total Score 10         9/3/2024   Substance Use   Alcohol more than 3/day or more than 7/wk Yes   How often do you have a drink containing alcohol 4 or more times a week   How many alcohol drinks on typical day 1 or 2   How often do you have 5+ drinks at one occasion Monthly   Audit 2/3 Score 2   How often not able to stop drinking once started Less than monthly   How often failed to do what normally expected Monthly   How often needed first drink in am after a heavy drinking session Less than monthly   How often feeling of guilt or remorse after drinking Daily or almost daily   How often unable to remember what happened the night before Less than monthly   Have you or someone else been injured because of your drinking No   Has anyone been concerned or suggested you cut down on drinking No   TOTAL SCORE - AUDIT 15   Do you use any other substances recreationally? (!) CANNABIS PRODUCTS        Social History     Tobacco Use    Smoking status: Every Day     Current packs/day: 0.00     Average packs/day: 0.5 packs/day for 10.0 years (5.0 ttl pk-yrs)     Types: Cigarettes, Other, Vaping Device     Start date: 2010     Last attempt to quit: 2020     Years since quittin.4    Smokeless tobacco: Current    Tobacco comments:     Quit cigarettes- currently using vape   Substance Use Topics    Alcohol use: Yes    Drug use: Never           2023   LAST FHS-7 RESULTS   1st degree relative breast or ovarian cancer Yes   Any relative bilateral breast cancer No   Any male have breast cancer No   Any ONE woman have BOTH breast AND ovarian cancer No   Any woman with breast cancer before 50yrs Yes   2 or more relatives with breast AND/OR ovarian cancer No   2 or more relatives with breast AND/OR bowel cancer No     "       Mammogram Screening - Mammogram every 1-2 years updated in Health Maintenance based on mutual decision making        9/3/2024   STI Screening   New sexual partner(s) since last STI/HIV test? No        History of abnormal Pap smear: No - age 30- 64 PAP with HPV every 5 years recommended        2/8/2021     1:23 PM   PAP / HPV   PAP (Historical) NIL            9/3/2024   Contraception/Family Planning   Questions about contraception or family planning (!) YES             Reviewed and updated as needed this visit by Provider                    Past Medical History:   Diagnosis Date    Depressive disorder 2008    Previously medicated         Objective    Exam  /68 (BP Location: Right arm, Patient Position: Sitting, Cuff Size: Adult Regular)   Pulse 77   Temp 98.2  F (36.8  C) (Temporal)   Resp 17   Ht 1.588 m (5' 2.5\")   Wt 67.3 kg (148 lb 4.8 oz)   LMP 08/23/2024 (Approximate)   SpO2 98%   BMI 26.69 kg/m     Estimated body mass index is 26.69 kg/m  as calculated from the following:    Height as of this encounter: 1.588 m (5' 2.5\").    Weight as of this encounter: 67.3 kg (148 lb 4.8 oz).    Physical Exam  GENERAL: alert and no distress  EYES: Eyes grossly normal to inspection, PERRL and conjunctivae and sclerae normal  HENT: ear canals and TM's normal, nose and mouth without ulcers or lesions  NECK: no adenopathy, no asymmetry, masses, or scars  RESP: lungs clear to auscultation - no rales, rhonchi or wheezes  CV: regular rate and rhythm, normal S1 S2, no S3 or S4, no murmur, click or rub, no peripheral edema  ABDOMEN: soft, nontender, no hepatosplenomegaly, no masses and bowel sounds normal   (female): normal female external genitalia, normal urethral meatus, normal vaginal mucosa, normal cervix  MS: no gross musculoskeletal defects noted, no edema  SKIN: no suspicious lesions or rashes  NEURO: Normal strength and tone, mentation intact and speech normal  PSYCH: mentation appears normal, affect " normal/bright        Signed Electronically by: Kaci Villar MD     Answers submitted by the patient for this visit:  Patient Health Questionnaire (Submitted on 9/3/2024)  If you checked off any problems, how difficult have these problems made it for you to do your work, take care of things at home, or get along with other people?: Very difficult  PHQ9 TOTAL SCORE: 10

## 2024-09-03 NOTE — PATIENT INSTRUCTIONS
I recommend taking a magnesium supplement at bedtime daily. Magnesium glycinate or citrate are absorbed well and good options. Magnesium L-Threonate passes through to the brain more easily and could be more beneficial for sleep (though I am not aware of research studies done on this)   Mental health resource:   [facility], on the first floor of our building, is a large [facility]. They currently treat adults only at this location and can provide both therapy and medication management.   Call their intake line at [phone] to schedule with someone with the desired qualifications/specifications. They offer both in person and online appointments.   You can also book online through their website.     Patient Education   Preventive Care Advice   This is general advice given by our system to help you stay healthy. However, your care team may have specific advice just for you. Please talk to your care team about your preventive care needs.  Nutrition  Eat 5 or more servings of fruits and vegetables each day.  Try wheat bread, brown rice and whole grain pasta (instead of white bread, rice, and pasta).  Get enough calcium and vitamin D. Check the label on foods and aim for 100% of the RDA (recommended daily allowance).  Lifestyle  Exercise at least 150 minutes each week  (30 minutes a day, 5 days a week).  Do muscle strengthening activities 2 days a week. These help control your weight and prevent disease.  No smoking.  Wear sunscreen to prevent skin cancer.  Have a dental exam and cleaning every 6 months.  Yearly exams  See your health care team every year to talk about:  Any changes in your health.  Any medicines your care team has prescribed.  Preventive care, family planning, and ways to prevent chronic diseases.  Shots (vaccines)   HPV shots (up to age 26), if you've never had them before.  Hepatitis B shots (up to age 59), if you've never had them before.  COVID-19 shot: Get this shot when it's  due.  Flu shot: Get a flu shot every year.  Tetanus shot: Get a tetanus shot every 10 years.  Pneumococcal, hepatitis A, and RSV shots: Ask your care team if you need these based on your risk.  Shingles shot (for age 50 and up)  General health tests  Diabetes screening:  Starting at age 35, Get screened for diabetes at least every 3 years.  If you are younger than age 35, ask your care team if you should be screened for diabetes.  Cholesterol test: At age 39, start having a cholesterol test every 5 years, or more often if advised.  Bone density scan (DEXA): At age 50, ask your care team if you should have this scan for osteoporosis (brittle bones).  Hepatitis C: Get tested at least once in your life.  STIs (sexually transmitted infections)  Before age 24: Ask your care team if you should be screened for STIs.  After age 24: Get screened for STIs if you're at risk. You are at risk for STIs (including HIV) if:  You are sexually active with more than one person.  You don't use condoms every time.  You or a partner was diagnosed with a sexually transmitted infection.  If you are at risk for HIV, ask about PrEP medicine to prevent HIV.  Get tested for HIV at least once in your life, whether you are at risk for HIV or not.  Cancer screening tests  Cervical cancer screening: If you have a cervix, begin getting regular cervical cancer screening tests starting at age 21.  Breast cancer scan (mammogram): If you've ever had breasts, begin having regular mammograms starting at age 40. This is a scan to check for breast cancer.  Colon cancer screening: It is important to start screening for colon cancer at age 45.  Have a colonoscopy test every 10 years (or more often if you're at risk) Or, ask your provider about stool tests like a FIT test every year or Cologuard test every 3 years.  To learn more about your testing options, visit:   .  For help making a decision, visit:   https://bit.ly/za13866.  Prostate cancer screening  test: If you have a prostate, ask your care team if a prostate cancer screening test (PSA) at age 55 is right for you.  Lung cancer screening: If you are a current or former smoker ages 50 to 80, ask your care team if ongoing lung cancer screenings are right for you.  For informational purposes only. Not to replace the advice of your health care provider. Copyright   2023 [facility]. All rights reserved. Clinically reviewed by the [facility]. [employer] 762843 - REV 01/24.  Learning About Stress  What is stress?     Stress is your body's response to a hard situation. Your body can have a physical, emotional, or mental response. Stress is a fact of life for most people, and it affects everyone differently. What causes stress for you may not be stressful for someone else.  A lot of things can cause stress. You may feel stress when you go on a job interview, take a test, or run a race. This kind of short-term stress is normal and even useful. It can help you if you need to work hard or react quickly. For example, stress can help you finish an important job on time.  Long-term stress is caused by ongoing stressful situations or events. Examples of long-term stress include long-term health problems, ongoing problems at work, or conflicts in your family. Long-term stress can harm your health.  How does stress affect your health?  When you are stressed, your body responds as though you are in danger. It makes hormones that speed up your heart, make you breathe faster, and give you a burst of energy. This is called the fight-or-flight stress response. If the stress is over quickly, your body goes back to normal and no harm is done.  But if stress happens too often or lasts too long, it can have bad effects. Long-term stress can make you more likely to get sick, and it can make symptoms of some diseases worse. If you tense up when you are stressed, you may develop neck, shoulder, or  low back pain. Stress is linked to high blood pressure and heart disease.  Stress also harms your emotional health. It can make you obrien, tense, or depressed. Your relationships may suffer, and you may not do well at work or school.  What can you do to manage stress?  You can try these things to help manage stress:   Do something active. Exercise or activity can help reduce stress. Walking is a great way to get started. Even everyday activities such as housecleaning or yard work can help.  Try yoga or jimmy chi. These techniques combine exercise and meditation. You may need some training at first to learn them.  Do something you enjoy. For example, listen to music or go to a movie. Practice your hobby or do volunteer work.  Meditate. This can help you relax, because you are not worrying about what happened before or what may happen in the future.  Do guided imagery. Imagine yourself in any setting that helps you feel calm. You can use online videos, books, or a teacher to guide you.  Do breathing exercises. For example:  From a standing position, bend forward from the waist with your knees slightly bent. Let your arms dangle close to the floor.  Breathe in slowly and deeply as you return to a standing position. Roll up slowly and lift your head last.  Hold your breath for just a few seconds in the standing position.  Breathe out slowly and bend forward from the waist.  Let your feelings out. Talk, laugh, cry, and express anger when you need to. Talking with supportive friends or family, a counselor, or a iliana leader about your feelings is a healthy way to relieve stress. Avoid discussing your feelings with people who make you feel worse.  Write. It may help to write about things that are bothering you. This helps you find out how much stress you feel and what is causing it. When you know this, you can find better ways to cope.  What can you do to prevent stress?  You might try some of these things to help prevent  "stress:  Manage your time. This helps you find time to do the things you want and need to do.  Get enough sleep. Your body recovers from the stresses of the day while you are sleeping.  Get support. Your family, friends, and community can make a difference in how you experience stress.  Limit your news feed. Avoid or limit time on social media or news that may make you feel stressed.  Do something active. Exercise or activity can help reduce stress. Walking is a great way to get started.  Where can you learn more?  Go to https://www.[employer].net/patiented  Enter N032 in the search box to learn more about \"Learning About Stress.\"  Current as of: October 24, 2023               Content Version: 14.0    3566-4315 [employer].   Care instructions adapted under license by your healthcare professional. If you have questions about a medical condition or this instruction, always ask your healthcare professional. [employer] disclaims any warranty or liability for your use of this information.      Learning About Depression Screening  What is depression screening?  Depression screening is a way to see if you have depression symptoms. It may be done by a doctor or counselor. It's often part of a routine checkup. That's because your mental health is just as important as your physical health.  Depression is a mental health condition that affects how you feel, think, and act. You may:  Have less energy.  Lose interest in your daily activities.  Feel sad and grouchy for a long time.  Depression is very common. It affects people of all ages.  Many things can lead to depression. Some people become depressed after they have a stroke or find out they have a major illness like cancer or heart disease. The death of a loved one or a breakup may lead to depression. It can run in families. Most experts believe that a combination of inherited genes and stressful life events can cause it.  What happens during " "screening?  You may be asked to fill out a form about your depression symptoms. You and the doctor will discuss your answers. The doctor may ask you more questions to learn more about how you think, act, and feel.  What happens after screening?  If you have symptoms of depression, your doctor will talk to you about your options.  Doctors usually treat depression with medicines or counseling. Often, combining the two works best. Many people don't get help because they think that they'll get over the depression on their own. But people with depression may not get better unless they get treatment.  The cause of depression is not well understood. There may be many factors involved. But if you have depression, it's not your fault.  A serious symptom of depression is thinking about death or suicide. If you or someone you care about talks about this or about feeling hopeless, get help right away.  It's important to know that depression can be treated. Medicine, counseling, and self-care may help.  Where can you learn more?  Go to https://www.[employer].net/patiented  Enter T185 in the search box to learn more about \"Learning About Depression Screening.\"  Current as of: June 24, 2023  Content Version: 14.1    3693-2060 [employer].   Care instructions adapted under license by your healthcare professional. If you have questions about a medical condition or this instruction, always ask your healthcare professional. [employer] disclaims any warranty or liability for your use of this information.    9 Ways to Cut Back on Drinking  Maybe you've found yourself drinking more alcohol than you'd prefer. If you want to cut back, here are some ideas to try.    Think before you drink.  Do you really want a drink, or is it just a habit? If you're used to having a drink at a certain time, try doing something else then.     Look for substitutes.  Find some no-alcohol drinks that you enjoy, like flavored seltzer " "water, tea with honey, or tonic with a slice of lime. Or try alcohol-free beer or \"virgin\" cocktails (without the alcohol).     Drink more water.  Use water to quench your thirst. Drink a glass of water before you have any alcohol. Have another glass along with every drink or between drinks.     Shrink your drink.  For example, have a bottle of beer instead of a pint. Use a smaller glass for wine. Choose drinks with lower alcohol content (ABV%). Or use less liquor and more mixer in cocktails.     Slow down.  It's easy to drink quickly and without thinking about it. Pay attention, and make each drink last longer.     Do the math.  Total up how much you spend on alcohol each month. How much is that a year? If you cut back, what could you do with the money you save?     Take a break.  Choose a day or two each week when you won't drink at all. Notice how you feel on those days, physically and emotionally. How did you sleep? Do you feel better? Over time, add more break days.     Count calories.  Would you like to lose some weight? For some people that's a good motivator for cutting back. Figure out how many calories are in each drink. How many does that add up to in a day? In a week? In a month?     Practice saying no.  Be ready when someone offers you a drink. Try: \"Thanks, I've had enough.\" Or \"Thanks, but I'm cutting back.\" Or \"No, thanks. I feel better when I drink less.\"   Current as of: November 15, 2023  Content Version: 14.1 2006-2024 [employer].   Care instructions adapted under license by your healthcare professional. If you have questions about a medical condition or this instruction, always ask your healthcare professional. [employer] disclaims any warranty or liability for your use of this information.  Substance Use Disorder: Care Instructions  Overview     You can improve your life and health by stopping your use of alcohol or drugs. When you don't drink or use drugs, you " may feel and sleep better. You may get along better with your family, friends, and coworkers. There are medicines and programs that can help with substance use disorder.  How can you care for yourself at home?  Here are some ways to help you stay sober and prevent relapse.  If you have been given medicine to help keep you sober or reduce your cravings, be sure to take it exactly as prescribed.  Talk to your doctor about programs that can help you stop using drugs or drinking alcohol.  Do not keep alcohol or drugs in your home.  Plan ahead. Think about what you'll say if other people ask you to drink or use drugs. Try not to spend time with people who drink or use drugs.  Use the time and money spent on drinking or drugs to do something that's important to you.  Preventing a relapse  Have a plan to deal with relapse. Learn to recognize changes in your thinking that lead you to drink or use drugs. Get help before you start to drink or use drugs again.  Try to stay away from situations, friends, or places that may lead you to drink or use drugs.  If you feel the need to drink alcohol or use drugs again, seek help right away. Call a trusted friend or family member. Some people get support from organizations such as Narcotics Anonymous or [employer] or from treatment facilities.  If you relapse, get help as soon as you can. Some people make a plan with another person that outlines what they want that person to do for them if they relapse. The plan usually includes how to handle the relapse and who to notify in case of relapse.  Don't give up. Remember that a relapse doesn't mean that you have failed. Use the experience to learn the triggers that lead you to drink or use drugs. Then quit again. Recovery is a lifelong process. Many people have several relapses before they are able to quit for good.  Follow-up care is a key part of your treatment and safety. Be sure to make and go to all appointments, and call your  "doctor if you are having problems. It's also a good idea to know your test results and keep a list of the medicines you take.  When should you call for help?   Call 911  anytime you think you may need emergency care. For example, call if you or someone else:    Has overdosed or has withdrawal signs. Be sure to tell the emergency workers that you are or someone else is using or trying to quit using drugs. Overdose or withdrawal signs may include:  Losing consciousness.  Seizure.  Seeing or hearing things that aren't there (hallucinations).     Is thinking or talking about suicide or harming others.   Where to get help 24 hours a day, 7 days a week   If you or someone you know talks about suicide, self-harm, a mental health crisis, a substance use crisis, or any other kind of emotional distress, get help right away. You can:    Call the Suicide and Crisis Lifeline at 988.     Call 6-525-423-TALK (1-421.897.1751).     Text HOME to 340470 to access the Crisis Text Line.   Consider saving these numbers in your phone.  Go to [employer] for more information or to chat online.  Call your doctor now or seek immediate medical care if:    You are having withdrawal symptoms. These may include nausea or vomiting, sweating, shakiness, and anxiety.   Watch closely for changes in your health, and be sure to contact your doctor if:    You have a relapse.     You need more help or support to stop.   Where can you learn more?  Go to https://www.[employer].net/patiented  Enter H573 in the search box to learn more about \"Substance Use Disorder: Care Instructions.\"  Current as of: November 15, 2023               Content Version: 14.0    9257-1730 [employer].   Care instructions adapted under license by your healthcare professional. If you have questions about a medical condition or this instruction, always ask your healthcare professional. [employer] disclaims any warranty or liability for your use of " this information.

## 2024-09-05 LAB
HPV HR 12 DNA CVX QL NAA+PROBE: NEGATIVE
HPV16 DNA CVX QL NAA+PROBE: NEGATIVE
HPV18 DNA CVX QL NAA+PROBE: NEGATIVE
HUMAN PAPILLOMA VIRUS FINAL DIAGNOSIS: NORMAL

## 2024-09-09 ENCOUNTER — VIRTUAL VISIT (OUTPATIENT)
Dept: PSYCHOLOGY | Facility: CLINIC | Age: 29
End: 2024-09-09
Payer: COMMERCIAL

## 2024-09-09 DIAGNOSIS — F33.0 MAJOR DEPRESSIVE DISORDER, RECURRENT EPISODE, MILD WITH ANXIOUS DISTRESS (H): Primary | ICD-10-CM

## 2024-09-09 LAB
BKR AP ASSOCIATED HPV REPORT: NORMAL
BKR LAB AP GYN ADEQUACY: NORMAL
BKR LAB AP GYN INTERPRETATION: NORMAL
BKR LAB AP PREVIOUS ABNORMAL: NORMAL
PATH REPORT.COMMENTS IMP SPEC: NORMAL
PATH REPORT.COMMENTS IMP SPEC: NORMAL
PATH REPORT.RELEVANT HX SPEC: NORMAL

## 2024-09-09 PROCEDURE — 90837 PSYTX W PT 60 MINUTES: CPT | Mod: 95

## 2024-09-09 NOTE — PROGRESS NOTES
[facility]                                     Progress Note    Patient Name: Joey Spears  Date:   9/9/2024        Service Type: Individual      Session Start Time: 2:02 pm  Session End Time: 2:55 pm     Session Length: 53 min    Session #: 54    Attendees: Client attended alone    Service Modality:    Telemedicine Visit: The patient's condition can be safely assessed and treated via synchronous audio and visual telemedicine encounter.      Reason for Telemedicine Visit: Patient has requested telehealth visit    Originating Site (Patient Location): Patient's home    Distant Site (Provider Location): Provider Remote Setting- Home Office    Consent:  The patient/guardian has verbally consented to: the potential risks and benefits of telemedicine (video visit) versus in person care; bill my insurance or make self-payment for services provided; and responsibility for payment of non-covered services.     Mode of Communication:  Video Conference via hi5    As the provider I attest to compliance with applicable laws and regulations related to telemedicine.    DATA  Interactive Complexity: No  Crisis: No      Progress Since Last Session (Related to Symptoms / Goals / Homework):  Symptoms:  Improving reducing situational stressors    Homework: Achieved / completed to satisfaction      Episode of Care Goals: Satisfactory progress - ACTION (Actively working towards change); Intervened by reinforcing change plan / affirming steps taken     Current / Ongoing Stressors and Concerns:  Completed move, visitors came, need for return to routine. Improving work stability, financial stability. Focus on boundaries, self care to support wellbeing at work/in personal life. Sister discharged from IP eating disorder.  Ongoing No contact with mom, managing contact with stepdad. Continuing desire to focus on positive-productive relationships/activities that support wellness, learning to prioritize  personal needs.       Treatment Objective(s) Addressed in This Session:   Use boundaries and proactive communication in relationships.    Improve quantity and quality of night time sleep / decrease daytime naps   Identify negative self-talk and behaviors: challenge core beliefs, myths, and actions  Improve concentration, focus, and mindfulness in daily activities   Feel less fidgety, restless or slow in daily activities / interpersonal interactions    Interventions:  Supportive Therapy: Provided active listening and validation. Surfaced need for balance in home responsibility, especially with move.   Motivational Interviewing  Target Behavior: continue using and augmenting   boundaries, proactive communication skills to get needs met , continue no contact with mom, focusing on healthy and supportive relationships, creating personal time and space.   Stage of Change: ACTION (Actively working towards change)     MI Intervention: Expressed Empathy/Understanding, Supported Autonomy, Collaboration, Evocation, Open-ended questions, Reflections: simple and complex, and Change talk (evoked)     Change Talk Expressed by the Patient: Ability to change Reasons to change Need to change Committment to change Activation Taking steps    Provider Response to Change Talk: E - Evoked more info from patient about behavior change, A - Affirmed patient's thoughts, decisions, or attempts at behavior change, and R - Reflected patient's change talk     Assessments completed prior to visit: 2/15/21 DA  The following assessments were completed by patient for this visit:      PHQ9:       3/13/2023     8:50 AM 5/22/2023     9:33 AM 6/5/2023     8:29 AM 7/24/2023     8:44 AM 8/10/2023     2:04 PM 8/28/2023     8:17 AM 9/3/2024     7:00 AM   PHQ-9 SCORE   PHQ-9 Total Score [place] 6 (Mild depression) 7 (Mild depression) 6 (Mild depression) 11 (Moderate depression) 10 (Moderate depression) 8 (Mild depression) 10 (Moderate depression)   PHQ-9  Total Score 6 7 6 11 10 8 10     GAD7:       10/25/2022     3:57 AM 11/14/2022     8:40 AM 11/28/2022     8:14 AM 5/22/2023     9:34 AM 6/5/2023     8:30 AM 7/24/2023     8:45 AM 8/28/2023     8:19 AM   AYDIN-7 SCORE   Total Score 7 (mild anxiety) 7 (mild anxiety) 11 (moderate anxiety) 10 (moderate anxiety) 8 (mild anxiety) 13 (moderate anxiety) 15 (severe anxiety)   Total Score 7 7 11 10 8 13 15     PROMIS 10-Global Health (only subscores and total score):       6/29/2022     4:37 AM 10/3/2022     7:49 AM 10/25/2022     3:59 AM 2/6/2023     9:41 AM 2/20/2023     9:13 AM 5/22/2023     9:35 AM 6/5/2023     8:31 AM   PROMIS-10 Scores Only   Global Mental Health Score 12 12 13 13 13 13 13   Global Physical Health Score 17 18 17 18 17 16 17   PROMIS TOTAL - SUBSCORES 29 30 30 31 30 29 30         ASSESSMENT: Current Emotional / Mental Status (status of significant symptoms):   Risk status (Self / Other harm or suicidal ideation)   Patient denies current fears or concerns for personal safety.   Patient denies current or recent suicidal ideation or behaviors.   Patient denies current or recent homicidal ideation or behaviors.   Patient denies current or recent self injurious behavior or ideation.   Patient denies other safety concerns.   Patient reports there has been no change in risk factors since their last session.     Patient reports there has been no change in protective factors since their last session.     Recommended that patient call 911 or go to the local ED should there be a change in any of these risk factors.     Appearance:   Appropriate    Eye Contact:   Good    Psychomotor Behavior: Normal    Attitude:   Cooperative  Friendly   Orientation:   All   Speech    Rate / Production: Normal     Volume:  Normal    Mood:    Anxious  Normal,   Affect:    Appropriate    Thought Content:  Clear    Thought Form:  Coherent  Goal Directed  Logical    Insight:    Good      Medication Review:   No changes to current  psychiatric medication(s)     Medication Compliance:   Yes     Changes in Health Issues:   None noted       Chemical Use Review:   Substance Use: Chemical use reviewed, no active concerns identified      Tobacco Use: No current tobacco use.      Diagnosis:  1. Major depressive disorder, recurrent episode, mild with anxious distress (H24)        Collateral Reports Completed:   Not Applicable    PLAN: (Patient Tasks / Therapist Tasks / Other)  Continue using PATRIZIA, FAST and Check the Facts engage intentionally with supportive people, prioritize family/friend  time. Maintain no communication with mother, boundary with stepdad. Continue self care, return to routine, attend med appt    DEE Walden [facility] 9/9/2024        _____________________________________________________________  Individual Treatment Plan    Patient's Name: Joey Spears  YOB: 1995    Date of Creation:   9/13/2021  Date Treatment Plan Last Reviewed/Revised: 9/9/24    There has been demonstrated improvement in functioning while patient has been engaged in psychotherapy/psychological service- if withdrawn the patient would deteriorate and/or relapse.     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    Anticipated number of session for this episode of care:   Anticipation frequency of session: Every other week  Anticipated Duration of each session: 38-52 minutes  Treatment plan will be reviewed in 90 days or when goals have been changed.     MeasurableTreatment Goal(s) related to diagnosis / functional impairment(s)  Goal 1: Client will experience an improvement in mood and  anxiety evidenced by self report and AYDIN/PHQ scores of 5 or less    I will know I've met my goal when I'm enjoying my life more (paraphrase) .      Objective #A (Client Action)    Client will Increase interest, engagement, and pleasure in doing things  Decrease frequency and intensity of feeling down, depressed, hopeless  Improve quantity and quality of night time sleep / decrease daytime naps  Feel less tired and more energy during the day   Identify negative self-talk and behaviors: challenge core beliefs, myths, and actions  Improve concentration, focus, and mindfulness in daily activities   Feel less fidgety, restless or slow in daily activities / interpersonal interactions  Use boundaries and proactive communication in relationships.  Status:    9/9/2024    Intervention(s)  Therapist will teach behavioral activation, sleep hygiene, CBT, DBT and ACT skills, proactive communication, mindfulness, grounding skills to support mood improvement and anxiety reduction.    Patient has reviewed and agreed to the above plan.      DEE Walden,  [facility] 9/9/2024      Answers for HPI/ROS submitted by the patient on 8/16/2022  If you checked off any problems, how difficult have these problems made it for you to do your work, take care of things at home, or get along with other people?: Somewhat difficult  PHQ9 TOTAL SCORE: 8  AYDIN 7 TOTAL SCORE: 12

## 2024-09-23 ENCOUNTER — VIRTUAL VISIT (OUTPATIENT)
Dept: PSYCHOLOGY | Facility: CLINIC | Age: 29
End: 2024-09-23
Payer: COMMERCIAL

## 2024-09-23 DIAGNOSIS — F33.0 MAJOR DEPRESSIVE DISORDER, RECURRENT EPISODE, MILD WITH ANXIOUS DISTRESS (H): Primary | ICD-10-CM

## 2024-09-23 PROCEDURE — 90837 PSYTX W PT 60 MINUTES: CPT | Mod: 95

## 2024-09-23 ASSESSMENT — PATIENT HEALTH QUESTIONNAIRE - PHQ9
10. IF YOU CHECKED OFF ANY PROBLEMS, HOW DIFFICULT HAVE THESE PROBLEMS MADE IT FOR YOU TO DO YOUR WORK, TAKE CARE OF THINGS AT HOME, OR GET ALONG WITH OTHER PEOPLE: SOMEWHAT DIFFICULT
SUM OF ALL RESPONSES TO PHQ QUESTIONS 1-9: 5
SUM OF ALL RESPONSES TO PHQ QUESTIONS 1-9: 5

## 2024-09-23 ASSESSMENT — ANXIETY QUESTIONNAIRES
7. FEELING AFRAID AS IF SOMETHING AWFUL MIGHT HAPPEN: NOT AT ALL
GAD7 TOTAL SCORE: 7
GAD7 TOTAL SCORE: 7
8. IF YOU CHECKED OFF ANY PROBLEMS, HOW DIFFICULT HAVE THESE MADE IT FOR YOU TO DO YOUR WORK, TAKE CARE OF THINGS AT HOME, OR GET ALONG WITH OTHER PEOPLE?: SOMEWHAT DIFFICULT
GAD7 TOTAL SCORE: 7

## 2024-09-23 NOTE — PROGRESS NOTES
[facility]                                     Progress Note    Patient Name: Joey Spears  Date:   9/23/2024        Service Type: Individual      Session Start Time: 3:02 pm  Session End Time: 3:55 pm     Session Length: 53 min    Session #: 55    Attendees: Client attended alone    Service Modality:    Telemedicine Visit: The patient's condition can be safely assessed and treated via synchronous audio and visual telemedicine encounter.      Reason for Telemedicine Visit: Patient has requested telehealth visit    Originating Site (Patient Location): Patient's home    Distant Site (Provider Location): Provider Remote Setting- Home Office    Consent:  The patient/guardian has verbally consented to: the potential risks and benefits of telemedicine (video visit) versus in person care; bill my insurance or make self-payment for services provided; and responsibility for payment of non-covered services.     Mode of Communication:  Video Conference via [employer]    As the provider I attest to compliance with applicable laws and regulations related to telemedicine.    DATA  Interactive Complexity: No  Crisis: No      Progress Since Last Session (Related to Symptoms / Goals / Homework):  Symptoms:  Improving reducing situational stressors    Homework: Achieved / completed to satisfaction      Episode of Care Goals: Satisfactory progress - ACTION (Actively working towards change); Intervened by reinforcing change plan / affirming steps taken     Current / Ongoing Stressors and Concerns:  Addressing partner's limited social activity. Shopped for engagement rings, worrying about wedding guest list. Improving work stability, financial stability. Focus on boundaries, self care to support wellbeing at work/in personal life. Sister discharged from IP eating disorder.  Ongoing No contact with mom, managing contact with stepdad. Continuing focus on positive-productive relationships/activities that  support wellness, learning to prioritize personal needs.       Treatment Objective(s) Addressed in This Session:   Use boundaries and proactive communication in relationships.    Improve quantity and quality of night time sleep / decrease daytime naps   Identify negative self-talk and behaviors: challenge core beliefs, myths, and actions  Improve concentration, focus, and mindfulness in daily activities   Feel less fidgety, restless or slow in daily activities / interpersonal interactions    Interventions:  Supportive Therapy: Provided active listening and validation. Surfaced need for balance in home responsibility, especially with travel and wedding planning  Motivational Interviewing  Target Behavior: continue using and augmenting   boundaries, proactive communication skills to get needs met , continue no contact with mom, focusing on healthy and supportive relationships, creating personal time and space.   Stage of Change: ACTION (Actively working towards change)     MI Intervention: Expressed Empathy/Understanding, Supported Autonomy, Collaboration, Evocation, Open-ended questions, Reflections: simple and complex, and Change talk (evoked)     Change Talk Expressed by the Patient: Ability to change Reasons to change Need to change Committment to change Activation Taking steps    Provider Response to Change Talk: E - Evoked more info from patient about behavior change, A - Affirmed patient's thoughts, decisions, or attempts at behavior change, and R - Reflected patient's change talk     Assessments completed prior to visit: 2/15/21 DA  The following assessments were completed by patient for this visit:      PHQ9:       5/22/2023     9:33 AM 6/5/2023     8:29 AM 7/24/2023     8:44 AM 8/10/2023     2:04 PM 8/28/2023     8:17 AM 9/3/2024     7:00 AM 9/23/2024     2:52 PM   PHQ-9 SCORE   [facility] 7 (Mild depression) 6 (Mild depression) 11 (Moderate depression) 10 (Moderate depression) 8 (Mild  depression) 10 (Moderate depression) 5 (Mild depression)   PHQ-9 Total Score 7 6 11 10 8 10 5     GAD7:       11/14/2022     8:40 AM 11/28/2022     8:14 AM 5/22/2023     9:34 AM 6/5/2023     8:30 AM 7/24/2023     8:45 AM 8/28/2023     8:19 AM 9/23/2024     2:53 PM   AYDIN-7 SCORE   Total Score 7 (mild anxiety) 11 (moderate anxiety) 10 (moderate anxiety) 8 (mild anxiety) 13 (moderate anxiety) 15 (severe anxiety) 7 (mild anxiety)   Total Score 7 11 10 8 13 15 7     PROMIS 10-Global Health (only subscores and total score):       10/3/2022     7:49 AM 10/25/2022     3:59 AM 2/6/2023     9:41 AM 2/20/2023     9:13 AM 5/22/2023     9:35 AM 6/5/2023     8:31 AM 9/23/2024     2:54 PM   PROMIS-10 Scores Only   Global Mental Health Score 12 13 13 13 13 13 13   Global Physical Health Score 18 17 18 17 16 17 17   PROMIS TOTAL - SUBSCORES 30 30 31 30 29 30 30         ASSESSMENT: Current Emotional / Mental Status (status of significant symptoms):   Risk status (Self / Other harm or suicidal ideation)   Patient denies current fears or concerns for personal safety.   Patient denies current or recent suicidal ideation or behaviors.   Patient denies current or recent homicidal ideation or behaviors.   Patient denies current or recent self injurious behavior or ideation.   Patient denies other safety concerns.   Patient reports there has been no change in risk factors since their last session.     Patient reports there has been no change in protective factors since their last session.     Recommended that patient call 911 or go to the local ED should there be a change in any of these risk factors.     Appearance:   Appropriate    Eye Contact:   Good    Psychomotor Behavior: Normal    Attitude:   Cooperative  Friendly   Orientation:   All   Speech    Rate / Production: Normal     Volume:  Normal    Mood:    Anxious  Normal,   Affect:    Appropriate    Thought Content:  Clear    Thought Form:  Coherent  Goal Directed  Logical     Insight:    Good      Medication Review:   No changes to current psychiatric medication(s)     Medication Compliance:   Yes     Changes in Health Issues:   None noted       Chemical Use Review:   Substance Use: Chemical use reviewed, no active concerns identified      Tobacco Use: No current tobacco use.      Diagnosis:  1. Major depressive disorder, recurrent episode, mild with anxious distress (H24)          Collateral Reports Completed:   Not Applicable    PLAN: (Patient Tasks / Therapist Tasks / Other)  Continue using PATRIZIA, FAST and Check the Facts engage intentionally with supportive people, prioritize family/friend  time. Maintain no communication with mother, boundary with stepdad.     DEE Walden [facility] 9/23/2024        _____________________________________________________________  Individual Treatment Plan    Patient's Name: Joey Spears  YOB: 1995    Date of Creation:   9/13/2021  Date Treatment Plan Last Reviewed/Revised: 9/9/24    There has been demonstrated improvement in functioning while patient has been engaged in psychotherapy/psychological service- if withdrawn the patient would deteriorate and/or relapse.     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    Anticipated number of session for this episode of care:   Anticipation frequency of session: Every other week  Anticipated Duration of each session: 38-52 minutes  Treatment plan will be reviewed in 90 days or when goals have been changed.     MeasurableTreatment Goal(s) related to diagnosis / functional impairment(s)  Goal 1: Client will experience an improvement  in mood and anxiety evidenced by self report and AYDIN/PHQ scores of 5 or less    I will know I've met my goal when I'm enjoying my life more (paraphrase) .      Objective #A (Client Action)    Client will Increase interest, engagement, and pleasure in doing things  Decrease frequency and intensity of feeling down, depressed, hopeless  Improve quantity and quality of night time sleep / decrease daytime naps  Feel less tired and more energy during the day   Identify negative self-talk and behaviors: challenge core beliefs, myths, and actions  Improve concentration, focus, and mindfulness in daily activities   Feel less fidgety, restless or slow in daily activities / interpersonal interactions  Use boundaries and proactive communication in relationships.  Status:    9/9/2024    Intervention(s)  Therapist will teach behavioral activation, sleep hygiene, CBT, DBT and ACT skills, proactive communication, mindfulness, grounding skills to support mood improvement and anxiety reduction.    Patient has reviewed and agreed to the above plan.      DEE Walden,  LICSW 9/9/2024      Answers for HPI/ROS submitted by the patient on 8/16/2022  If you checked off any problems, how difficult have these problems made it for you to do your work, take care of things at home, or get along with other people?: Somewhat difficult  PHQ9 TOTAL SCORE: 8  AYDIN 7 TOTAL SCORE: 12

## 2024-10-07 ENCOUNTER — VIRTUAL VISIT (OUTPATIENT)
Dept: PSYCHOLOGY | Facility: CLINIC | Age: 29
End: 2024-10-07
Payer: COMMERCIAL

## 2024-10-07 ENCOUNTER — VIRTUAL VISIT (OUTPATIENT)
Dept: FAMILY MEDICINE | Facility: CLINIC | Age: 29
End: 2024-10-07
Payer: COMMERCIAL

## 2024-10-07 DIAGNOSIS — F33.0 MAJOR DEPRESSIVE DISORDER, RECURRENT EPISODE, MILD WITH ANXIOUS DISTRESS (H): Primary | ICD-10-CM

## 2024-10-07 DIAGNOSIS — F41.1 ANXIETY STATE: Primary | ICD-10-CM

## 2024-10-07 PROCEDURE — 99214 OFFICE O/P EST MOD 30 MIN: CPT | Mod: 95 | Performed by: FAMILY MEDICINE

## 2024-10-07 PROCEDURE — G2211 COMPLEX E/M VISIT ADD ON: HCPCS | Mod: 95 | Performed by: FAMILY MEDICINE

## 2024-10-07 PROCEDURE — 90837 PSYTX W PT 60 MINUTES: CPT | Mod: 95

## 2024-10-07 RX ORDER — GABAPENTIN 300 MG/1
300 CAPSULE ORAL 2 TIMES DAILY
Qty: 180 CAPSULE | Refills: 1 | Status: SHIPPED | OUTPATIENT
Start: 2024-10-07

## 2024-10-07 ASSESSMENT — ANXIETY QUESTIONNAIRES
4. TROUBLE RELAXING: MORE THAN HALF THE DAYS
3. WORRYING TOO MUCH ABOUT DIFFERENT THINGS: SEVERAL DAYS
6. BECOMING EASILY ANNOYED OR IRRITABLE: SEVERAL DAYS
IF YOU CHECKED OFF ANY PROBLEMS ON THIS QUESTIONNAIRE, HOW DIFFICULT HAVE THESE PROBLEMS MADE IT FOR YOU TO DO YOUR WORK, TAKE CARE OF THINGS AT HOME, OR GET ALONG WITH OTHER PEOPLE: SOMEWHAT DIFFICULT
8. IF YOU CHECKED OFF ANY PROBLEMS, HOW DIFFICULT HAVE THESE MADE IT FOR YOU TO DO YOUR WORK, TAKE CARE OF THINGS AT HOME, OR GET ALONG WITH OTHER PEOPLE?: SOMEWHAT DIFFICULT
1. FEELING NERVOUS, ANXIOUS, OR ON EDGE: SEVERAL DAYS
7. FEELING AFRAID AS IF SOMETHING AWFUL MIGHT HAPPEN: NOT AT ALL
GAD7 TOTAL SCORE: 7
2. NOT BEING ABLE TO STOP OR CONTROL WORRYING: SEVERAL DAYS
7. FEELING AFRAID AS IF SOMETHING AWFUL MIGHT HAPPEN: NOT AT ALL
5. BEING SO RESTLESS THAT IT IS HARD TO SIT STILL: SEVERAL DAYS
GAD7 TOTAL SCORE: 7
GAD7 TOTAL SCORE: 7

## 2024-10-07 NOTE — PROGRESS NOTES
[facility]                                     Progress Note    Patient Name: Joey Spears  Date:   10/7/2024        Service Type: Individual      Session Start Time: 3:01 pm  Session End Time: 3:54 pm     Session Length: 53 min    Session #: 56    Attendees: Client attended alone    Service Modality:    Telemedicine Visit: The patient's condition can be safely assessed and treated via synchronous audio and visual telemedicine encounter.      Reason for Telemedicine Visit: Patient has requested telehealth visit    Originating Site (Patient Location): Patient's home    Distant Site (Provider Location): Provider Remote Setting- Home Office    Consent:  The patient/guardian has verbally consented to: the potential risks and benefits of telemedicine (video visit) versus in person care; bill my insurance or make self-payment for services provided; and responsibility for payment of non-covered services.     Mode of Communication:  Video Conference via [employer]    As the provider I attest to compliance with applicable laws and regulations related to telemedicine.    DATA  Interactive Complexity: No  Crisis: No      Progress Since Last Session (Related to Symptoms / Goals / Homework):  Symptoms:  Improving reducing situational stressors    Homework: Achieved / completed to satisfaction      Episode of Care Goals: Satisfactory progress - ACTION (Actively working towards change); Intervened by reinforcing change plan / affirming steps taken     Current / Ongoing Stressors and Concerns:  Improving work stability, financial stability. Focus on boundaries, self care to support wellbeing at work/in personal life. Sister discharged from IP eating disorder.  Ongoing No contact with mom, managing contact with stepdad. Continuing focus on positive-productive relationships/activities that support wellness, learning to prioritize personal needs.       Treatment Objective(s) Addressed in This  Session:   Use boundaries and proactive communication in relationships.    Improve quantity and quality of night time sleep / decrease daytime naps   Identify negative self-talk and behaviors: challenge core beliefs, myths, and actions  Improve concentration, focus, and mindfulness in daily activities   Feel less fidgety, restless or slow in daily activities / interpersonal interactions    Interventions:  Supportive Therapy: Provided active listening and validation. Surfaced need for balance in home responsibility, especially with travel and wedding planning  Motivational Interviewing  Target Behavior: continue using and augmenting   boundaries, proactive communication skills to get needs met , continue no contact with mom, focusing on healthy and supportive relationships, creating personal time and space.   Stage of Change: ACTION (Actively working towards change)     MI Intervention: Expressed Empathy/Understanding, Supported Autonomy, Collaboration, Evocation, Open-ended questions, Reflections: simple and complex, and Change talk (evoked)     Change Talk Expressed by the Patient: Ability to change Reasons to change Need to change Committment to change Activation Taking steps    Provider Response to Change Talk: E - Evoked more info from patient about behavior change, A - Affirmed patient's thoughts, decisions, or attempts at behavior change, and R - Reflected patient's change talk     Assessments completed prior to visit: 2/15/21 DA  The following assessments were completed by patient for this visit:      PHQ9:       5/22/2023     9:33 AM 6/5/2023     8:29 AM 7/24/2023     8:44 AM 8/10/2023     2:04 PM 8/28/2023     8:17 AM 9/3/2024     7:00 AM 9/23/2024     2:52 PM   PHQ-9 SCORE   PHQ-9 Total Score MyChart 7 (Mild depression) 6 (Mild depression) 11 (Moderate depression) 10 (Moderate depression) 8 (Mild depression) 10 (Moderate depression) 5 (Mild depression)   PHQ-9 Total Score 7 6 11 10 8 10 5     GAD7:        11/14/2022     8:40 AM 11/28/2022     8:14 AM 5/22/2023     9:34 AM 6/5/2023     8:30 AM 7/24/2023     8:45 AM 8/28/2023     8:19 AM 9/23/2024     2:53 PM   AYDIN-7 SCORE   Total Score 7 (mild anxiety) 11 (moderate anxiety) 10 (moderate anxiety) 8 (mild anxiety) 13 (moderate anxiety) 15 (severe anxiety) 7 (mild anxiety)   Total Score 7 11 10 8 13 15 7     PROMIS 10-Global Health (only subscores and total score):       10/3/2022     7:49 AM 10/25/2022     3:59 AM 2/6/2023     9:41 AM 2/20/2023     9:13 AM 5/22/2023     9:35 AM 6/5/2023     8:31 AM 9/23/2024     2:54 PM   PROMIS-10 Scores Only   Global Mental Health Score 12 13 13 13 13 13 13   Global Physical Health Score 18 17 18 17 16 17 17   PROMIS TOTAL - SUBSCORES 30 30 31 30 29 30 30         ASSESSMENT: Current Emotional / Mental Status (status of significant symptoms):   Risk status (Self / Other harm or suicidal ideation)   Patient denies current fears or concerns for personal safety.   Patient denies current or recent suicidal ideation or behaviors.   Patient denies current or recent homicidal ideation or behaviors.   Patient denies current or recent self injurious behavior or ideation.   Patient denies other safety concerns.   Patient reports there has been no change in risk factors since their last session.     Patient reports there has been no change in protective factors since their last session.     Recommended that patient call 911 or go to the local ED should there be a change in any of these risk factors.     Appearance:   Appropriate    Eye Contact:   Good    Psychomotor Behavior: Normal    Attitude:   Cooperative  Friendly   Orientation:   All   Speech    Rate / Production: Normal     Volume:  Normal    Mood:    Anxious  Normal,   Affect:    Appropriate    Thought Content:  Clear    Thought Form:  Coherent  Goal Directed  Logical    Insight:    Good      Medication Review:   No changes to current psychiatric medication(s)     Medication  Compliance:   Yes     Changes in Health Issues:   None noted       Chemical Use Review:   Substance Use: Chemical use reviewed, no active concerns identified      Tobacco Use: No current tobacco use.      Diagnosis:  1. Major depressive disorder, recurrent episode, mild with anxious distress (H)          Collateral Reports Completed:   Not Applicable    PLAN: (Patient Tasks / Therapist Tasks / Other)  Continue using PATRIZIA, FAST and Check the Facts engage intentionally with supportive people, prioritize family/friend  time. Maintain no communication with mother, appropriate boundary with stepdad, other individuals who require it.     DEE Walden [facility] 10/7/2024        _____________________________________________________________  Individual Treatment Plan    Patient's Name: Joey Spears  YOB: 1995    Date of Creation:   9/13/2021  Date Treatment Plan Last Reviewed/Revised: 9/9/24    There has been demonstrated improvement in functioning while patient has been engaged in psychotherapy/psychological service- if withdrawn the patient would deteriorate and/or relapse.     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    Anticipated number of session for this episode of care:   Anticipation frequency of session: Every other week  Anticipated Duration of each session: 38-52 minutes  Treatment plan will be reviewed in 90 days or when goals have been changed.     MeasurableTreatment Goal(s) related to diagnosis / functional impairment(s)  Goal 1: Client will experience an improvement in mood and anxiety evidenced by self report and AYDIN/PHQ scores  of 5 or less    I will know I've met my goal when I'm enjoying my life more (paraphrase) .      Objective #A (Client Action)    Client will Increase interest, engagement, and pleasure in doing things  Decrease frequency and intensity of feeling down, depressed, hopeless  Improve quantity and quality of night time sleep / decrease daytime naps  Feel less tired and more energy during the day   Identify negative self-talk and behaviors: challenge core beliefs, myths, and actions  Improve concentration, focus, and mindfulness in daily activities   Feel less fidgety, restless or slow in daily activities / interpersonal interactions  Use boundaries and proactive communication in relationships.  Status:    9/9/2024    Intervention(s)  Therapist will teach behavioral activation, sleep hygiene, CBT, DBT and ACT skills, proactive communication, mindfulness, grounding skills to support mood improvement and anxiety reduction.    Patient has reviewed and agreed to the above plan.      DEE Walden,  LICSW 9/9/2024      Answers for HPI/ROS submitted by the patient on 8/16/2022  If you checked off any problems, how difficult have these problems made it for you to do your work, take care of things at home, or get along with other people?: Somewhat difficult  PHQ9 TOTAL SCORE: 8  AYDIN 7 TOTAL SCORE: 12

## 2024-10-07 NOTE — PROGRESS NOTES
Joey is a 29 year old who is being evaluated via a billable video visit.    How would you like to obtain your AVS? MyChart  If the video visit is dropped, the invitation should be resent by: Text to cell phone: [phone]  Will anyone else be joining your video visit? No      Assessment & Plan        Moderate episode of recurrent major depressive disorder (H)  Anxiety   Depression is well-controlled.  Anxiety is improving.  Increase gabapentin to 300 mg twice daily.  Continue Wellbutrin 300 mg daily, BuSpar 10 mg twice daily.      Depression is well controlled.   Anxiety is worse.    Continue wellbutrin 300mg daily which has been working well for depression  buspar 10mg twice daily was providing some benefit for anxiety. She increased the buspar to 15mg twice daily on her own and did not notice additional benefit. Will continue buspar 10mg twice daily  She reports drinking more alcohol and using cannabis to cope with anxiety and is adamant about prn medication-not interested in selective serotonin reuptake inhibitor or SNRI.   Offered hydroxyzine, but it make her too sleepy, even at 12.5mg.   She requests ativan because a friend takes it for anxiety, but I discussed I do not recommend benzo in general because it is habit forming. I have additional concern about using habit-forming medication given her report of increased alcohol use.   Discussed trial of gabapentin 300mg daily for anxiety - with additional possible benefit for reduction of alcohol use. She would like to try gabapentin. In addition, I have referred her to collaborative care psychiatry.   Continues to see her therapist   Follow-up with me in 2 to 4 weeks.      She did not respond to fluoxetine previously and was hospitalized with suicidal ideation after fluoxetine was started when she was in high school. She is very reluctant to try other medications.   Has therapy appt scheduled in May       Contraception  Doing well on OCP-has had some light  spotting intermittently, discussed this is common in the first few months on birth control pill.  She would like to continue it for now.    She denies smoking cigarettes.     From last visit:  Has ParaGard IUD in place since 2018-strings visible on exam today  She is interested in switching to hormonal birth control to reduce or eliminate periods.  Discussed options and she is most interested in OCP at this time.  She has tolerated it well in the past.  Discussed potential benefits/side effects.   Start Seasonale.  She may consider Mirena IUD as well.   Had implanon placed in the past, but it was retrievable when it was time for removal.   She tolerated depo well in the past and would be open to using depo again as well.   Discussed she can keep the ParaGard IUD in for now as she considers the best future option.  She does not wish to have any children.              Myrna Cook is a 29 year old, presenting for the following health issues:  RECHECK    History of Present Illness       Mental Health Follow-up:  Patient presents to follow-up on Depression & Anxiety.Patient's depression since last visit has been:  Better  The patient is not having other symptoms associated with depression.  Patient's anxiety since last visit has been:  Better  The patient is not having other symptoms associated with anxiety.  Any significant life events: No  Patient is feeling anxious or having panic attacks.  Patient has no concerns about alcohol or drug use.    She eats 2-3 servings of fruits and vegetables daily.She consumes 3 sweetened beverage(s) daily.She exercises with enough effort to increase her heart rate 30 to 60 minutes per day.  She exercises with enough effort to increase her heart rate 5 days per week. She is missing 1 dose(s) of medications per week.  She is not taking prescribed medications regularly due to remembering to take.     She has found gabapentin helpful for her anxiety.  She was taking it at bedtime,  "which was helping for sleep, but because daytime symptoms were worse, she switched it to the morning.  She wonders if she can increase it to twice a day.  She has also cut back on alcohol.  Now drinking just on the weekends and having 2-3 drinks on those days        Objective    Vitals - Patient Reported  Height (Patient Reported): 158.8 cm (5' 2.5\")      Vitals:  No vitals were obtained today due to virtual visit.    Physical Exam   GENERAL: alert and no distress  EYES: Eyes grossly normal to inspection.  No discharge or erythema, or obvious scleral/conjunctival abnormalities.  RESP: No audible wheeze, cough, or visible cyanosis.    SKIN: Visible skin clear. No significant rash, abnormal pigmentation or lesions.  NEURO: Cranial nerves grossly intact.  Mentation and speech appropriate for age.  PSYCH: Appropriate affect, tone, and pace of words    Office Visit on 09/03/2024   Component Date Value Ref Range Status    Human Papilloma Virus 16 DNA 09/03/2024 Negative  Negative Final    Human Papilloma Virus 18 DNA 09/03/2024 Negative  Negative Final    Human Papilloma Virus Other 09/03/2024 Negative  Negative Final    FINAL DIAGNOSIS 09/03/2024    Final                    Value:This result contains rich text formatting which cannot be displayed here.    Interpretation 09/03/2024 Negative for Intraepithelial Lesion or Malignancy (NILM)    Final    Comment 09/03/2024    Final                    Value:This result contains rich text formatting which cannot be displayed here.    Specimen Adequacy 09/03/2024 Satisfactory for evaluation, endocervical/transformation zone component absent   Final    Clinical Information 09/03/2024    Final                    Value:This result contains rich text formatting which cannot be displayed here.    Previous Abnormal? 09/03/2024    Final                    Value:This result contains rich text formatting which cannot be displayed here.    Performing Labs 09/03/2024    Final                "     Value:This result contains rich text formatting which cannot be displayed here.    Associated HPV Report 09/03/2024    Final                    Value:This result contains rich text formatting which cannot be displayed here.         Video-Visit Details    Type of service:  Video Visit   Originating Location (pt. Location): Home    Distant Location (provider location):  On-site  Platform used for Video Visit: Jez  Signed Electronically by: Kaci Villar MD, MD

## 2024-10-28 ENCOUNTER — VIRTUAL VISIT (OUTPATIENT)
Dept: PSYCHOLOGY | Facility: CLINIC | Age: 29
End: 2024-10-28
Payer: COMMERCIAL

## 2024-10-28 DIAGNOSIS — F33.0 MAJOR DEPRESSIVE DISORDER, RECURRENT EPISODE, MILD WITH ANXIOUS DISTRESS (H): Primary | ICD-10-CM

## 2024-10-28 PROCEDURE — 90837 PSYTX W PT 60 MINUTES: CPT | Mod: 95

## 2024-10-28 NOTE — PROGRESS NOTES
[facility]                                     Progress Note    Patient Name: Joey Spears  Date:   10/28/2024        Service Type: Individual      Session Start Time: 3:02 pm  Session End Time: 3:55 pm     Session Length: 53 min    Session #: 57    Attendees: Client attended alone    Service Modality:    Telemedicine Visit: The patient's condition can be safely assessed and treated via synchronous audio and visual telemedicine encounter.      Reason for Telemedicine Visit: Patient has requested telehealth visit    Originating Site (Patient Location): Patient's home    Distant Site (Provider Location): Provider Remote Setting- Home Office    Consent:  The patient/guardian has verbally consented to: the potential risks and benefits of telemedicine (video visit) versus in person care; bill my insurance or make self-payment for services provided; and responsibility for payment of non-covered services.     Mode of Communication:  Video Conference via Signdat    As the provider I attest to compliance with applicable laws and regulations related to telemedicine.    DATA  Interactive Complexity: No  Crisis: No      Progress Since Last Session (Related to Symptoms / Goals / Homework):  Symptoms:  Improving reducing situational stressors    Homework: Achieved / completed to satisfaction      Episode of Care Goals: Satisfactory progress - ACTION (Actively working towards change); Intervened by reinforcing change plan / affirming steps taken     Current / Ongoing Stressors and Concerns:  Improving work stability, financial stability. Got engaged. Focus on boundaries, self care to support wellbeing at work/in personal life. Sister ED recovery, return to school.  Ongoing No contact with mom, managing contact with stepdad. Continuing focus on positive-productive relationships/activities that support wellness, learning to prioritize personal needs.       Treatment Objective(s) Addressed in  This Session:   Use boundaries and proactive communication in relationships.    Improve quantity and quality of night time sleep / decrease daytime naps   Identify negative self-talk and behaviors: challenge core beliefs, myths, and actions  Improve concentration, focus, and mindfulness in daily activities   Feel less fidgety, restless or slow in daily activities / interpersonal interactions    Interventions:  Supportive Therapy: Provided active listening and validation. Reflected on growing comfort with mother boundary and personal needs  Motivational Interviewing  Target Behavior: continue using   proactive communication skills to get needs met , continue no contact with mom, focusing on healthy and supportive relationships, creating personal time and space.   Stage of Change: ACTION (Actively working towards change)     MI Intervention: Expressed Empathy/Understanding, Supported Autonomy, Collaboration, Evocation, Open-ended questions, Reflections: simple and complex, and Change talk (evoked)     Change Talk Expressed by the Patient: Ability to change Reasons to change Need to change Committment to change Activation Taking steps    Provider Response to Change Talk: E - Evoked more info from patient about behavior change, A - Affirmed patient's thoughts, decisions, or attempts at behavior change, and R - Reflected patient's change talk     Assessments completed prior to visit: 2/15/21   The following assessments were completed by patient for this visit:      PHQ9:       5/22/2023     9:33 AM 6/5/2023     8:29 AM 7/24/2023     8:44 AM 8/10/2023     2:04 PM 8/28/2023     8:17 AM 9/3/2024     7:00 AM 9/23/2024     2:52 PM   PHQ-9 SCORE   PHQ-9 Total Score MyChart 7 (Mild depression) 6 (Mild depression) 11 (Moderate depression) 10 (Moderate depression) 8 (Mild depression) 10 (Moderate depression) 5 (Mild depression)   PHQ-9 Total Score 7 6 11 10 8 10 5     GAD7:       11/28/2022     8:14 AM 5/22/2023     9:34 AM  6/5/2023     8:30 AM 7/24/2023     8:45 AM 8/28/2023     8:19 AM 9/23/2024     2:53 PM 10/7/2024     4:50 PM   AYDIN-7 SCORE   Total Score 11 (moderate anxiety) 10 (moderate anxiety) 8 (mild anxiety) 13 (moderate anxiety) 15 (severe anxiety) 7 (mild anxiety) 7 (mild anxiety)   Total Score 11 10 8 13 15 7 7     PROMIS 10-Global Health (only subscores and total score):       10/3/2022     7:49 AM 10/25/2022     3:59 AM 2/6/2023     9:41 AM 2/20/2023     9:13 AM 5/22/2023     9:35 AM 6/5/2023     8:31 AM 9/23/2024     2:54 PM   PROMIS-10 Scores Only   Global Mental Health Score 12 13 13 13 13 13 13   Global Physical Health Score 18 17 18 17 16 17 17   PROMIS TOTAL - SUBSCORES 30 30 31 30 29 30 30         ASSESSMENT: Current Emotional / Mental Status (status of significant symptoms):   Risk status (Self / Other harm or suicidal ideation)   Patient denies current fears or concerns for personal safety.   Patient denies current or recent suicidal ideation or behaviors.   Patient denies current or recent homicidal ideation or behaviors.   Patient denies current or recent self injurious behavior or ideation.   Patient denies other safety concerns.   Patient reports there has been no change in risk factors since their last session.     Patient reports there has been no change in protective factors since their last session.     Recommended that patient call 911 or go to the local ED should there be a change in any of these risk factors.     Appearance:   Appropriate    Eye Contact:   Good    Psychomotor Behavior: Normal    Attitude:   Cooperative  Friendly   Orientation:   All   Speech    Rate / Production: Normal     Volume:  Normal    Mood:    Anxious  Normal,   Affect:    Appropriate    Thought Content:  Clear    Thought Form:  Coherent  Goal Directed  Logical    Insight:    Good      Medication Review:   No changes to current psychiatric medication(s)     Medication Compliance:   Yes     Changes in Health Issues:   None  noted       Chemical Use Review:   Substance Use: Chemical use reviewed, no active concerns identified      Tobacco Use: No current tobacco use.      Diagnosis:  1. Major depressive disorder, recurrent episode, mild with anxious distress (H)          Collateral Reports Completed:   Not Applicable    PLAN: (Patient Tasks / Therapist Tasks / Other)  Continue using PATRIZIA, FAST and Check the Facts engage intentionally with supportive people, prioritize family/friend  time. Maintain no communication with mother, appropriate boundary with stepdad, other individuals who require it.     DEE Walden [facility] 10/28/2024        _____________________________________________________________  Individual Treatment Plan    Patient's Name: Joey Spears  YOB: 1995    Date of Creation:   9/13/2021  Date Treatment Plan Last Reviewed/Revised: 9/9/24    There has been demonstrated improvement in functioning while patient has been engaged in psychotherapy/psychological service- if withdrawn the patient would deteriorate and/or relapse.     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    Anticipated number of session for this episode of care:   Anticipation frequency of session: Every other week  Anticipated Duration of each session: 38-52 minutes  Treatment plan will be reviewed in 90 days or when goals have been changed.     MeasurableTreatment Goal(s) related to diagnosis / functional impairment(s)  Goal 1: Client will experience an improvement in mood and anxiety evidenced by self report and AYDIN/PHQ scores of 5 or less    I will know I've met my goal when I'm  enjoying my life more (paraphrase) .      Objective #A (Client Action)    Client will Increase interest, engagement, and pleasure in doing things  Decrease frequency and intensity of feeling down, depressed, hopeless  Improve quantity and quality of night time sleep / decrease daytime naps  Feel less tired and more energy during the day   Identify negative self-talk and behaviors: challenge core beliefs, myths, and actions  Improve concentration, focus, and mindfulness in daily activities   Feel less fidgety, restless or slow in daily activities / interpersonal interactions  Use boundaries and proactive communication in relationships.  Status:    9/9/2024    Intervention(s)  Therapist will teach behavioral activation, sleep hygiene, CBT, DBT and ACT skills, proactive communication, mindfulness, grounding skills to support mood improvement and anxiety reduction.    Patient has reviewed and agreed to the above plan.      DEE Walden,  LICSW 9/9/2024      Answers for HPI/ROS submitted by the patient on 8/16/2022  If you checked off any problems, how difficult have these problems made it for you to do your work, take care of things at home, or get along with other people?: Somewhat difficult  PHQ9 TOTAL SCORE: 8  AYDIN 7 TOTAL SCORE: 12

## 2024-11-04 ENCOUNTER — VIRTUAL VISIT (OUTPATIENT)
Dept: PSYCHOLOGY | Facility: CLINIC | Age: 29
End: 2024-11-04
Payer: COMMERCIAL

## 2024-11-04 DIAGNOSIS — F33.0 MAJOR DEPRESSIVE DISORDER, RECURRENT EPISODE, MILD WITH ANXIOUS DISTRESS (H): Primary | ICD-10-CM

## 2024-11-04 PROCEDURE — 90837 PSYTX W PT 60 MINUTES: CPT | Mod: 95

## 2024-11-04 NOTE — PROGRESS NOTES
[facility]                                     Progress Note    Patient Name: Joey Spears  Date:   11/4/2024        Service Type: Individual      Session Start Time: 4:02 pm  Session End Time: 4:55 pm     Session Length: 53 min    Session #: 58    Attendees: Client attended alone    Service Modality:    Telemedicine Visit: The patient's condition can be safely assessed and treated via synchronous audio and visual telemedicine encounter.      Reason for Telemedicine Visit: Patient has requested telehealth visit    Originating Site (Patient Location): Patient's home    Distant Site (Provider Location): Provider Remote Setting- Home Office    Consent:  The patient/guardian has verbally consented to: the potential risks and benefits of telemedicine (video visit) versus in person care; bill my insurance or make self-payment for services provided; and responsibility for payment of non-covered services.     Mode of Communication:  Video Conference via Playsino    As the provider I attest to compliance with applicable laws and regulations related to telemedicine.    DATA  Interactive Complexity: No  Crisis: No      Progress Since Last Session (Related to Symptoms / Goals / Homework):  Symptoms:  Improving reducing situational stressors    Homework: Achieved / completed to satisfaction      Episode of Care Goals: Satisfactory progress - ACTION (Actively working towards change); Intervened by reinforcing change plan / affirming steps taken     Current / Ongoing Stressors and Concerns:  Improving work stability, financial stability. Got engaged, some talk on planning. Focus on boundaries, self care to support wellbeing at work/in personal life. Sister ED recovery, return to school.  Ongoing No contact with mom, managing contact with stepdad. Continuing focus on positive-productive relationships/activities that support wellness, learning to prioritize personal needs.       Treatment  Objective(s) Addressed in This Session:   Use boundaries and proactive communication in relationships.    Improve quantity and quality of night time sleep / decrease daytime naps   Identify negative self-talk and behaviors: challenge core beliefs, myths, and actions  Improve concentration, focus, and mindfulness in daily activities   Feel less fidgety, restless or slow in daily activities / interpersonal interactions    Interventions:  Supportive Therapy: Provided active listening and validation. Reflected on growing comfort with mother boundary and personal needs  Motivational Interviewing  Target Behavior: continue using   proactive communication skills to get needs met , continue no contact with mom, focusing on healthy and supportive relationships, creating personal time and space.   Stage of Change: ACTION (Actively working towards change)     MI Intervention: Expressed Empathy/Understanding, Supported Autonomy, Collaboration, Evocation, Open-ended questions, Reflections: simple and complex, and Change talk (evoked)     Change Talk Expressed by the Patient: Ability to change Reasons to change Need to change Committment to change Activation Taking steps    Provider Response to Change Talk: E - Evoked more info from patient about behavior change, A - Affirmed patient's thoughts, decisions, or attempts at behavior change, and R - Reflected patient's change talk     Assessments completed prior to visit: 2/15/21 DA  The following assessments were completed by patient for this visit:      PHQ9:       5/22/2023     9:33 AM 6/5/2023     8:29 AM 7/24/2023     8:44 AM 8/10/2023     2:04 PM 8/28/2023     8:17 AM 9/3/2024     7:00 AM 9/23/2024     2:52 PM   PHQ-9 SCORE   PHQ-9 Total Score MyChart 7 (Mild depression) 6 (Mild depression) 11 (Moderate depression) 10 (Moderate depression) 8 (Mild depression) 10 (Moderate depression) 5 (Mild depression)   PHQ-9 Total Score 7 6 11 10 8 10 5     GAD7:       11/28/2022     8:14 AM  5/22/2023     9:34 AM 6/5/2023     8:30 AM 7/24/2023     8:45 AM 8/28/2023     8:19 AM 9/23/2024     2:53 PM 10/7/2024     4:50 PM   AYDIN-7 SCORE   Total Score 11 (moderate anxiety) 10 (moderate anxiety) 8 (mild anxiety) 13 (moderate anxiety) 15 (severe anxiety) 7 (mild anxiety) 7 (mild anxiety)   Total Score 11 10 8 13 15 7 7     PROMIS 10-Global Health (only subscores and total score):       10/3/2022     7:49 AM 10/25/2022     3:59 AM 2/6/2023     9:41 AM 2/20/2023     9:13 AM 5/22/2023     9:35 AM 6/5/2023     8:31 AM 9/23/2024     2:54 PM   PROMIS-10 Scores Only   Global Mental Health Score 12 13 13 13 13 13 13   Global Physical Health Score 18 17 18 17 16 17 17   PROMIS TOTAL - SUBSCORES 30 30 31 30 29 30 30         ASSESSMENT: Current Emotional / Mental Status (status of significant symptoms):   Risk status (Self / Other harm or suicidal ideation)   Patient denies current fears or concerns for personal safety.   Patient denies current or recent suicidal ideation or behaviors.   Patient denies current or recent homicidal ideation or behaviors.   Patient denies current or recent self injurious behavior or ideation.   Patient denies other safety concerns.   Patient reports there has been no change in risk factors since their last session.     Patient reports there has been no change in protective factors since their last session.     Recommended that patient call 911 or go to the local ED should there be a change in any of these risk factors.     Appearance:   Appropriate    Eye Contact:   Good    Psychomotor Behavior: Normal    Attitude:   Cooperative  Friendly   Orientation:   All   Speech    Rate / Production: Normal     Volume:  Normal    Mood:    Anxious  Normal,   Affect:    Appropriate    Thought Content:  Clear    Thought Form:  Coherent  Goal Directed  Logical    Insight:    Good      Medication Review:   No changes to current psychiatric medication(s)     Medication Compliance:   Yes     Changes in  Health Issues:   None noted       Chemical Use Review:   Substance Use: Chemical use reviewed, no active concerns identified      Tobacco Use: No current tobacco use.      Diagnosis:  1. Major depressive disorder, recurrent episode, mild with anxious distress (H)          Collateral Reports Completed:   Not Applicable    PLAN: (Patient Tasks / Therapist Tasks / Other)  Continue using PATRIZIA, FAST and Check the Facts engage intentionally with supportive people, prioritize family/friend  time. Maintain no communication with mother, appropriate boundary with stepdad, other individuals who require it.     DEE Walden [facility] 11/4/2024        _____________________________________________________________  Individual Treatment Plan    Patient's Name: Joey Spears  YOB: 1995    Date of Creation:   9/13/2021  Date Treatment Plan Last Reviewed/Revised: 9/9/24    There has been demonstrated improvement in functioning while patient has been engaged in psychotherapy/psychological service- if withdrawn the patient would deteriorate and/or relapse.     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    Anticipated number of session for this episode of care:   Anticipation frequency of session: Every other week  Anticipated Duration of each session: 38-52 minutes  Treatment plan will be reviewed in 90 days or when goals have been changed.     MeasurableTreatment Goal(s) related to diagnosis / functional impairment(s)  Goal 1: Client will experience an improvement in mood and anxiety evidenced by self report and AYDIN/PHQ scores of 5 or less    I will know I've  met my goal when I'm enjoying my life more (paraphrase) .      Objective #A (Client Action)    Client will Increase interest, engagement, and pleasure in doing things  Decrease frequency and intensity of feeling down, depressed, hopeless  Improve quantity and quality of night time sleep / decrease daytime naps  Feel less tired and more energy during the day   Identify negative self-talk and behaviors: challenge core beliefs, myths, and actions  Improve concentration, focus, and mindfulness in daily activities   Feel less fidgety, restless or slow in daily activities / interpersonal interactions  Use boundaries and proactive communication in relationships.  Status:    9/9/2024    Intervention(s)  Therapist will teach behavioral activation, sleep hygiene, CBT, DBT and ACT skills, proactive communication, mindfulness, grounding skills to support mood improvement and anxiety reduction.    Patient has reviewed and agreed to the above plan.      DEE Walden,  [facility] 9/9/2024      Answers for HPI/ROS submitted by the patient on 8/16/2022  If you checked off any problems, how difficult have these problems made it for you to do your work, take care of things at home, or get along with other people?: Somewhat difficult  PHQ9 TOTAL SCORE: 8  AYDIN 7 TOTAL SCORE: 12

## 2024-11-25 ENCOUNTER — VIRTUAL VISIT (OUTPATIENT)
Dept: PSYCHOLOGY | Facility: CLINIC | Age: 29
End: 2024-11-25
Payer: COMMERCIAL

## 2024-11-25 DIAGNOSIS — F33.0 MAJOR DEPRESSIVE DISORDER, RECURRENT EPISODE, MILD WITH ANXIOUS DISTRESS (H): Primary | ICD-10-CM

## 2024-11-25 PROCEDURE — 90837 PSYTX W PT 60 MINUTES: CPT | Mod: 95

## 2024-11-25 NOTE — PROGRESS NOTES
[facility]                                     Progress Note    Patient Name: Joey Spears  Date:   11/25/2024        Service Type: Individual      Session Start Time: 4:02 pm  Session End Time: 4:55 pm     Session Length: 53 min    Session #: 59    Attendees: Client attended alone    Service Modality:    Telemedicine Visit: The patient's condition can be safely assessed and treated via synchronous audio and visual telemedicine encounter.      Reason for Telemedicine Visit: Patient has requested telehealth visit    Originating Site (Patient Location): Patient's home    Distant Site (Provider Location): Provider Remote Setting- Home Office    Consent:  The patient/guardian has verbally consented to: the potential risks and benefits of telemedicine (video visit) versus in person care; bill my insurance or make self-payment for services provided; and responsibility for payment of non-covered services.     Mode of Communication:  Video Conference via "[employer]    As the provider I attest to compliance with applicable laws and regulations related to telemedicine.    DATA  Interactive Complexity: No  Crisis: No      Progress Since Last Session (Related to Symptoms / Goals / Homework):  Symptoms:  Improving reducing situational stressors    Homework: Achieved / completed to satisfaction      Episode of Care Goals: Satisfactory progress - ACTION (Actively working towards change); Intervened by reinforcing change plan / affirming steps taken     Current / Ongoing Stressors and Concerns:  Improving work stability, financial stability. Got engaged, some talk on planning. Focus on boundaries, self care to support wellbeing at work/in personal life. Sister ED recovery, return to school.  Ongoing No contact with mom, managing contact with stepdad. Continuing focus on positive-productive relationships/activities that support wellness, learning to prioritize personal needs.       Treatment  Objective(s) Addressed in This Session:   Use boundaries and proactive communication in relationships.    Improve quantity and quality of night time sleep / decrease daytime naps   Identify negative self-talk and behaviors: challenge core beliefs, myths, and actions  Improve concentration, focus, and mindfulness in daily activities   Feel less fidgety, restless or slow in daily activities / interpersonal interactions    Interventions:  Supportive Therapy: Provided active listening and validation. Reflected on growing comfort with mother boundary and prioritization of personal needs  Motivational Interviewing  Target Behavior: continue using   proactive communication skills to get needs met , continue no contact with mom, focusing on healthy and supportive relationships, creating personal time and space.   Stage of Change: ACTION (Actively working towards change)     MI Intervention: Expressed Empathy/Understanding, Supported Autonomy, Collaboration, Evocation, Open-ended questions, Reflections: simple and complex, and Change talk (evoked)     Change Talk Expressed by the Patient: Ability to change Reasons to change Need to change Committment to change Activation Taking steps    Provider Response to Change Talk: E - Evoked more info from patient about behavior change, A - Affirmed patient's thoughts, decisions, or attempts at behavior change, and R - Reflected patient's change talk     Assessments completed prior to visit: 2/15/21   The following assessments were completed by patient for this visit:      PHQ9:       5/22/2023     9:33 AM 6/5/2023     8:29 AM 7/24/2023     8:44 AM 8/10/2023     2:04 PM 8/28/2023     8:17 AM 9/3/2024     7:00 AM 9/23/2024     2:52 PM   PHQ-9 SCORE   PHQ-9 Total Score MyChart 7 (Mild depression) 6 (Mild depression) 11 (Moderate depression) 10 (Moderate depression) 8 (Mild depression) 10 (Moderate depression) 5 (Mild depression)   PHQ-9 Total Score 7 6 11 10 8 10 5     GAD7:        11/28/2022     8:14 AM 5/22/2023     9:34 AM 6/5/2023     8:30 AM 7/24/2023     8:45 AM 8/28/2023     8:19 AM 9/23/2024     2:53 PM 10/7/2024     4:50 PM   AYDIN-7 SCORE   Total Score 11 (moderate anxiety) 10 (moderate anxiety) 8 (mild anxiety) 13 (moderate anxiety) 15 (severe anxiety) 7 (mild anxiety) 7 (mild anxiety)   Total Score 11 10 8 13 15 7 7     PROMIS 10-Global Health (only subscores and total score):       10/3/2022     7:49 AM 10/25/2022     3:59 AM 2/6/2023     9:41 AM 2/20/2023     9:13 AM 5/22/2023     9:35 AM 6/5/2023     8:31 AM 9/23/2024     2:54 PM   PROMIS-10 Scores Only   Global Mental Health Score 12 13 13 13 13 13 13   Global Physical Health Score 18 17 18 17 16 17 17   PROMIS TOTAL - SUBSCORES 30 30 31 30 29 30 30         ASSESSMENT: Current Emotional / Mental Status (status of significant symptoms):   Risk status (Self / Other harm or suicidal ideation)   Patient denies current fears or concerns for personal safety.   Patient denies current or recent suicidal ideation or behaviors.   Patient denies current or recent homicidal ideation or behaviors.   Patient denies current or recent self injurious behavior or ideation.   Patient denies other safety concerns.   Patient reports there has been no change in risk factors since their last session.     Patient reports there has been no change in protective factors since their last session.     Recommended that patient call 911 or go to the local ED should there be a change in any of these risk factors.     Appearance:   Appropriate    Eye Contact:   Good    Psychomotor Behavior: Normal    Attitude:   Cooperative  Friendly   Orientation:   All   Speech    Rate / Production: Normal     Volume:  Normal    Mood:    Anxious  Normal,   Affect:    Appropriate    Thought Content:  Clear    Thought Form:  Coherent  Goal Directed  Logical    Insight:    Good      Medication Review:   No changes to current psychiatric medication(s)     Medication  Compliance:   Yes     Changes in Health Issues:   None noted       Chemical Use Review:   Substance Use: Chemical use reviewed, no active concerns identified      Tobacco Use: No current tobacco use.      Diagnosis:  1. Major depressive disorder, recurrent episode, mild with anxious distress (H)          Collateral Reports Completed:   Not Applicable    PLAN: (Patient Tasks / Therapist Tasks / Other)  Continue using PATRIZIA, FAST and Check the Facts engage intentionally with supportive people, prioritize family/friend/me  time. Maintain no communication with mother, appropriate boundary with stepdad, other individuals who require it.     DEE Walden [facility] 11/25/2024        _____________________________________________________________  Individual Treatment Plan    Patient's Name: Joey Spears  YOB: 1995    Date of Creation:   9/13/2021  Date Treatment Plan Last Reviewed/Revised: 9/9/24    There has been demonstrated improvement in functioning while patient has been engaged in psychotherapy/psychological service- if withdrawn the patient would deteriorate and/or relapse.     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    Anticipated number of session for this episode of care:   Anticipation frequency of session: Every other week  Anticipated Duration of each session: 38-52 minutes  Treatment plan will be reviewed in 90 days or when goals have been changed.     MeasurableTreatment Goal(s) related to diagnosis / functional impairment(s)  Goal 1: Client will experience an improvement in mood and anxiety evidenced by self report and AYDIN/PHQ  scores of 5 or less    I will know I've met my goal when I'm enjoying my life more (paraphrase) .      Objective #A (Client Action)    Client will Increase interest, engagement, and pleasure in doing things  Decrease frequency and intensity of feeling down, depressed, hopeless  Improve quantity and quality of night time sleep / decrease daytime naps  Feel less tired and more energy during the day   Identify negative self-talk and behaviors: challenge core beliefs, myths, and actions  Improve concentration, focus, and mindfulness in daily activities   Feel less fidgety, restless or slow in daily activities / interpersonal interactions  Use boundaries and proactive communication in relationships.  Status:    9/9/2024    Intervention(s)  Therapist will teach behavioral activation, sleep hygiene, CBT, DBT and ACT skills, proactive communication, mindfulness, grounding skills to support mood improvement and anxiety reduction.    Patient has reviewed and agreed to the above plan.      DEE Walden,  LICSW 9/9/2024      Answers for HPI/ROS submitted by the patient on 8/16/2022  If you checked off any problems, how difficult have these problems made it for you to do your work, take care of things at home, or get along with other people?: Somewhat difficult  PHQ9 TOTAL SCORE: 8  AYDIN 7 TOTAL SCORE: 12

## 2024-12-02 ENCOUNTER — TELEPHONE (OUTPATIENT)
Dept: FAMILY MEDICINE | Facility: CLINIC | Age: 29
End: 2024-12-02

## 2024-12-02 NOTE — TELEPHONE ENCOUNTER
"Re-newel RX request faxed into the clinic today for the gabapentin (NEURONTIN) 300 MG capsule.    It states in the PT's chart \"PT request removal of medication\".    LVM for the PT to return call to clarify if PT is taking the medication and indeed need refills.    Katelyn Diehl MA on 12/2/2024 at 11:34 AM    "

## 2024-12-02 NOTE — TELEPHONE ENCOUNTER
This is a medication refill request from the pharmacy.  Please wait until PT confirms if she is still taking the medication.    Katelyn Diehl MA on 12/2/2024 at 12:03 PM

## 2024-12-09 ENCOUNTER — VIRTUAL VISIT (OUTPATIENT)
Dept: PSYCHOLOGY | Facility: CLINIC | Age: 29
End: 2024-12-09
Payer: COMMERCIAL

## 2024-12-09 DIAGNOSIS — F33.0 MAJOR DEPRESSIVE DISORDER, RECURRENT EPISODE, MILD WITH ANXIOUS DISTRESS (H): Primary | ICD-10-CM

## 2024-12-09 PROCEDURE — 90837 PSYTX W PT 60 MINUTES: CPT | Mod: 95

## 2024-12-09 NOTE — PROGRESS NOTES
[facility]                                     Progress Note    Patient Name: Joey Spears  Date:   12/9/2024        Service Type: Individual      Session Start Time: 3:02 pm  Session End Time: 3:55 pm     Session Length: 53 min    Session #: 60    Attendees: Client attended alone    Service Modality:    Telemedicine Visit: The patient's condition can be safely assessed and treated via synchronous audio and visual telemedicine encounter.      Reason for Telemedicine Visit: Patient has requested telehealth visit    Originating Site (Patient Location): Patient's home    Distant Site (Provider Location): Provider Remote Setting- Home Office    Consent:  The patient/guardian has verbally consented to: the potential risks and benefits of telemedicine (video visit) versus in person care; bill my insurance or make self-payment for services provided; and responsibility for payment of non-covered services.     Mode of Communication:  Video Conference via [employer]    As the provider I attest to compliance with applicable laws and regulations related to telemedicine.    DATA  Interactive Complexity: No  Crisis: No      Progress Since Last Session (Related to Symptoms / Goals / Homework):  Symptoms:  Improving reducing situational stressors    Homework: Achieved / completed to satisfaction      Episode of Care Goals: Satisfactory progress - ACTION (Actively working towards change); Intervened by reinforcing change plan / affirming steps taken     Current / Ongoing Stressors and Concerns:  Managing holiday commitments/overwhelm. Got engaged, some talk on planning. Focus on boundaries, self care to support wellbeing at work/in personal life. Sister ED recovery, return to school. Improving work stability, financial stability.  Ongoing No contact with mom, managing contact with stepdad. Continuing focus on positive-productive relationships/activities that support wellness, learning to  prioritize personal needs.       Treatment Objective(s) Addressed in This Session:   Use boundaries and proactive communication in relationships.    Improve quantity and quality of night time sleep / decrease daytime naps   Identify negative self-talk and behaviors: challenge core beliefs, myths, and actions  Improve concentration, focus, and mindfulness in daily activities   Feel less fidgety, restless or slow in daily activities / interpersonal interactions    Interventions:  Supportive Therapy: Provided active listening and validation. Reflected on growing comfort with mother boundary and prioritization of personal needs  Motivational Interviewing  Target Behavior: continue using   proactive communication skills to get needs met , continue no contact with mom, focusing on healthy and supportive relationships, creating personal time and space.   Stage of Change: ACTION (Actively working towards change)     MI Intervention: Expressed Empathy/Understanding, Supported Autonomy, Collaboration, Evocation, Open-ended questions, Reflections: simple and complex, and Change talk (evoked)     Change Talk Expressed by the Patient: Ability to change Reasons to change Need to change Committment to change Activation Taking steps    Provider Response to Change Talk: E - Evoked more info from patient about behavior change, A - Affirmed patient's thoughts, decisions, or attempts at behavior change, and R - Reflected patient's change talk     Assessments completed prior to visit: 2/15/21 DA  The following assessments were completed by patient for this visit:      PHQ9:       5/22/2023     9:33 AM 6/5/2023     8:29 AM 7/24/2023     8:44 AM 8/10/2023     2:04 PM 8/28/2023     8:17 AM 9/3/2024     7:00 AM 9/23/2024     2:52 PM   PHQ-9 SCORE   PHQ-9 Total Score Joe 7 (Mild depression) 6 (Mild depression) 11 (Moderate depression) 10 (Moderate depression) 8 (Mild depression) 10 (Moderate depression) 5 (Mild depression)   PHQ-9 Total  Score 7 6 11 10 8 10 5     GAD7:       11/28/2022     8:14 AM 5/22/2023     9:34 AM 6/5/2023     8:30 AM 7/24/2023     8:45 AM 8/28/2023     8:19 AM 9/23/2024     2:53 PM 10/7/2024     4:50 PM   AYDIN-7 SCORE   Total Score 11 (moderate anxiety) 10 (moderate anxiety) 8 (mild anxiety) 13 (moderate anxiety) 15 (severe anxiety) 7 (mild anxiety) 7 (mild anxiety)   Total Score 11 10 8 13 15 7 7     PROMIS 10-Global Health (only subscores and total score):       10/3/2022     7:49 AM 10/25/2022     3:59 AM 2/6/2023     9:41 AM 2/20/2023     9:13 AM 5/22/2023     9:35 AM 6/5/2023     8:31 AM 9/23/2024     2:54 PM   PROMIS-10 Scores Only   Global Mental Health Score 12 13 13 13 13 13 13   Global Physical Health Score 18 17 18 17 16 17 17   PROMIS TOTAL - SUBSCORES 30 30 31 30 29 30 30         ASSESSMENT: Current Emotional / Mental Status (status of significant symptoms):   Risk status (Self / Other harm or suicidal ideation)   Patient denies current fears or concerns for personal safety.   Patient denies current or recent suicidal ideation or behaviors.   Patient denies current or recent homicidal ideation or behaviors.   Patient denies current or recent self injurious behavior or ideation.   Patient denies other safety concerns.   Patient reports there has been no change in risk factors since their last session.     Patient reports there has been no change in protective factors since their last session.     Recommended that patient call 911 or go to the local ED should there be a change in any of these risk factors.     Appearance:   Appropriate    Eye Contact:   Good    Psychomotor Behavior: Normal    Attitude:   Cooperative  Friendly   Orientation:   All   Speech    Rate / Production: Normal     Volume:  Normal    Mood:    Anxious  Normal,   Affect:    Appropriate    Thought Content:  Clear    Thought Form:  Coherent  Goal Directed  Logical    Insight:    Good      Medication Review:   No changes to current psychiatric  medication(s)     Medication Compliance:   Yes     Changes in Health Issues:   None noted       Chemical Use Review:   Substance Use: Chemical use reviewed, no active concerns identified      Tobacco Use: No current tobacco use.      Diagnosis:  1. Major depressive disorder, recurrent episode, mild with anxious distress (H)          Collateral Reports Completed:   Not Applicable    PLAN: (Patient Tasks / Therapist Tasks / Other)  Continue using PATRIZIA, FAST and Check the Facts engage intentionally with supportive people, prioritize family/friend/me  time. Maintain no communication with mother, appropriate boundary with stepdad, other individuals who require it.  Continue exercise before work, time for self-limits on socializing that drains energy.    DEE Walden [facility] 12/9/2024        _____________________________________________________________  Individual Treatment Plan    Patient's Name: Joey Spears  YOB: 1995    Date of Creation:   9/13/2021  Date Treatment Plan Last Reviewed/Revised: 9/9/24    There has been demonstrated improvement in functioning while patient has been engaged in psychotherapy/psychological service- if withdrawn the patient would deteriorate and/or relapse.     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    Anticipated number of session for this episode of care:   Anticipation frequency of session: Every other week  Anticipated Duration of each session: 38-52 minutes  Treatment plan will be reviewed in 90 days or when goals have been changed.     MeasurableTreatment Goal(s) related to diagnosis / functional  impairment(s)  Goal 1: Client will experience an improvement in mood and anxiety evidenced by self report and AYDIN/PHQ scores of 5 or less    I will know I've met my goal when I'm enjoying my life more (paraphrase) .      Objective #A (Client Action)    Client will Increase interest, engagement, and pleasure in doing things  Decrease frequency and intensity of feeling down, depressed, hopeless  Improve quantity and quality of night time sleep / decrease daytime naps  Feel less tired and more energy during the day   Identify negative self-talk and behaviors: challenge core beliefs, myths, and actions  Improve concentration, focus, and mindfulness in daily activities   Feel less fidgety, restless or slow in daily activities / interpersonal interactions  Use boundaries and proactive communication in relationships.  Status:    9/9/2024    Intervention(s)  Therapist will teach behavioral activation, sleep hygiene, CBT, DBT and ACT skills, proactive communication, mindfulness, grounding skills to support mood improvement and anxiety reduction.    Patient has reviewed and agreed to the above plan.      DEE Walden,  [facility] 9/9/2024      Answers for HPI/ROS submitted by the patient on 8/16/2022  If you checked off any problems, how difficult have these problems made it for you to do your work, take care of things at home, or get along with other people?: Somewhat difficult  PHQ9 TOTAL SCORE: 8  AYDIN 7 TOTAL SCORE: 12

## 2024-12-11 ENCOUNTER — MYC MEDICAL ADVICE (OUTPATIENT)
Dept: FAMILY MEDICINE | Facility: CLINIC | Age: 29
End: 2024-12-11
Payer: COMMERCIAL

## 2024-12-11 NOTE — TELEPHONE ENCOUNTER
See RN response to patient's mychart message re:gabapentin refill    ELIJAH MadridN RN  [place]

## 2024-12-11 NOTE — TELEPHONE ENCOUNTER
Dr Villar,    How soon do you want to see her again? She writes:    I can t do tomorrow but I have enough refills left on all of my meds until next April anyways.      I think this is a misunderstanding because I just had a virtual visit with Dr. Villar in October and we updated everything then and nothing has changed. My meds are going well and I don t have any other concerns- is there another reason she would need to see me sooner or can I just keep my April appointment?

## 2024-12-11 NOTE — TELEPHONE ENCOUNTER
"Attempted to reach, unable. Left message  to call back. Asked pt to read her mychart and this question given to here there/ I also reminded her an appt is due now with Dr Villar    From 10/7 visit with Dr Villar \"Discussed trial of gabapentin 300mg daily for anxiety - with additional possible benefit for reduction of alcohol use. She would like to try gabapentin. In addition, I have referred her to collaborative care psychiatry.   Continues to see her therapist   Follow-up with me in 2 to 4 weeks. \"     No Cazares RN    "

## 2024-12-12 NOTE — TELEPHONE ENCOUNTER
Writer replied to patient via [employer].  SAMANTHA Jones BSN, PHN, AMB-BC (she/her)  [facility] RN

## 2024-12-12 NOTE — TELEPHONE ENCOUNTER
"April will work well. Please let Joey know. Kaci Villar M.D.        10/7/24:   \"Moderate episode of recurrent major depressive disorder (H)  Anxiety   Depression is well-controlled.  Anxiety is improving.  Increase gabapentin to 300 mg twice daily.  Continue Wellbutrin 300 mg daily, BuSpar 10 mg twice daily.\"  "

## 2025-01-06 ENCOUNTER — VIRTUAL VISIT (OUTPATIENT)
Dept: PSYCHOLOGY | Facility: CLINIC | Age: 30
End: 2025-01-06
Payer: COMMERCIAL

## 2025-01-06 DIAGNOSIS — F33.0 MAJOR DEPRESSIVE DISORDER, RECURRENT EPISODE, MILD WITH ANXIOUS DISTRESS: Primary | ICD-10-CM

## 2025-01-06 PROCEDURE — 90837 PSYTX W PT 60 MINUTES: CPT | Mod: 95

## 2025-01-06 NOTE — PROGRESS NOTES
[facility]                                     Progress Note    Patient Name: Joey Spears  Date:   1/6/2025        Service Type: Individual      Session Start Time: 3:02 pm  Session End Time: 3:55 pm     Session Length: 53 min    Session #: 61    Attendees: Client attended alone    Service Modality:    Telemedicine Visit: The patient's condition can be safely assessed and treated via synchronous audio and visual telemedicine encounter.      Reason for Telemedicine Visit: Patient has requested telehealth visit    Originating Site (Patient Location): Patient's home    Distant Site (Provider Location): Provider Remote Setting- Home Office    Consent:  The patient/guardian has verbally consented to: the potential risks and benefits of telemedicine (video visit) versus in person care; bill my insurance or make self-payment for services provided; and responsibility for payment of non-covered services.     Mode of Communication:  Video Conference via [employer]    As the provider I attest to compliance with applicable laws and regulations related to telemedicine.    DATA  Interactive Complexity: No  Crisis: No      Progress Since Last Session (Related to Symptoms / Goals / Homework):  Symptoms:  Improving reducing situational stressors    Homework: Achieved / completed to satisfaction      Episode of Care Goals: Satisfactory progress - ACTION (Actively working towards change); Intervened by reinforcing change plan / affirming steps taken     Current / Ongoing Stressors and Concerns:  Partner mental health concerns. Got engaged, some talk on planning. Focus on boundaries, self care to support wellbeing at work/in personal life. Sister ED recovery, return to school. Improving work stability, financial stability.  Ongoing No contact with mom, managing contact with stepdad. Continuing focus on positive-productive relationships/activities that support wellness, learning to prioritize  personal needs.       Treatment Objective(s) Addressed in This Session:   Use boundaries and proactive communication in relationships.    Improve quantity and quality of night time sleep / decrease daytime naps   Identify negative self-talk and behaviors: challenge core beliefs, myths, and actions  Improve concentration, focus, and mindfulness in daily activities   Feel less fidgety, restless or slow in daily activities / interpersonal interactions    Interventions:  Supportive Therapy: Provided active listening and validation. Surfaced seasonal struggles in household, ways to address  Motivational Interviewing  Target Behavior: continue using   proactive communication skills to get needs met , continue no contact with mom, focusing on healthy and supportive relationships, creating personal time and space.   Stage of Change: ACTION (Actively working towards change)     MI Intervention: Expressed Empathy/Understanding, Supported Autonomy, Collaboration, Evocation, Open-ended questions, Reflections: simple and complex, and Change talk (evoked)     Change Talk Expressed by the Patient: Ability to change Reasons to change Need to change Committment to change Activation Taking steps    Provider Response to Change Talk: E - Evoked more info from patient about behavior change, A - Affirmed patient's thoughts, decisions, or attempts at behavior change, and R - Reflected patient's change talk     Assessments completed prior to visit: 2/15/21 DA  The following assessments were completed by patient for this visit:      PHQ9:       5/22/2023     9:33 AM 6/5/2023     8:29 AM 7/24/2023     8:44 AM 8/10/2023     2:04 PM 8/28/2023     8:17 AM 9/3/2024     7:00 AM 9/23/2024     2:52 PM   PHQ-9 SCORE   PHQ-9 Total Score MyChart 7 (Mild depression) 6 (Mild depression) 11 (Moderate depression) 10 (Moderate depression) 8 (Mild depression) 10 (Moderate depression) 5 (Mild depression)   PHQ-9 Total Score 7 6 11 10 8 10 5     GAD7:        11/28/2022     8:14 AM 5/22/2023     9:34 AM 6/5/2023     8:30 AM 7/24/2023     8:45 AM 8/28/2023     8:19 AM 9/23/2024     2:53 PM 10/7/2024     4:50 PM   AYDIN-7 SCORE   Total Score 11 (moderate anxiety) 10 (moderate anxiety) 8 (mild anxiety) 13 (moderate anxiety) 15 (severe anxiety) 7 (mild anxiety) 7 (mild anxiety)   Total Score 11 10 8 13 15 7 7     PROMIS 10-Global Health (only subscores and total score):       10/3/2022     7:49 AM 10/25/2022     3:59 AM 2/6/2023     9:41 AM 2/20/2023     9:13 AM 5/22/2023     9:35 AM 6/5/2023     8:31 AM 9/23/2024     2:54 PM   PROMIS-10 Scores Only   Global Mental Health Score 12 13 13 13 13 13 13   Global Physical Health Score 18 17 18 17 16 17 17   PROMIS TOTAL - SUBSCORES 30 30 31 30 29 30 30         ASSESSMENT: Current Emotional / Mental Status (status of significant symptoms):   Risk status (Self / Other harm or suicidal ideation)   Patient denies current fears or concerns for personal safety.   Patient denies current or recent suicidal ideation or behaviors.   Patient denies current or recent homicidal ideation or behaviors.   Patient denies current or recent self injurious behavior or ideation.   Patient denies other safety concerns.   Patient reports there has been no change in risk factors since their last session.     Patient reports there has been no change in protective factors since their last session.     Recommended that patient call 911 or go to the local ED should there be a change in any of these risk factors.     Appearance:   Appropriate    Eye Contact:   Good    Psychomotor Behavior: Normal    Attitude:   Cooperative  Friendly   Orientation:   All   Speech    Rate / Production: Normal     Volume:  Normal    Mood:    Anxious  Normal,   Affect:    Appropriate    Thought Content:  Clear    Thought Form:  Coherent  Goal Directed  Logical    Insight:    Good      Medication Review:   No changes to current psychiatric medication(s)     Medication  Compliance:   Yes     Changes in Health Issues:   None noted       Chemical Use Review:   Substance Use: Chemical use reviewed, no active concerns identified      Tobacco Use: No current tobacco use.      Diagnosis:  1. Major depressive disorder, recurrent episode, mild with anxious distress (H)      Collateral Reports Completed:   Not Applicable    PLAN: (Patient Tasks / Therapist Tasks / Other)  Continue using PATRIZIA, FAST and Check the Facts engage intentionally with supportive people, prioritize family/friend/me  time. Maintain no communication with mother, appropriate boundary with stepdad, other individuals who require it.  Continue exercise before work, time for socializing and downtime/balance of responsibility.    DEE Walden LICSW 1/6/2025        _____________________________________________________________  Individual Treatment Plan    Patient's Name: Joey Spears  YOB: 1995    Date of Creation:   9/13/2021  Date Treatment Plan Last Reviewed/Revised: 1/6/25    There has been demonstrated improvement in functioning while patient has been engaged in psychotherapy/psychological service- if withdrawn the patient would deteriorate and/or relapse.     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    Anticipated number of session for this episode of care:   Anticipation frequency of session: Every other week  Anticipated Duration of each session: 38-52 minutes  Treatment plan will be reviewed in 90 days or when goals have been changed.     MeasurableTreatment Goal(s) related to diagnosis / functional impairment(s)  Goal 1: Client will  experience an improvement in mood and anxiety evidenced by self report and AYDIN/PHQ scores of 5 or less    I will know I've met my goal when I'm enjoying my life more (paraphrase) .      Objective #A (Client Action)    Client will Increase interest, engagement, and pleasure in doing things  Decrease frequency and intensity of feeling down, depressed, hopeless  Improve quantity and quality of night time sleep / decrease daytime naps  Feel less tired and more energy during the day   Identify negative self-talk and behaviors: challenge core beliefs, myths, and actions  Improve concentration, focus, and mindfulness in daily activities   Feel less fidgety, restless or slow in daily activities / interpersonal interactions  Use boundaries and proactive communication in relationships.  Status:    1/6/2025    Intervention(s)  Therapist will teach behavioral activation, sleep hygiene, CBT, DBT and ACT skills, proactive communication, mindfulness, grounding skills to support mood improvement and anxiety reduction.    Patient has reviewed and agreed to the above plan.      DEE Walden,  LICSW 1/6/2025

## 2025-01-20 ENCOUNTER — VIRTUAL VISIT (OUTPATIENT)
Dept: PSYCHOLOGY | Facility: CLINIC | Age: 30
End: 2025-01-20
Payer: COMMERCIAL

## 2025-01-20 DIAGNOSIS — F33.0 MAJOR DEPRESSIVE DISORDER, RECURRENT EPISODE, MILD WITH ANXIOUS DISTRESS: Primary | ICD-10-CM

## 2025-01-20 PROCEDURE — 90837 PSYTX W PT 60 MINUTES: CPT | Mod: 95

## 2025-01-20 NOTE — PROGRESS NOTES
[facility]                                     Progress Note    Patient Name: Joey Spears  Date:   1/20/2025        Service Type: Individual      Session Start Time: 3:02 pm  Session End Time: 3:55 pm     Session Length: 53 min    Session #: 62    Attendees: Client attended alone    Service Modality:    Telemedicine Visit: The patient's condition can be safely assessed and treated via synchronous audio and visual telemedicine encounter.      Reason for Telemedicine Visit: Patient has requested telehealth visit    Originating Site (Patient Location): Patient's home    Distant Site (Provider Location): Provider Remote Setting- Home Office    Consent:  The patient/guardian has verbally consented to: the potential risks and benefits of telemedicine (video visit) versus in person care; bill my insurance or make self-payment for services provided; and responsibility for payment of non-covered services.     Mode of Communication:  Video Conference via [employer]    As the provider I attest to compliance with applicable laws and regulations related to telemedicine.    DATA  Interactive Complexity: No  Crisis: No      Progress Since Last Session (Related to Symptoms / Goals / Homework):  Symptoms:  Improving reducing situational stressors    Homework: Achieved / completed to satisfaction      Episode of Care Goals: Satisfactory progress - ACTION (Actively working towards change); Intervened by reinforcing change plan / affirming steps taken     Current / Ongoing Stressors and Concerns:  Partner mental health concerns. Got engaged, some talk on planning. Focus on boundaries, self care to support wellbeing at work/in personal life. Sister ED recovery, return to school. Improving work stability, financial stability.  Ongoing No contact with mom, managing contact with stepdad. Continuing focus on positive-productive relationships/activities that support wellness, learning to prioritize  personal needs.       Treatment Objective(s) Addressed in This Session:   Use boundaries and proactive communication in relationships.    Improve quantity and quality of night time sleep / decrease daytime naps   Identify negative self-talk and behaviors: challenge core beliefs, myths, and actions  Improve concentration, focus, and mindfulness in daily activities   Feel less fidgety, restless or slow in daily activities / interpersonal interactions    Interventions:  Supportive Therapy: Provided active listening and validation. Surfaced seasonal struggles in household, ways to address  Motivational Interviewing  Target Behavior: continue using   proactive communication skills to get needs met , continue no contact with mom, focusing on healthy and supportive relationships, creating personal time and space.   Stage of Change: ACTION (Actively working towards change)     MI Intervention: Expressed Empathy/Understanding, Supported Autonomy, Collaboration, Evocation, Open-ended questions, Reflections: simple and complex, and Change talk (evoked)     Change Talk Expressed by the Patient: Ability to change Reasons to change Need to change Committment to change Activation Taking steps    Provider Response to Change Talk: E - Evoked more info from patient about behavior change, A - Affirmed patient's thoughts, decisions, or attempts at behavior change, and R - Reflected patient's change talk     Assessments completed prior to visit: 2/15/21 DA  The following assessments were completed by patient for this visit:      PHQ9:       6/5/2023     8:29 AM 7/24/2023     8:44 AM 8/10/2023     2:04 PM 8/28/2023     8:17 AM 9/3/2024     7:00 AM 9/23/2024     2:52 PM 1/20/2025     2:50 PM   PHQ-9 SCORE   PHQ-9 Total Score MyChart 6 (Mild depression) 11 (Moderate depression) 10 (Moderate depression) 8 (Mild depression) 10 (Moderate depression) 5 (Mild depression) 9 (Mild depression)   PHQ-9 Total Score 6 11 10 8 10 5 9         Patient-reported     GAD7:       11/28/2022     8:14 AM 5/22/2023     9:34 AM 6/5/2023     8:30 AM 7/24/2023     8:45 AM 8/28/2023     8:19 AM 9/23/2024     2:53 PM 10/7/2024     4:50 PM   AYDIN-7 SCORE   Total Score 11 (moderate anxiety) 10 (moderate anxiety) 8 (mild anxiety) 13 (moderate anxiety) 15 (severe anxiety) 7 (mild anxiety) 7 (mild anxiety)   Total Score 11 10 8 13 15 7 7     PROMIS 10-Global Health (only subscores and total score):       10/25/2022     3:59 AM 2/6/2023     9:41 AM 2/20/2023     9:13 AM 5/22/2023     9:35 AM 6/5/2023     8:31 AM 9/23/2024     2:54 PM 1/20/2025     2:51 PM   PROMIS-10 Scores Only   Global Mental Health Score 13 13 13 13 13 13 12    Global Physical Health Score 17 18 17 16 17 17 17    PROMIS TOTAL - SUBSCORES 30 31 30 29 30 30 29        Patient-reported         ASSESSMENT: Current Emotional / Mental Status (status of significant symptoms):   Risk status (Self / Other harm or suicidal ideation)   Patient denies current fears or concerns for personal safety.   Patient denies current or recent suicidal ideation or behaviors.   Patient denies current or recent homicidal ideation or behaviors.   Patient denies current or recent self injurious behavior or ideation.   Patient denies other safety concerns.   Patient reports there has been no change in risk factors since their last session.     Patient reports there has been no change in protective factors since their last session.     Recommended that patient call 911 or go to the local ED should there be a change in any of these risk factors.     Appearance:   Appropriate    Eye Contact:   Good    Psychomotor Behavior: Normal    Attitude:   Cooperative  Friendly   Orientation:   All   Speech    Rate / Production: Normal     Volume:  Normal    Mood:    Anxious  Normal,   Affect:    Appropriate    Thought Content:  Clear    Thought Form:  Coherent  Goal Directed  Logical    Insight:    Good      Medication Review:   No changes to  current psychiatric medication(s)     Medication Compliance:   Yes     Changes in Health Issues:   None noted       Chemical Use Review:   Substance Use: Chemical use reviewed, no active concerns identified      Tobacco Use: No current tobacco use.      Diagnosis:  1. Major depressive disorder, recurrent episode, mild with anxious distress      Collateral Reports Completed:   Not Applicable    PLAN: (Patient Tasks / Therapist Tasks / Other)  Continue using PATRIZIA, FAST and Check the Facts engage intentionally with supportive people, prioritize family/friend/me  time. Maintain no communication with mother, appropriate boundary with stepdad, other individuals who require it.  Continue exercise before work, time for socializing and downtime/balance of responsibility.    DEE Walden LICSW 1/20/2025        _____________________________________________________________  Individual Treatment Plan    Patient's Name: Joey Spears  YOB: 1995    Date of Creation:   9/13/2021  Date Treatment Plan Last Reviewed/Revised: 1/6/25    There has been demonstrated improvement in functioning while patient has been engaged in psychotherapy/psychological service- if withdrawn the patient would deteriorate and/or relapse.     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    Anticipated number of session for this episode of care:   Anticipation frequency of session: Every other week  Anticipated Duration of each session: 38-52 minutes  Treatment plan will be reviewed in 90 days or when goals have been changed.     MeasurableTreatment Goal(s) related to diagnosis /  functional impairment(s)  Goal 1: Client will experience an improvement in mood and anxiety evidenced by self report and AYDIN/PHQ scores of 5 or less    I will know I've met my goal when I'm enjoying my life more (paraphrase) .      Objective #A (Client Action)    Client will Increase interest, engagement, and pleasure in doing things  Decrease frequency and intensity of feeling down, depressed, hopeless  Improve quantity and quality of night time sleep / decrease daytime naps  Feel less tired and more energy during the day   Identify negative self-talk and behaviors: challenge core beliefs, myths, and actions  Improve concentration, focus, and mindfulness in daily activities   Feel less fidgety, restless or slow in daily activities / interpersonal interactions  Use boundaries and proactive communication in relationships.  Status:    1/6/2025    Intervention(s)  Therapist will teach behavioral activation, sleep hygiene, CBT, DBT and ACT skills, proactive communication, mindfulness, grounding skills to support mood improvement and anxiety reduction.    Patient has reviewed and agreed to the above plan.      DEE Walden,  LICSW 1/6/2025

## 2025-02-03 ENCOUNTER — VIRTUAL VISIT (OUTPATIENT)
Dept: PSYCHOLOGY | Facility: CLINIC | Age: 30
End: 2025-02-03
Payer: COMMERCIAL

## 2025-02-03 DIAGNOSIS — F33.0 MAJOR DEPRESSIVE DISORDER, RECURRENT EPISODE, MILD WITH ANXIOUS DISTRESS: Primary | ICD-10-CM

## 2025-02-03 PROCEDURE — 90837 PSYTX W PT 60 MINUTES: CPT | Mod: 95

## 2025-02-03 NOTE — PROGRESS NOTES
[facility]                                     Progress Note    Patient Name: Joey Spears  Date:   2/3/2025        Service Type: Individual      Session Start Time: 3:02 pm  Session End Time: 3:55 pm     Session Length: 53 min    Session #: 63    Attendees: Client attended alone    Service Modality:    Telemedicine Visit: The patient's condition can be safely assessed and treated via synchronous audio and visual telemedicine encounter.      Reason for Telemedicine Visit: Patient has requested telehealth visit    Originating Site (Patient Location): Patient's home    Distant Site (Provider Location): Provider Remote Setting- Home Office    Consent:  The patient/guardian has verbally consented to: the potential risks and benefits of telemedicine (video visit) versus in person care; bill my insurance or make self-payment for services provided; and responsibility for payment of non-covered services.     Mode of Communication:  Video Conference via [employer]    As the provider I attest to compliance with applicable laws and regulations related to telemedicine.    DATA  Extended Session (53+ minutes): PROLONGED SERVICE IN THE OUTPATIENT SETTING REQUIRING DIRECT (FACE-TO-FACE) PATIENT CONTACT BEYOND THE USUAL SERVICE:    - Longer session due to limited access to mental health appointments and necessity to address patient's distress / complexity  Interactive Complexity: No  Crisis: No      Progress Since Last Session (Related to Symptoms / Goals / Homework):  Symptoms:  Improving reducing situational stressors    Homework: Achieved / completed to satisfaction      Episode of Care Goals: Satisfactory progress - ACTION (Actively working towards change); Intervened by reinforcing change plan / affirming steps taken     Current / Ongoing Stressors and Concerns:  Partner mental health concerns. Working on communicating needs/expectations. Focus on boundaries, self care to support wellbeing  at work/in personal life. Sister ED recovery.  Ongoing No contact with mom, managing contact with stepdad. Continuing focus on positive-productive relationships/activities that support wellness, learning to prioritize personal needs.       Treatment Objective(s) Addressed in This Session:   Use boundaries and proactive communication in relationships.    Improve quantity and quality of night time sleep / decrease daytime naps   Identify negative self-talk and behaviors: challenge core beliefs, myths, and actions  Improve concentration, focus, and mindfulness in daily activities   Feel less fidgety, restless or slow in daily activities / interpersonal interactions    Interventions:  Supportive Therapy: Provided active listening and validation. Reinforced efforts to engage in household challenges.  Motivational Interviewing  Target Behavior: continue using   proactive communication skills to get needs met , continue no contact with mom, focusing on healthy and supportive relationships, creating personal time and space.   Stage of Change: ACTION (Actively working towards change)     MI Intervention: Expressed Empathy/Understanding, Supported Autonomy, Collaboration, Evocation, Open-ended questions, Reflections: simple and complex, and Change talk (evoked)     Change Talk Expressed by the Patient: Ability to change Reasons to change Need to change Committment to change Activation Taking steps    Provider Response to Change Talk: E - Evoked more info from patient about behavior change, A - Affirmed patient's thoughts, decisions, or attempts at behavior change, and R - Reflected patient's change talk     Assessments completed prior to visit: 2/15/21 DA  The following assessments were completed by patient for this visit:      PHQ9:       7/24/2023     8:44 AM 8/10/2023     2:04 PM 8/28/2023     8:17 AM 9/3/2024     7:00 AM 9/23/2024     2:52 PM 1/20/2025     2:50 PM 2/3/2025     2:48 PM   PHQ-9 SCORE   PHQ-9 Total Score  MyChart 11 (Moderate depression) 10 (Moderate depression) 8 (Mild depression) 10 (Moderate depression) 5 (Mild depression) 9 (Mild depression) 8 (Mild depression)   PHQ-9 Total Score 11 10 8 10 5 9  8        Patient-reported     GAD7:       11/28/2022     8:14 AM 5/22/2023     9:34 AM 6/5/2023     8:30 AM 7/24/2023     8:45 AM 8/28/2023     8:19 AM 9/23/2024     2:53 PM 10/7/2024     4:50 PM   AYDIN-7 SCORE   Total Score 11 (moderate anxiety) 10 (moderate anxiety) 8 (mild anxiety) 13 (moderate anxiety) 15 (severe anxiety) 7 (mild anxiety) 7 (mild anxiety)   Total Score 11 10 8 13 15 7 7     PROMIS 10-Global Health (only subscores and total score):       2/6/2023     9:41 AM 2/20/2023     9:13 AM 5/22/2023     9:35 AM 6/5/2023     8:31 AM 9/23/2024     2:54 PM 1/20/2025     2:51 PM 2/3/2025     2:49 PM   PROMIS-10 Scores Only   Global Mental Health Score 13 13 13 13 13 12  12    Global Physical Health Score 18 17 16 17 17 17  17    PROMIS TOTAL - SUBSCORES 31 30 29 30 30 29  29        Patient-reported         ASSESSMENT: Current Emotional / Mental Status (status of significant symptoms):   Risk status (Self / Other harm or suicidal ideation)   Patient denies current fears or concerns for personal safety.   Patient denies current or recent suicidal ideation or behaviors.   Patient denies current or recent homicidal ideation or behaviors.   Patient denies current or recent self injurious behavior or ideation.   Patient denies other safety concerns.   Patient reports there has been no change in risk factors since their last session.     Patient reports there has been no change in protective factors since their last session.     Recommended that patient call 911 or go to the local ED should there be a change in any of these risk factors.     Appearance:   Appropriate    Eye Contact:   Good    Psychomotor Behavior: Normal    Attitude:   Cooperative  Friendly   Orientation:   All   Speech    Rate / Production: Normal      Volume:  Normal    Mood:    Anxious  Normal,   Affect:    Appropriate    Thought Content:  Clear    Thought Form:  Coherent  Goal Directed  Logical    Insight:    Good      Medication Review:   No changes to current psychiatric medication(s)     Medication Compliance:   Yes     Changes in Health Issues:   None noted       Chemical Use Review:   Substance Use: Chemical use reviewed, no active concerns identified      Tobacco Use: No current tobacco use.      Diagnosis:  No diagnosis found.    Collateral Reports Completed:   Not Applicable    PLAN: (Patient Tasks / Therapist Tasks / Other)  Continue using PATRIZIA, FAST and Check the Facts engage intentionally with supportive people, prioritize family/friend/me  time. Maintain no communication with mother, appropriate boundary with stepdad, other individuals who require it.  Continue exercise before work, time for socializing and downtime/balance of responsibility.    DEE Walden LICSW 2/3/2025        _____________________________________________________________  Individual Treatment Plan    Patient's Name: Joey Spears  YOB: 1995    Date of Creation:   9/13/2021  Date Treatment Plan Last Reviewed/Revised: 1/6/25    There has been demonstrated improvement in functioning while patient has been engaged in psychotherapy/psychological service- if withdrawn the patient would deteriorate and/or relapse.     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    Anticipated number of session for this episode of care:   Anticipation frequency of session: Every other week  Anticipated Duration of each  session: 38-52 minutes  Treatment plan will be reviewed in 90 days or when goals have been changed.     MeasurableTreatment Goal(s) related to diagnosis / functional impairment(s)  Goal 1: Client will experience an improvement in mood and anxiety evidenced by self report and AYDIN/PHQ scores of 5 or less    I will know I've met my goal when I'm enjoying my life more (paraphrase) .      Objective #A (Client Action)    Client will Increase interest, engagement, and pleasure in doing things  Decrease frequency and intensity of feeling down, depressed, hopeless  Improve quantity and quality of night time sleep / decrease daytime naps  Feel less tired and more energy during the day   Identify negative self-talk and behaviors: challenge core beliefs, myths, and actions  Improve concentration, focus, and mindfulness in daily activities   Feel less fidgety, restless or slow in daily activities / interpersonal interactions  Use boundaries and proactive communication in relationships.  Status:    1/6/2025    Intervention(s)  Therapist will teach behavioral activation, sleep hygiene, CBT, DBT and ACT skills, proactive communication, mindfulness, grounding skills to support mood improvement and anxiety reduction.    Patient has reviewed and agreed to the above plan.      DEE Walden,  LICSW 1/6/2025

## 2025-02-17 ENCOUNTER — VIRTUAL VISIT (OUTPATIENT)
Facility: CLINIC | Age: 30
End: 2025-02-17
Payer: COMMERCIAL

## 2025-02-17 DIAGNOSIS — F33.0 MAJOR DEPRESSIVE DISORDER, RECURRENT EPISODE, MILD WITH ANXIOUS DISTRESS: Primary | ICD-10-CM

## 2025-02-17 PROCEDURE — 90837 PSYTX W PT 60 MINUTES: CPT | Mod: 95

## 2025-02-17 NOTE — PROGRESS NOTES
[facility]                                     Progress Note    Patient Name: Joey Spears  Date:   2/17/2025        Service Type: Individual      Session Start Time: 10:00 am  Session End Time: 10:55 am     Session Length: 55 min    Session #: 64    Attendees: Client attended alone    Service Modality:    Telemedicine Visit: The patient's condition can be safely assessed and treated via synchronous audio and visual telemedicine encounter.      Reason for Telemedicine Visit: Patient has requested telehealth visit    Originating Site (Patient Location): Patient's home    Distant Site (Provider Location): Provider Remote Setting- Home Office    Consent:  The patient/guardian has verbally consented to: the potential risks and benefits of telemedicine (video visit) versus in person care; bill my insurance or make self-payment for services provided; and responsibility for payment of non-covered services.     Mode of Communication:  Video Conference via [employer]    As the provider I attest to compliance with applicable laws and regulations related to telemedicine.    DATA  Extended Session (53+ minutes): PROLONGED SERVICE IN THE OUTPATIENT SETTING REQUIRING DIRECT (FACE-TO-FACE) PATIENT CONTACT BEYOND THE USUAL SERVICE:    - Longer session due to limited access to mental health appointments and necessity to address patient's distress / complexity  Interactive Complexity: No  Crisis: No      Progress Since Last Session (Related to Symptoms / Goals / Homework):  Symptoms:  Improving reducing situational stressors    Homework: Achieved / completed to satisfaction      Episode of Care Goals: Satisfactory progress - ACTION (Actively working towards change); Intervened by reinforcing change plan / affirming steps taken     Current / Ongoing Stressors and Concerns:  Partner mental health concerns. Working on communicating needs/expectations. Focus on boundaries, self care to support  wellbeing at work/in personal life. Sister ED recovery struggle.  Ongoing No contact with mom, managing contact with stepdad. Continuing focus on positive-productive relationships/activities that support wellness, learning to prioritize personal needs.       Treatment Objective(s) Addressed in This Session:   Use boundaries and proactive communication in relationships.    Improve quantity and quality of night time sleep / decrease daytime naps   Identify negative self-talk and behaviors: challenge core beliefs, myths, and actions  Improve concentration, focus, and mindfulness in daily activities   Feel less fidgety, restless or slow in daily activities / interpersonal interactions    Interventions:  Supportive Therapy: Provided active listening and validation. Reinforced efforts to engage in household challenges.  Motivational Interviewing  Target Behavior: continue using   proactive communication skills to get needs met , continue no contact with mom, focusing on healthy and supportive relationships, creating personal time and space.   Stage of Change: ACTION (Actively working towards change)     MI Intervention: Expressed Empathy/Understanding, Supported Autonomy, Collaboration, Evocation, Open-ended questions, Reflections: simple and complex, and Change talk (evoked)     Change Talk Expressed by the Patient: Ability to change Reasons to change Need to change Committment to change Activation Taking steps    Provider Response to Change Talk: E - Evoked more info from patient about behavior change, A - Affirmed patient's thoughts, decisions, or attempts at behavior change, and R - Reflected patient's change talk     Assessments completed prior to visit: 2/15/21 DA  The following assessments were completed by patient for this visit:      PHQ9:       7/24/2023     8:44 AM 8/10/2023     2:04 PM 8/28/2023     8:17 AM 9/3/2024     7:00 AM 9/23/2024     2:52 PM 1/20/2025     2:50 PM 2/3/2025     2:48 PM   PHQ-9 SCORE    [facility] 11 (Moderate depression) 10 (Moderate depression) 8 (Mild depression) 10 (Moderate depression) 5 (Mild depression) 9 (Mild depression) 8 (Mild depression)   PHQ-9 Total Score 11 10 8 10 5 9  8        Patient-reported     GAD7:       11/28/2022     8:14 AM 5/22/2023     9:34 AM 6/5/2023     8:30 AM 7/24/2023     8:45 AM 8/28/2023     8:19 AM 9/23/2024     2:53 PM 10/7/2024     4:50 PM   AYDIN-7 SCORE   Total Score 11 (moderate anxiety) 10 (moderate anxiety) 8 (mild anxiety) 13 (moderate anxiety) 15 (severe anxiety) 7 (mild anxiety) 7 (mild anxiety)   Total Score 11 10 8 13 15 7 7     PROMIS 10-Global Health (only subscores and total score):       2/6/2023     9:41 AM 2/20/2023     9:13 AM 5/22/2023     9:35 AM 6/5/2023     8:31 AM 9/23/2024     2:54 PM 1/20/2025     2:51 PM 2/3/2025     2:49 PM   PROMIS-10 Scores Only   Global Mental Health Score 13 13 13 13 13 12  12    Global Physical Health Score 18 17 16 17 17 17  17    PROMIS TOTAL - SUBSCORES 31 30 29 30 30 29  29        Patient-reported         ASSESSMENT: Current Emotional / Mental Status (status of significant symptoms):   Risk status (Self / Other harm or suicidal ideation)   Patient denies current fears or concerns for personal safety.   Patient denies current or recent suicidal ideation or behaviors.   Patient denies current or recent homicidal ideation or behaviors.   Patient denies current or recent self injurious behavior or ideation.   Patient denies other safety concerns.   Patient reports there has been no change in risk factors since their last session.     Patient reports there has been no change in protective factors since their last session.     Recommended that patient call 911 or go to the local ED should there be a change in any of these risk factors.     Appearance:   Appropriate    Eye Contact:   Good    Psychomotor Behavior: Normal    Attitude:   Cooperative  Friendly   Orientation:   All   Speech    Rate /  Production: Normal     Volume:  Normal    Mood:    Anxious  Normal,   Affect:    Appropriate    Thought Content:  Clear    Thought Form:  Coherent  Goal Directed  Logical    Insight:    Good      Medication Review:   No changes to current psychiatric medication(s)     Medication Compliance:   Yes     Changes in Health Issues:   None noted       Chemical Use Review:   Substance Use: Chemical use reviewed, no active concerns identified      Tobacco Use: No current tobacco use.      Diagnosis:  1. Major depressive disorder, recurrent episode, mild with anxious distress        Collateral Reports Completed:   Not Applicable    PLAN: (Patient Tasks / Therapist Tasks / Other)  Continue using PATRIZIA, FAST and Check the Facts engage intentionally with supportive people, prioritize family/friend/me  time. Maintain no communication with mother, appropriate boundary with stepdad, other individuals who require it.  Continue exercise before work, time for socializing and downtime/balance of responsibility, proactive communication to address concerns.    DEE Walden LICSW 2/17/2025        _____________________________________________________________  Individual Treatment Plan    Patient's Name: Joey Spears  YOB: 1995    Date of Creation:   9/13/2021  Date Treatment Plan Last Reviewed/Revised: 1/6/25    There has been demonstrated improvement in functioning while patient has been engaged in psychotherapy/psychological service- if withdrawn the patient would deteriorate and/or relapse.     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    Anticipated  number of session for this episode of care:   Anticipation frequency of session: Every other week  Anticipated Duration of each session: 38-52 minutes  Treatment plan will be reviewed in 90 days or when goals have been changed.     MeasurableTreatment Goal(s) related to diagnosis / functional impairment(s)  Goal 1: Client will experience an improvement in mood and anxiety evidenced by self report and AYDIN/PHQ scores of 5 or less    I will know I've met my goal when I'm enjoying my life more (paraphrase) .      Objective #A (Client Action)    Client will Increase interest, engagement, and pleasure in doing things  Decrease frequency and intensity of feeling down, depressed, hopeless  Improve quantity and quality of night time sleep / decrease daytime naps  Feel less tired and more energy during the day   Identify negative self-talk and behaviors: challenge core beliefs, myths, and actions  Improve concentration, focus, and mindfulness in daily activities   Feel less fidgety, restless or slow in daily activities / interpersonal interactions  Use boundaries and proactive communication in relationships.  Status:    1/6/2025    Intervention(s)  Therapist will teach behavioral activation, sleep hygiene, CBT, DBT and ACT skills, proactive communication, mindfulness, grounding skills to support mood improvement and anxiety reduction.    Patient has reviewed and agreed to the above plan.      DEE Walden,  LICSW 1/6/2025

## 2025-03-03 ENCOUNTER — VIRTUAL VISIT (OUTPATIENT)
Facility: CLINIC | Age: 30
End: 2025-03-03
Payer: COMMERCIAL

## 2025-03-03 DIAGNOSIS — F33.0 MAJOR DEPRESSIVE DISORDER, RECURRENT EPISODE, MILD WITH ANXIOUS DISTRESS: Primary | ICD-10-CM

## 2025-03-03 PROCEDURE — 90837 PSYTX W PT 60 MINUTES: CPT | Mod: 95

## 2025-03-03 NOTE — PROGRESS NOTES
[facility]                                     Progress Note    Patient Name: Joey Spears  Date:   3/3/2025        Service Type: Individual      Session Start Time: 10:01 am  Session End Time: 10:55 am     Session Length: 54 min    Session #: 66    Attendees: Client attended alone    Service Modality:    Telemedicine Visit: The patient's condition can be safely assessed and treated via synchronous audio and visual telemedicine encounter.      Reason for Telemedicine Visit: Patient has requested telehealth visit    Originating Site (Patient Location): Patient's home    Distant Site (Provider Location): Provider Remote Setting- Home Office    Consent:  The patient/guardian has verbally consented to: the potential risks and benefits of telemedicine (video visit) versus in person care; bill my insurance or make self-payment for services provided; and responsibility for payment of non-covered services.     Mode of Communication:  Video Conference via [employer]    As the provider I attest to compliance with applicable laws and regulations related to telemedicine.    DATA  Extended Session (53+ minutes): PROLONGED SERVICE IN THE OUTPATIENT SETTING REQUIRING DIRECT (FACE-TO-FACE) PATIENT CONTACT BEYOND THE USUAL SERVICE:    - Patient's presenting concerns require more intensive intervention than could be completed within the usual service  Interactive Complexity: No  Crisis: No      Progress Since Last Session (Related to Symptoms / Goals / Homework):  Symptoms:  Improving reducing situational stressors    Homework: Achieved / completed to satisfaction      Episode of Care Goals: Satisfactory progress - ACTION (Actively working towards change); Intervened by reinforcing change plan / affirming steps taken     Current / Ongoing Stressors and Concerns:  Sister ED recovery relapse, upcoming intake appt. Distressing news/ contact from estranged mom. Partner mental health concerns. Working  on communicating needs/expectations. Focus on boundaries, self care to support wellbeing at work/in personal life. Ongoing No contact with mom, managing contact with stepdad. Continuing focus on positive-productive relationships/activities that support wellness, learning to prioritize personal needs.       Treatment Objective(s) Addressed in This Session:   Use boundaries and proactive communication in relationships.    Improve quantity and quality of night time sleep / decrease daytime naps   Identify negative self-talk and behaviors: challenge core beliefs, myths, and actions  Improve concentration, focus, and mindfulness in daily activities   Feel less fidgety, restless or slow in daily activities / interpersonal interactions    Interventions:  Supportive Therapy: Provided active listening and validation. Engaged in emotion identification-coping skills practice to support current distress levels Locus of control  Motivational Interviewing  Target Behavior: continue using   proactive communication skills to get needs met , continue no contact with mom, focusing on healthy and supportive relationships, creating personal time and space.   Stage of Change: ACTION (Actively working towards change)     MI Intervention: Expressed Empathy/Understanding, Supported Autonomy, Collaboration, Evocation, Open-ended questions, Reflections: simple and complex, and Change talk (evoked)     Change Talk Expressed by the Patient: Ability to change Reasons to change Need to change Committment to change Activation Taking steps    Provider Response to Change Talk: E - Evoked more info from patient about behavior change, A - Affirmed patient's thoughts, decisions, or attempts at behavior change, and R - Reflected patient's change talk     Assessments completed prior to visit: 2/15/21 DA  The following assessments were completed by patient for this visit:      PHQ9:       7/24/2023     8:44 AM 8/10/2023     2:04 PM 8/28/2023     8:17 AM  9/3/2024     7:00 AM 9/23/2024     2:52 PM 1/20/2025     2:50 PM 2/3/2025     2:48 PM   PHQ-9 SCORE   PHQ-9 Total Score MyChart 11 (Moderate depression) 10 (Moderate depression) 8 (Mild depression) 10 (Moderate depression) 5 (Mild depression) 9 (Mild depression) 8 (Mild depression)   PHQ-9 Total Score 11 10 8 10 5 9  8        Patient-reported     GAD7:       11/28/2022     8:14 AM 5/22/2023     9:34 AM 6/5/2023     8:30 AM 7/24/2023     8:45 AM 8/28/2023     8:19 AM 9/23/2024     2:53 PM 10/7/2024     4:50 PM   AYDIN-7 SCORE   Total Score 11 (moderate anxiety) 10 (moderate anxiety) 8 (mild anxiety) 13 (moderate anxiety) 15 (severe anxiety) 7 (mild anxiety) 7 (mild anxiety)   Total Score 11 10 8 13 15 7 7     PROMIS 10-Global Health (only subscores and total score):       2/6/2023     9:41 AM 2/20/2023     9:13 AM 5/22/2023     9:35 AM 6/5/2023     8:31 AM 9/23/2024     2:54 PM 1/20/2025     2:51 PM 2/3/2025     2:49 PM   PROMIS-10 Scores Only   Global Mental Health Score 13 13 13 13 13 12  12    Global Physical Health Score 18 17 16 17 17 17  17    PROMIS TOTAL - SUBSCORES 31 30 29 30 30 29  29        Patient-reported         ASSESSMENT: Current Emotional / Mental Status (status of significant symptoms):   Risk status (Self / Other harm or suicidal ideation)   Patient denies current fears or concerns for personal safety.   Patient denies current or recent suicidal ideation or behaviors.   Patient denies current or recent homicidal ideation or behaviors.   Patient denies current or recent self injurious behavior or ideation.   Patient denies other safety concerns.   Patient reports there has been no change in risk factors since their last session.     Patient reports there has been no change in protective factors since their last session.     Recommended that patient call 911 or go to the local ED should there be a change in any of these risk factors     Appearance:   Appropriate    Eye Contact:   Good    Psychomotor  Behavior: Normal    Attitude:   Cooperative  Friendly   Orientation:   All   Speech    Rate / Production: Normal     Volume:  Normal    Mood:    Anxious  Depressed ,   Affect:    Appropriate    Thought Content:  Clear    Thought Form:  Circumstantial   Insight:    Good      Medication Review:   No changes to current psychiatric medication(s)     Medication Compliance:   Yes     Changes in Health Issues:   None noted       Chemical Use Review:   Substance Use: Chemical use reviewed, no active concerns identified      Tobacco Use: No current tobacco use.      Diagnosis:  1. Major depressive disorder, recurrent episode, mild with anxious distress        Collateral Reports Completed:   Not Applicable    PLAN: (Patient Tasks / Therapist Tasks / Other)  Continue using PATRIZIA, FAST and Check the Facts engage intentionally with supportive people, prioritize family/friend/me  time. Maintain no communication with mother, appropriate boundary with stepdad, other individuals who require it.  Continue exercise before work, time for socializing and downtime/balance of responsibility, proactive communication to address concerns.    DEE Walden [facility] Hospital[facility] 3/3/2025        _____________________________________________________________  Individual Treatment Plan    Patient's Name: Joey Spears  YOB: 1995    Date of Creation:   9/13/2021  Date Treatment Plan Last Reviewed/Revised: 1/6/25    There has been demonstrated improvement in functioning while patient has been engaged in psychotherapy/psychological service- if withdrawn the patient would deteriorate and/or relapse.     DSM5 Diagnoses: 296.21 (F32.0) Major Depressive Disorder, Single Episode, Mild With anxious distress  Psychosocial / Contextual Factors: strained mother relationship; covid fatigue; public facing role with substantial emotional burden  PROMIS (reviewed every 90 days): 30 10/25/22     30 9/23/24    Referral / Collaboration:  Referral to  another professional/service is not indicated at this time.    Anticipated number of session for this episode of care:   Anticipation frequency of session: Every other week  Anticipated Duration of each session: 38-52 minutes  Treatment plan will be reviewed in 90 days or when goals have been changed.     MeasurableTreatment Goal(s) related to diagnosis / functional impairment(s)  Goal 1: Client will experience an improvement in mood and anxiety evidenced by self report and AYDIN/PHQ scores of 5 or less    I will know I've met my goal when I'm enjoying my life more (paraphrase) .      Objective #A (Client Action)    Client will Increase interest, engagement, and pleasure in doing things  Decrease frequency and intensity of feeling down, depressed, hopeless  Improve quantity and quality of night time sleep / decrease daytime naps  Feel less tired and more energy during the day   Identify negative self-talk and behaviors: challenge core beliefs, myths, and actions  Improve concentration, focus, and mindfulness in daily activities   Feel less fidgety, restless or slow in daily activities / interpersonal interactions  Use boundaries and proactive communication in relationships.  Status:    1/6/2025    Intervention(s)  Therapist will teach behavioral activation, sleep hygiene, CBT, DBT and ACT skills, proactive communication, mindfulness, grounding skills to support mood improvement and anxiety reduction.    Patient has reviewed and agreed to the above plan.      DEE Walden,  LICSW 1/6/2025

## 2025-03-17 ENCOUNTER — VIRTUAL VISIT (OUTPATIENT)
Facility: CLINIC | Age: 30
End: 2025-03-17
Payer: COMMERCIAL

## 2025-03-17 DIAGNOSIS — F33.0 MAJOR DEPRESSIVE DISORDER, RECURRENT EPISODE, MILD WITH ANXIOUS DISTRESS: Primary | ICD-10-CM

## 2025-03-17 PROCEDURE — 90837 PSYTX W PT 60 MINUTES: CPT | Mod: 95

## 2025-03-17 NOTE — PROGRESS NOTES
[facility]                                     Progress Note    Patient Name: Joey Spears  Date:   3/17/2025        Service Type: Individual      Session Start Time: 10:01 am  Session End Time: 10:55 am     Session Length: 54 min    Session #: 67    Attendees: Client attended alone    Service Modality:    Telemedicine Visit: The patient's condition can be safely assessed and treated via synchronous audio and visual telemedicine encounter.      Reason for Telemedicine Visit: Patient has requested telehealth visit    Originating Site (Patient Location): Patient's home    Distant Site (Provider Location): Provider Remote Setting- Home Office    Consent:  The patient/guardian has verbally consented to: the potential risks and benefits of telemedicine (video visit) versus in person care; bill my insurance or make self-payment for services provided; and responsibility for payment of non-covered services.     Mode of Communication:  Video Conference via [employer]    As the provider I attest to compliance with applicable laws and regulations related to telemedicine.    DATA  Extended Session (53+ minutes): PROLONGED SERVICE IN THE OUTPATIENT SETTING REQUIRING DIRECT (FACE-TO-FACE) PATIENT CONTACT BEYOND THE USUAL SERVICE:    - Patient's presenting concerns require more intensive intervention than could be completed within the usual service  Interactive Complexity: No  Crisis: No      Progress Since Last Session (Related to Symptoms / Goals / Homework):  Symptoms:  Improving reducing situational stressors    Homework: Achieved / completed to satisfaction      Episode of Care Goals: Satisfactory progress - ACTION (Actively working towards change); Intervened by reinforcing change plan / affirming steps taken     Current / Ongoing Stressors and Concerns:  Sister ED recovery relapse. Distressing news/ contact from estranged mom. Partner mental health concerns. Working on communicating  needs/expectations. Focus on boundaries, self care to support wellbeing at work/in personal life. Ongoing No contact with mom, managing contact with stepdad. Continuing focus on positive-productive relationships/activities that support wellness, learning to prioritize personal needs.       Treatment Objective(s) Addressed in This Session:   Use boundaries and proactive communication in relationships.    Improve quantity and quality of night time sleep / decrease daytime naps   Identify negative self-talk and behaviors: challenge core beliefs, myths, and actions  Improve concentration, focus, and mindfulness in daily activities   Feel less fidgety, restless or slow in daily activities / interpersonal interactions    Interventions:  Supportive Therapy: Provided active listening and validation. Surfaced feelings about space, triggers/past dynamics related to home life with mom, acknowledged difference in dynamic with fiance.   Motivational Interviewing  Target Behavior: continue using   proactive communication skills to get needs met , continue no contact with mom, focusing on healthy and supportive relationships, creating personal time and space.   Stage of Change: ACTION (Actively working towards change)     MI Intervention: Expressed Empathy/Understanding, Supported Autonomy, Collaboration, Evocation, Open-ended questions, Reflections: simple and complex, and Change talk (evoked)     Change Talk Expressed by the Patient: Ability to change Reasons to change Need to change Committment to change Activation Taking steps    Provider Response to Change Talk: E - Evoked more info from patient about behavior change, A - Affirmed patient's thoughts, decisions, or attempts at behavior change, and R - Reflected patient's change talk     Assessments completed prior to visit: 2/15/21 DA  The following assessments were completed by patient for this visit:      PHQ9:       7/24/2023     8:44 AM 8/10/2023     2:04 PM 8/28/2023      8:17 AM 9/3/2024     7:00 AM 9/23/2024     2:52 PM 1/20/2025     2:50 PM 2/3/2025     2:48 PM   PHQ-9 SCORE   PHQ-9 Total Score MyChart 11 (Moderate depression) 10 (Moderate depression) 8 (Mild depression) 10 (Moderate depression) 5 (Mild depression) 9 (Mild depression) 8 (Mild depression)   PHQ-9 Total Score 11 10 8 10 5 9  8        Patient-reported     GAD7:       11/28/2022     8:14 AM 5/22/2023     9:34 AM 6/5/2023     8:30 AM 7/24/2023     8:45 AM 8/28/2023     8:19 AM 9/23/2024     2:53 PM 10/7/2024     4:50 PM   AYDIN-7 SCORE   Total Score 11 (moderate anxiety) 10 (moderate anxiety) 8 (mild anxiety) 13 (moderate anxiety) 15 (severe anxiety) 7 (mild anxiety) 7 (mild anxiety)   Total Score 11 10 8 13 15 7 7     PROMIS 10-Global Health (only subscores and total score):       2/6/2023     9:41 AM 2/20/2023     9:13 AM 5/22/2023     9:35 AM 6/5/2023     8:31 AM 9/23/2024     2:54 PM 1/20/2025     2:51 PM 2/3/2025     2:49 PM   PROMIS-10 Scores Only   Global Mental Health Score 13 13 13 13 13 12  12    Global Physical Health Score 18 17 16 17 17 17  17    PROMIS TOTAL - SUBSCORES 31 30 29 30 30 29  29        Patient-reported      ASSESSMENT: Current Emotional / Mental Status (status of significant symptoms):   Risk status (Self / Other harm or suicidal ideation)   Patient denies current fears or concerns for personal safety.   Patient denies current or recent suicidal ideation or behaviors.   Patient denies current or recent homicidal ideation or behaviors.   Patient denies current or recent self injurious behavior or ideation.   Patient denies other safety concerns.   Patient reports there has been no change in risk factors since their last session.     Patient reports there has been no change in protective factors since their last session.     Recommended that patient call 911 or go to the local ED should there be a change in any of these risk factors     Appearance:   Appropriate    Eye Contact:   Good     Psychomotor Behavior: Normal    Attitude:   Cooperative  Friendly   Orientation:   All   Speech    Rate / Production: Normal     Volume:  Normal    Mood:    Anxious  Depressed ,   Affect:    Appropriate    Thought Content:  Clear    Thought Form:  Circumstantial   Insight:    Good      Medication Review:   No changes to current psychiatric medication(s)     Medication Compliance:   Yes     Changes in Health Issues:   None noted     Chemical Use Review:   Substance Use: Chemical use reviewed, no active concerns identified      Tobacco Use: No current tobacco use.      Diagnosis:  1. Major depressive disorder, recurrent episode, mild with anxious distress        Collateral Reports Completed:   Not Applicable    PLAN: (Patient Tasks / Therapist Tasks / Other)  Continue using PATRIZIA, FAST and Check the Facts engage intentionally with supportive people, prioritize family/friend/me  time. Maintain no communication with mother, appropriate boundary with stepdad, other individuals who require it.  Continue exercise before work, time for socializing and downtime/balance of responsibility, proactive communication to address concerns.    DEE Walden LICSW 3/17/2025        _____________________________________________________________  Individual Treatment Plan    Patient's Name: Joey Spears  YOB: 1995    Date of Creation:   9/13/2021  Date Treatment Plan Last Reviewed/Revised: 1/6/25    There has been demonstrated improvement in functioning while patient has been engaged in psychotherapy/psychological service- if withdrawn the patient would deteriorate and/or relapse.     DSM5 Diagnoses: 296.21 (F32.0) Major Depressive Disorder, Single Episode, Mild With anxious distress  Psychosocial / Contextual Factors: strained mother relationship; covid fatigue; public facing role with substantial emotional burden  PROMIS (reviewed every 90 days): 30 10/25/22     30 9/23/24    Referral /  Collaboration:  Referral to another professional/service is not indicated at this time.    Anticipated number of session for this episode of care:   Anticipation frequency of session: Every other week  Anticipated Duration of each session: 38-52 minutes  Treatment plan will be reviewed in 90 days or when goals have been changed.     MeasurableTreatment Goal(s) related to diagnosis / functional impairment(s)  Goal 1: Client will experience an improvement in mood and anxiety evidenced by self report and AYDIN/PHQ scores of 5 or less    I will know I've met my goal when I'm enjoying my life more (paraphrase) .      Objective #A (Client Action)    Client will Increase interest, engagement, and pleasure in doing things  Decrease frequency and intensity of feeling down, depressed, hopeless  Improve quantity and quality of night time sleep / decrease daytime naps  Feel less tired and more energy during the day   Identify negative self-talk and behaviors: challenge core beliefs, myths, and actions  Improve concentration, focus, and mindfulness in daily activities   Feel less fidgety, restless or slow in daily activities / interpersonal interactions  Use boundaries and proactive communication in relationships.  Status:    1/6/2025    Intervention(s)  Therapist will teach behavioral activation, sleep hygiene, CBT, DBT and ACT skills, proactive communication, mindfulness, grounding skills to support mood improvement and anxiety reduction.    Patient has reviewed and agreed to the above plan.      DEE Walden,  LICSW 1/6/2025

## 2025-04-07 ENCOUNTER — VIRTUAL VISIT (OUTPATIENT)
Dept: FAMILY MEDICINE | Facility: CLINIC | Age: 30
End: 2025-04-07
Attending: FAMILY MEDICINE
Payer: COMMERCIAL

## 2025-04-07 ENCOUNTER — VIRTUAL VISIT (OUTPATIENT)
Facility: CLINIC | Age: 30
End: 2025-04-07
Payer: COMMERCIAL

## 2025-04-07 DIAGNOSIS — Z00.00 ROUTINE GENERAL MEDICAL EXAMINATION AT A HEALTH CARE FACILITY: ICD-10-CM

## 2025-04-07 DIAGNOSIS — F33.0 MAJOR DEPRESSIVE DISORDER, RECURRENT EPISODE, MILD WITH ANXIOUS DISTRESS: Primary | ICD-10-CM

## 2025-04-07 DIAGNOSIS — F41.9 ANXIETY: ICD-10-CM

## 2025-04-07 DIAGNOSIS — F33.1 MODERATE EPISODE OF RECURRENT MAJOR DEPRESSIVE DISORDER (H): ICD-10-CM

## 2025-04-07 DIAGNOSIS — F41.1 ANXIETY STATE: ICD-10-CM

## 2025-04-07 PROCEDURE — 96127 BRIEF EMOTIONAL/BEHAV ASSMT: CPT | Mod: 95 | Performed by: FAMILY MEDICINE

## 2025-04-07 PROCEDURE — 98006 SYNCH AUDIO-VIDEO EST MOD 30: CPT | Performed by: FAMILY MEDICINE

## 2025-04-07 PROCEDURE — 1126F AMNT PAIN NOTED NONE PRSNT: CPT | Performed by: FAMILY MEDICINE

## 2025-04-07 PROCEDURE — 90837 PSYTX W PT 60 MINUTES: CPT | Mod: 95

## 2025-04-07 RX ORDER — LEVONORGESTREL AND ETHINYL ESTRADIOL 0.15-0.03
1 KIT ORAL DAILY
Qty: 91 TABLET | Refills: 4 | Status: SHIPPED | OUTPATIENT
Start: 2025-04-07

## 2025-04-07 RX ORDER — BUSPIRONE HYDROCHLORIDE 10 MG/1
10 TABLET ORAL 2 TIMES DAILY
Qty: 180 TABLET | Refills: 3 | Status: CANCELLED | OUTPATIENT
Start: 2025-04-07

## 2025-04-07 RX ORDER — GABAPENTIN 300 MG/1
300 CAPSULE ORAL 2 TIMES DAILY
Qty: 180 CAPSULE | Refills: 1 | Status: SHIPPED | OUTPATIENT
Start: 2025-04-07

## 2025-04-07 ASSESSMENT — ANXIETY QUESTIONNAIRES
6. BECOMING EASILY ANNOYED OR IRRITABLE: SEVERAL DAYS
1. FEELING NERVOUS, ANXIOUS, OR ON EDGE: SEVERAL DAYS
3. WORRYING TOO MUCH ABOUT DIFFERENT THINGS: SEVERAL DAYS
IF YOU CHECKED OFF ANY PROBLEMS ON THIS QUESTIONNAIRE, HOW DIFFICULT HAVE THESE PROBLEMS MADE IT FOR YOU TO DO YOUR WORK, TAKE CARE OF THINGS AT HOME, OR GET ALONG WITH OTHER PEOPLE: SOMEWHAT DIFFICULT
7. FEELING AFRAID AS IF SOMETHING AWFUL MIGHT HAPPEN: NOT AT ALL
8. IF YOU CHECKED OFF ANY PROBLEMS, HOW DIFFICULT HAVE THESE MADE IT FOR YOU TO DO YOUR WORK, TAKE CARE OF THINGS AT HOME, OR GET ALONG WITH OTHER PEOPLE?: SOMEWHAT DIFFICULT
GAD7 TOTAL SCORE: 6
7. FEELING AFRAID AS IF SOMETHING AWFUL MIGHT HAPPEN: NOT AT ALL
4. TROUBLE RELAXING: SEVERAL DAYS
GAD7 TOTAL SCORE: 6
2. NOT BEING ABLE TO STOP OR CONTROL WORRYING: SEVERAL DAYS
GAD7 TOTAL SCORE: 6
5. BEING SO RESTLESS THAT IT IS HARD TO SIT STILL: SEVERAL DAYS

## 2025-04-07 ASSESSMENT — PATIENT HEALTH QUESTIONNAIRE - PHQ9
SUM OF ALL RESPONSES TO PHQ QUESTIONS 1-9: 6
10. IF YOU CHECKED OFF ANY PROBLEMS, HOW DIFFICULT HAVE THESE PROBLEMS MADE IT FOR YOU TO DO YOUR WORK, TAKE CARE OF THINGS AT HOME, OR GET ALONG WITH OTHER PEOPLE: SOMEWHAT DIFFICULT
10. IF YOU CHECKED OFF ANY PROBLEMS, HOW DIFFICULT HAVE THESE PROBLEMS MADE IT FOR YOU TO DO YOUR WORK, TAKE CARE OF THINGS AT HOME, OR GET ALONG WITH OTHER PEOPLE: SOMEWHAT DIFFICULT
SUM OF ALL RESPONSES TO PHQ QUESTIONS 1-9: 6

## 2025-04-07 NOTE — PROGRESS NOTES
Joey is a 29 year old who is being evaluated via a billable video visit.    How would you like to obtain your AVS? MyChart  If the video visit is dropped, the invitation should be resent by: Send to e-mail at: [email or web address]  Will anyone else be joining your video visit? No      Assessment & Plan        Moderate episode of recurrent major depressive disorder (H)  Anxiety   Depression is well-controlled.  Anxiety is improving.  continue gabapentin 300 mg twice daily-refilled.  Continue Wellbutrin 300 mg daily, BuSpar 10 mg twice daily.       Depression is well controlled.   Anxiety is worse.    Continue wellbutrin 300mg daily which has been working well for depression  buspar 10mg twice daily was providing some benefit for anxiety. She increased the buspar to 15mg twice daily on her own and did not notice additional benefit. Will continue buspar 10mg twice daily  She reports drinking more alcohol and using cannabis to cope with anxiety and is adamant about prn medication-not interested in selective serotonin reuptake inhibitor or SNRI.   Offered hydroxyzine, but it make her too sleepy, even at 12.5mg.   She requests ativan because a friend takes it for anxiety, but I discussed I do not recommend benzo in general because it is habit forming. I have additional concern about using habit-forming medication given her report of increased alcohol use.   Discussed trial of gabapentin 300mg daily for anxiety - with additional possible benefit for reduction of alcohol use. She would like to try gabapentin. In addition, I have referred her to collaborative care psychiatry.   Continues to see her therapist   Follow-up with me in 2 to 4 weeks.      She did not respond to fluoxetine previously and was hospitalized with suicidal ideation after fluoxetine was started when she was in high school. She is very reluctant to try other medications.   Has therapy appt scheduled in May       Contraception  Doing well on OCP,  "which has been helpful to reduce bleeding and for additional mood regulation. No changes today. Refilled med.   Has paragard IUD in place as well - placed in 2018.     She has denied smoking cigarettes.     From last visit:  Has ParaGard IUD in place since 2018-strings visible on exam today  She is interested in switching to hormonal birth control to reduce or eliminate periods.  Discussed options and she is most interested in OCP at this time.  She has tolerated it well in the past.  Discussed potential benefits/side effects.   Start Seasonale.  She may consider Mirena IUD as well.   Had implanon placed in the past, but it was retrievable when it was time for removal.   She tolerated depo well in the past and would be open to using depo again as well.   Discussed she can keep the ParaGard IUD in for now as she considers the best future option.  She does not wish to have any children.      Will follow up with me at the time of her preventive visit in September    BMI  Estimated body mass index is 26.69 kg/m  as calculated from the following:    Height as of 9/3/24: 1.588 m (5' 2.5\").    Weight as of 9/3/24: 67.3 kg (148 lb 4.8 oz).           Myrna Cook is a 29 year old, presenting for the following health issues:  Follow Up (On medication )    History of Present Illness       Mental Health Follow-up:  Patient presents to follow-up on Depression & Anxiety.Patient's depression since last visit has been:  Better  The patient is not having other symptoms associated with depression.  Patient's anxiety since last visit has been:  Better  The patient is not having other symptoms associated with anxiety.  Any significant life events: No  Patient is feeling anxious or having panic attacks.  Patient has no concerns about alcohol or drug use.    She eats 2-3 servings of fruits and vegetables daily.She consumes 2 sweetened beverage(s) daily.She exercises with enough effort to increase her heart rate 30 to 60 minutes " "per day.  She exercises with enough effort to increase her heart rate 6 days per week. She is missing 2 dose(s) of medications per week.  She is not taking prescribed medications regularly due to remembering to take.   Joey Spears, 29 years    Birth Control Management  Joey has been using birth control pills and initially experienced spotting, which has since settled. She takes the regular pills again once bleeding stops. She reports improvement in mood shifts and is satisfied with the current regimen. She also has a Paragard IUD, which was placed in 2018, and is aware that it is effective for 12 years.    Medication Refill Issue  There was a confusion with the gabapentin refill at [facility] due to a change in dosage to twice a day, but it has been resolved. She will need future refills.           9/23/2024     2:53 PM 10/7/2024     4:50 PM 4/7/2025    11:26 AM   AYDIN-7 SCORE   Total Score 7 (mild anxiety) 7 (mild anxiety) 6 (mild anxiety)   Total Score 7 7 6        Patient-reported            1/20/2025     2:50 PM 2/3/2025     2:48 PM 4/7/2025    11:25 AM   PHQ   PHQ-9 Total Score 9  8  6    Q9: Thoughts of better off dead/self-harm past 2 weeks Not at all Not at all Not at all       Patient-reported             Objective    Vitals - Patient Reported  Weight (Patient Reported): 65.8 kg (145 lb)  Height (Patient Reported): 161.3 cm (5' 3.5\")  BMI (Based on Pt Reported Ht/Wt): 25.28  Pain Score: No Pain (0)      Vitals:  No vitals were obtained today due to virtual visit.    Physical Exam   GENERAL: alert and no distress  EYES: Eyes grossly normal to inspection.  No discharge or erythema, or obvious scleral/conjunctival abnormalities.  RESP: No audible wheeze, cough, or visible cyanosis.    SKIN: Visible skin clear. No significant rash, abnormal pigmentation or lesions.  NEURO: Cranial nerves grossly intact.  Mentation and speech appropriate for age.  PSYCH: Appropriate affect, tone, and pace of words     "       Video-Visit Details    Type of service:  Video Visit   Originating Location (pt. Location): Home    Distant Location (provider location):  On-site  Platform used for Video Visit: Jez  Signed Electronically by: Kaci Villar MD, MD

## 2025-04-07 NOTE — PROGRESS NOTES
[facility]                                     Progress Note    Patient Name: Joey Spears  Date:   4/7/2025        Service Type: Individual      Session Start Time: 2:05 pm  Session End Time: 2:59 pm     Session Length: 54 min    Session #: 68    Attendees: Client attended alone    Service Modality:    Telemedicine Visit: The patient's condition can be safely assessed and treated via synchronous audio and visual telemedicine encounter.      Reason for Telemedicine Visit: Patient has requested telehealth visit    Originating Site (Patient Location): Patient's home    Distant Site (Provider Location): Provider Remote Setting- Home Office    Consent:  The patient/guardian has verbally consented to: the potential risks and benefits of telemedicine (video visit) versus in person care; bill my insurance or make self-payment for services provided; and responsibility for payment of non-covered services.     Mode of Communication:  Video Conference via MovieSet    As the provider I attest to compliance with applicable laws and regulations related to telemedicine.    DATA  Extended Session (53+ minutes): PROLONGED SERVICE IN THE OUTPATIENT SETTING REQUIRING DIRECT (FACE-TO-FACE) PATIENT CONTACT BEYOND THE USUAL SERVICE:    - Patient's presenting concerns require more intensive intervention than could be completed within the usual service  Interactive Complexity: No  Crisis: No      Progress Since Last Session (Related to Symptoms / Goals / Homework):  Symptoms:  Improving reducing situational stressors    Homework: Achieved / completed to satisfaction      Episode of Care Goals: Satisfactory progress - ACTION (Actively working towards change); Intervened by reinforcing change plan / affirming steps taken     Current / Ongoing Stressors and Concerns:  Sister ED recovery relapse. Partner mental health/fx concerns. Working on communicating needs/expectations. Focus on boundaries, self care  to support wellbeing at work/in personal life. Ongoing No contact with mom, managing contact with stepdad. Continuing focus on positive-productive relationships/activities that support wellness, learning to prioritize personal needs.       Treatment Objective(s) Addressed in This Session:   Use boundaries and proactive communication in relationships.    Improve quantity and quality of night time sleep / decrease daytime naps   Identify negative self-talk and behaviors: challenge core beliefs, myths, and actions  Improve concentration, focus, and mindfulness in daily activities   Feel less fidgety, restless or slow in daily activities / interpersonal interactions    Interventions:  Supportive Therapy: Provided active listening and validation. Surfaced feelings about space, triggers/past dynamics related to home life with mom, acknowledged difference in dynamic with fiance.   Motivational Interviewing  Target Behavior: continue using   proactive communication skills to get needs met , continue no contact with mom, focusing on healthy and supportive relationships, creating personal time and space.   Stage of Change: ACTION (Actively working towards change)     MI Intervention: Expressed Empathy/Understanding, Supported Autonomy, Collaboration, Evocation, Open-ended questions, Reflections: simple and complex, and Change talk (evoked)     Change Talk Expressed by the Patient: Ability to change Reasons to change Need to change Committment to change Activation Taking steps    Provider Response to Change Talk: E - Evoked more info from patient about behavior change, A - Affirmed patient's thoughts, decisions, or attempts at behavior change, and R - Reflected patient's change talk     Assessments completed prior to visit: 2/15/21 DA  The following assessments were completed by patient for this visit:      PHQ9:       8/10/2023     2:04 PM 8/28/2023     8:17 AM 9/3/2024     7:00 AM 9/23/2024     2:52 PM 1/20/2025     2:50 PM  2/3/2025     2:48 PM 4/7/2025    11:25 AM   PHQ-9 SCORE   PHQ-9 Total Score MyChart 10 (Moderate depression) 8 (Mild depression) 10 (Moderate depression) 5 (Mild depression) 9 (Mild depression) 8 (Mild depression) 6 (Mild depression)   PHQ-9 Total Score 10 8 10 5 9  8  6        Patient-reported     GAD7:       5/22/2023     9:34 AM 6/5/2023     8:30 AM 7/24/2023     8:45 AM 8/28/2023     8:19 AM 9/23/2024     2:53 PM 10/7/2024     4:50 PM 4/7/2025    11:26 AM   AYDIN-7 SCORE   Total Score 10 (moderate anxiety) 8 (mild anxiety) 13 (moderate anxiety) 15 (severe anxiety) 7 (mild anxiety) 7 (mild anxiety) 6 (mild anxiety)   Total Score 10 8 13 15 7 7 6        Patient-reported     PROMIS 10-Global Health (only subscores and total score):       2/6/2023     9:41 AM 2/20/2023     9:13 AM 5/22/2023     9:35 AM 6/5/2023     8:31 AM 9/23/2024     2:54 PM 1/20/2025     2:51 PM 2/3/2025     2:49 PM   PROMIS-10 Scores Only   Global Mental Health Score 13 13 13 13 13 12  12    Global Physical Health Score 18 17 16 17 17 17  17    PROMIS TOTAL - SUBSCORES 31 30 29 30 30 29  29        Patient-reported      ASSESSMENT: Current Emotional / Mental Status (status of significant symptoms):   Risk status (Self / Other harm or suicidal ideation)   Patient denies current fears or concerns for personal safety.   Patient denies current or recent suicidal ideation or behaviors.   Patient denies current or recent homicidal ideation or behaviors.   Patient denies current or recent self injurious behavior or ideation.   Patient denies other safety concerns.   Patient reports there has been no change in risk factors since their last session.     Patient reports there has been no change in protective factors since their last session.     Recommended that patient call 911 or go to the local ED should there be a change in any of these risk factors     Appearance:   Appropriate    Eye Contact:   Good    Psychomotor Behavior: Normal     Attitude:   Cooperative  Friendly   Orientation:   All   Speech    Rate / Production: Normal     Volume:  Normal    Mood:    Anxious  Depressed ,   Affect:    Appropriate    Thought Content:  Clear    Thought Form:  Circumstantial   Insight:    Good      Medication Review:   No changes to current psychiatric medication(s)     Medication Compliance:   Yes     Changes in Health Issues:   None noted     Chemical Use Review:   Substance Use: Chemical use reviewed, no active concerns identified      Tobacco Use: No current tobacco use.      Diagnosis:  1. Major depressive disorder, recurrent episode, mild with anxious distress        Collateral Reports Completed:   Not Applicable    PLAN: (Patient Tasks / Therapist Tasks / Other)  Continue using PATRIZIA, FAST and Check the Facts engage intentionally with supportive people, prioritize family/friend/me  time. Maintain no communication with mother, appropriate boundary with stepdad, other individuals who require it.  Use partner proactive communication to address concerns.    DEE Walden LICSW 4/7/2025        _____________________________________________________________  Individual Treatment Plan    Patient's Name: Joey Spears  YOB: 1995    Date of Creation:   9/13/2021  Date Treatment Plan Last Reviewed/Revised: 4/7/25    There has been demonstrated improvement in functioning while patient has been engaged in psychotherapy/psychological service- if withdrawn the patient would deteriorate and/or relapse.     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    Anticipated number of session for  this episode of care:   Anticipation frequency of session: Every other week  Anticipated Duration of each session: 38-52 minutes  Treatment plan will be reviewed in 90 days or when goals have been changed.     MeasurableTreatment Goal(s) related to diagnosis / functional impairment(s)  Goal 1: Client will experience an improvement in mood and anxiety evidenced by self report and AYDIN/PHQ scores of 5 or less    I will know I've met my goal when I'm enjoying my life more (paraphrase) .      Objective #A (Client Action)    Client will Increase interest, engagement, and pleasure in doing things  Decrease frequency and intensity of feeling down, depressed, hopeless  Improve quantity and quality of night time sleep / decrease daytime naps  Feel less tired and more energy during the day   Identify negative self-talk and behaviors: challenge core beliefs, myths, and actions  Improve concentration, focus, and mindfulness in daily activities   Feel less fidgety, restless or slow in daily activities / interpersonal interactions  Use boundaries and proactive communication in relationships.  Status:    4/7/2025    Intervention(s)  Therapist will teach behavioral activation, sleep hygiene, CBT, DBT and ACT skills, proactive communication, mindfulness, grounding skills to support mood improvement and anxiety reduction.    Patient has reviewed and agreed to the above plan.      DEE Walden,  [facility] HospitalSW 4/7/2025

## 2025-04-21 ENCOUNTER — VIRTUAL VISIT (OUTPATIENT)
Facility: CLINIC | Age: 30
End: 2025-04-21
Payer: COMMERCIAL

## 2025-04-21 DIAGNOSIS — F33.0 MAJOR DEPRESSIVE DISORDER, RECURRENT EPISODE, MILD WITH ANXIOUS DISTRESS: Primary | ICD-10-CM

## 2025-04-21 PROCEDURE — 90837 PSYTX W PT 60 MINUTES: CPT | Mod: 95

## 2025-04-21 NOTE — PROGRESS NOTES
[facility]                                     Progress Note    Patient Name: Joey Spears  Date:   4/21/2025        Service Type: Individual      Session Start Time: 12:05 pm  Session End Time: 12:59 pm     Session Length: 54 min    Session #: 69    Attendees: Client attended alone    Service Modality:    Telemedicine Visit: The patient's condition can be safely assessed and treated via synchronous audio and visual telemedicine encounter.      Reason for Telemedicine Visit: Patient has requested telehealth visit    Originating Site (Patient Location): Patient's home    Distant Site (Provider Location): Provider Remote Setting- Home Office    Consent:  The patient/guardian has verbally consented to: the potential risks and benefits of telemedicine (video visit) versus in person care; bill my insurance or make self-payment for services provided; and responsibility for payment of non-covered services.     Mode of Communication:  Video Conference via [employer]    As the provider I attest to compliance with applicable laws and regulations related to telemedicine.    DATA  Extended Session (53+ minutes): PROLONGED SERVICE IN THE OUTPATIENT SETTING REQUIRING DIRECT (FACE-TO-FACE) PATIENT CONTACT BEYOND THE USUAL SERVICE:    - Patient's presenting concerns require more intensive intervention than could be completed within the usual service  Interactive Complexity: No  Crisis: No      Progress Since Last Session (Related to Symptoms / Goals / Homework):  Symptoms:  Improving reducing situational stressors    Homework: Achieved / completed to satisfaction      Episode of Care Goals: Satisfactory progress - ACTION (Actively working towards change); Intervened by reinforcing change plan / affirming steps taken     Current / Ongoing Stressors and Concerns:  Sister ED recovery relapse. Partner mental health/fx concerns. Working on communicating needs/expectations. Focus on boundaries, self  care to support wellbeing at work/in personal life. Ongoing No contact with mom, managing contact with stepdad. Continuing focus on positive-productive relationships/activities that support wellness, learning to prioritize personal needs.       Treatment Objective(s) Addressed in This Session:   Use boundaries and proactive communication in relationships.    Improve quantity and quality of night time sleep / decrease daytime naps   Identify negative self-talk and behaviors: challenge core beliefs, myths, and actions  Improve concentration, focus, and mindfulness in daily activities   Feel less fidgety, restless or slow in daily activities / interpersonal interactions    Interventions:  Supportive Therapy: Provided active listening and validation. Surfaced feelings about space, triggers/past dynamics related to home life with mom, acknowledged difference in dynamic with fiance.   Motivational Interviewing  Target Behavior: continue using   proactive communication skills to get needs met , continue no contact with mom, focusing on healthy and supportive relationships, creating personal time and space.   Stage of Change: ACTION (Actively working towards change)     MI Intervention: Expressed Empathy/Understanding, Supported Autonomy, Collaboration, Evocation, Open-ended questions, Reflections: simple and complex, and Change talk (evoked)     Change Talk Expressed by the Patient: Ability to change Reasons to change Need to change Committment to change Activation Taking steps    Provider Response to Change Talk: E - Evoked more info from patient about behavior change, A - Affirmed patient's thoughts, decisions, or attempts at behavior change, and R - Reflected patient's change talk     Assessments completed prior to visit: 2/15/21 DA  The following assessments were completed by patient for this visit:      PHQ9:       8/10/2023     2:04 PM 8/28/2023     8:17 AM 9/3/2024     7:00 AM 9/23/2024     2:52 PM 1/20/2025      2:50 PM 2/3/2025     2:48 PM 4/7/2025    11:25 AM   PHQ-9 SCORE   PHQ-9 Total Score MyChart 10 (Moderate depression) 8 (Mild depression) 10 (Moderate depression) 5 (Mild depression) 9 (Mild depression) 8 (Mild depression) 6 (Mild depression)   PHQ-9 Total Score 10 8 10 5 9  8  6        Patient-reported     GAD7:       5/22/2023     9:34 AM 6/5/2023     8:30 AM 7/24/2023     8:45 AM 8/28/2023     8:19 AM 9/23/2024     2:53 PM 10/7/2024     4:50 PM 4/7/2025    11:26 AM   AYDIN-7 SCORE   Total Score 10 (moderate anxiety) 8 (mild anxiety) 13 (moderate anxiety) 15 (severe anxiety) 7 (mild anxiety) 7 (mild anxiety) 6 (mild anxiety)   Total Score 10 8 13 15 7 7 6        Patient-reported     PROMIS 10-Global Health (only subscores and total score):       2/6/2023     9:41 AM 2/20/2023     9:13 AM 5/22/2023     9:35 AM 6/5/2023     8:31 AM 9/23/2024     2:54 PM 1/20/2025     2:51 PM 2/3/2025     2:49 PM   PROMIS-10 Scores Only   Global Mental Health Score 13 13 13 13 13 12  12    Global Physical Health Score 18 17 16 17 17 17  17    PROMIS TOTAL - SUBSCORES 31 30 29 30 30 29  29        Patient-reported      ASSESSMENT: Current Emotional / Mental Status (status of significant symptoms):   Risk status (Self / Other harm or suicidal ideation)   Patient denies current fears or concerns for personal safety.   Patient denies current or recent suicidal ideation or behaviors.   Patient denies current or recent homicidal ideation or behaviors.   Patient denies current or recent self injurious behavior or ideation.   Patient denies other safety concerns.   Patient reports there has been no change in risk factors since their last session.     Patient reports there has been no change in protective factors since their last session.     Recommended that patient call 911 or go to the local ED should there be a change in any of these risk factors     Appearance:   Appropriate    Eye Contact:   Good    Psychomotor Behavior: Normal     Attitude:   Cooperative  Friendly   Orientation:   All   Speech    Rate / Production: Normal     Volume:  Normal    Mood:    Anxious  Depressed ,   Affect:    Appropriate    Thought Content:  Clear    Thought Form:  Circumstantial   Insight:    Good      Medication Review:   No changes to current psychiatric medication(s)     Medication Compliance:   Yes     Changes in Health Issues:   None noted     Chemical Use Review:   Substance Use: Chemical use reviewed, no active concerns identified      Tobacco Use: No current tobacco use.      Diagnosis:  1. Major depressive disorder, recurrent episode, mild with anxious distress        Collateral Reports Completed:   Not Applicable    PLAN: (Patient Tasks / Therapist Tasks / Other)  Continue using PATRIZIA, FAST and Check the Facts engage intentionally with supportive people, prioritize family/friend/me  time. Maintain no communication with mother, appropriate boundary with stepdad, other individuals who require it.  Use partner proactive communication to address concerns.    DEE Walden LICSW 4/21/2025        _____________________________________________________________  Individual Treatment Plan    Patient's Name: Joey Spears  YOB: 1995    Date of Creation:   9/13/2021  Date Treatment Plan Last Reviewed/Revised: 4/7/25    There has been demonstrated improvement in functioning while patient has been engaged in psychotherapy/psychological service- if withdrawn the patient would deteriorate and/or relapse.     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    Anticipated number of session for  this episode of care:   Anticipation frequency of session: Every other week  Anticipated Duration of each session: 38-52 minutes  Treatment plan will be reviewed in 90 days or when goals have been changed.     MeasurableTreatment Goal(s) related to diagnosis / functional impairment(s)  Goal 1: Client will experience an improvement in mood and anxiety evidenced by self report and AYDIN/PHQ scores of 5 or less    I will know I've met my goal when I'm enjoying my life more (paraphrase) .      Objective #A (Client Action)    Client will Increase interest, engagement, and pleasure in doing things  Decrease frequency and intensity of feeling down, depressed, hopeless  Improve quantity and quality of night time sleep / decrease daytime naps  Feel less tired and more energy during the day   Identify negative self-talk and behaviors: challenge core beliefs, myths, and actions  Improve concentration, focus, and mindfulness in daily activities   Feel less fidgety, restless or slow in daily activities / interpersonal interactions  Use boundaries and proactive communication in relationships.  Status:    4/7/2025    Intervention(s)  Therapist will teach behavioral activation, sleep hygiene, CBT, DBT and ACT skills, proactive communication, mindfulness, grounding skills to support mood improvement and anxiety reduction.    Patient has reviewed and agreed to the above plan.      DEE Walden,  [facility] C.A. Dean HospitalSW 4/7/2025

## 2025-05-05 ENCOUNTER — VIRTUAL VISIT (OUTPATIENT)
Facility: CLINIC | Age: 30
End: 2025-05-05
Payer: COMMERCIAL

## 2025-05-05 DIAGNOSIS — F33.0 MAJOR DEPRESSIVE DISORDER, RECURRENT EPISODE, MILD WITH ANXIOUS DISTRESS: Primary | ICD-10-CM

## 2025-05-05 PROCEDURE — 90837 PSYTX W PT 60 MINUTES: CPT | Mod: 95

## 2025-05-05 NOTE — PROGRESS NOTES
[facility]                                     Progress Note    Patient Name: Joey Spears  Date:   5/5/2025        Service Type: Individual      Session Start Time: 1:03 pm  Session End Time: 1:57 pm     Session Length: 54 min    Session #: 70    Attendees: Client attended alone    Service Modality:    Telemedicine Visit: The patient's condition can be safely assessed and treated via synchronous audio and visual telemedicine encounter.      Reason for Telemedicine Visit: Patient has requested telehealth visit    Originating Site (Patient Location): Patient's home    Distant Site (Provider Location): Provider Remote Setting- Home Office    Consent:  The patient/guardian has verbally consented to: the potential risks and benefits of telemedicine (video visit) versus in person care; bill my insurance or make self-payment for services provided; and responsibility for payment of non-covered services.     Mode of Communication:  Video Conference via [employer]    As the provider I attest to compliance with applicable laws and regulations related to telemedicine.    DATA  Extended Session (53+ minutes): PROLONGED SERVICE IN THE OUTPATIENT SETTING REQUIRING DIRECT (FACE-TO-FACE) PATIENT CONTACT BEYOND THE USUAL SERVICE:    - Patient's presenting concerns require more intensive intervention than could be completed within the usual service  Interactive Complexity: No  Crisis: No      Progress Since Last Session (Related to Symptoms / Goals / Homework):  Symptoms:  Improving reducing situational stressors    Homework: Achieved / completed to satisfaction      Episode of Care Goals: Satisfactory progress - ACTION (Actively working towards change); Intervened by reinforcing change plan / affirming steps taken     Current / Ongoing Stressors and Concerns:  Sister ED recovery relapse/self harming, pronoun change. Partner mental health/fx concerns. Communicating needs/expectations. Focus on  boundaries, self care to support wellbeing at work/in personal life. Ongoing No contact with mom, managing contact with stepdad. Continuing focus on positive-productive relationships/activities that support wellness, learning to prioritize personal needs.       Treatment Objective(s) Addressed in This Session:   Use boundaries and proactive communication in relationships.    Improve quantity and quality of night time sleep / decrease daytime naps   Identify negative self-talk and behaviors: challenge core beliefs, myths, and actions  Improve concentration, focus, and mindfulness in daily activities   Feel less fidgety, restless or slow in daily activities / interpersonal interactions    Interventions:  Supportive Therapy: Provided active listening and validation. Surfaced feelings about space, triggers/past dynamics related to home life with mom, acknowledged difference in dynamic with fiance.   Motivational Interviewing  Target Behavior: continue using   proactive communication skills to get needs met , continue no contact with mom, focusing on healthy and supportive relationships, creating personal time and space.   Stage of Change: ACTION (Actively working towards change)     MI Intervention: Expressed Empathy/Understanding, Supported Autonomy, Collaboration, Evocation, Open-ended questions, Reflections: simple and complex, and Change talk (evoked)     Change Talk Expressed by the Patient: Ability to change Reasons to change Need to change Committment to change Activation Taking steps    Provider Response to Change Talk: E - Evoked more info from patient about behavior change, A - Affirmed patient's thoughts, decisions, or attempts at behavior change, and R - Reflected patient's change talk     Assessments completed prior to visit: 2/15/21 DA  The following assessments were completed by patient for this visit:      PHQ9:       8/10/2023     2:04 PM 8/28/2023     8:17 AM 9/3/2024     7:00 AM 9/23/2024     2:52 PM  1/20/2025     2:50 PM 2/3/2025     2:48 PM 4/7/2025    11:25 AM   PHQ-9 SCORE   PHQ-9 Total Score MyChart 10 (Moderate depression) 8 (Mild depression) 10 (Moderate depression) 5 (Mild depression) 9 (Mild depression) 8 (Mild depression) 6 (Mild depression)   PHQ-9 Total Score 10 8 10 5 9  8  6        Patient-reported     GAD7:       5/22/2023     9:34 AM 6/5/2023     8:30 AM 7/24/2023     8:45 AM 8/28/2023     8:19 AM 9/23/2024     2:53 PM 10/7/2024     4:50 PM 4/7/2025    11:26 AM   AYDIN-7 SCORE   Total Score 10 (moderate anxiety) 8 (mild anxiety) 13 (moderate anxiety) 15 (severe anxiety) 7 (mild anxiety) 7 (mild anxiety) 6 (mild anxiety)   Total Score 10 8 13 15 7 7 6        Patient-reported     PROMIS 10-Global Health (only subscores and total score):       2/6/2023     9:41 AM 2/20/2023     9:13 AM 5/22/2023     9:35 AM 6/5/2023     8:31 AM 9/23/2024     2:54 PM 1/20/2025     2:51 PM 2/3/2025     2:49 PM   PROMIS-10 Scores Only   Global Mental Health Score 13 13 13 13 13 12  12    Global Physical Health Score 18 17 16 17 17 17  17    PROMIS TOTAL - SUBSCORES 31 30 29 30 30 29  29        Patient-reported      ASSESSMENT: Current Emotional / Mental Status (status of significant symptoms):   Risk status (Self / Other harm or suicidal ideation)   Patient denies current fears or concerns for personal safety.   Patient denies current or recent suicidal ideation or behaviors.   Patient denies current or recent homicidal ideation or behaviors.   Patient denies current or recent self injurious behavior or ideation.   Patient denies other safety concerns.   Patient reports there has been no change in risk factors since their last session.     Patient reports there has been no change in protective factors since their last session.     Recommended that patient call 911 or go to the local ED should there be a change in any of these risk factors     Appearance:   Appropriate    Eye Contact:   Good    Psychomotor  Behavior: Normal    Attitude:   Cooperative  Friendly   Orientation:   All   Speech    Rate / Production: Normal     Volume:  Normal    Mood:    Anxious  Depressed ,   Affect:    Appropriate    Thought Content:  Clear    Thought Form:  Circumstantial   Insight:    Good      Medication Review:   No changes to current psychiatric medication(s)     Medication Compliance:   Yes     Changes in Health Issues:   None noted     Chemical Use Review:   Substance Use: Chemical use reviewed, no active concerns identified      Tobacco Use: No current tobacco use.      Diagnosis:  1. Major depressive disorder, recurrent episode, mild with anxious distress        Collateral Reports Completed:   Not Applicable    PLAN: (Patient Tasks / Therapist Tasks / Other)  Continue using PATRIZIA, FAST and Check the Facts engage intentionally with supportive people, prioritize family/friend/me  time. Maintain no communication with mother, appropriate boundary with stepdad, other individuals who require it.  Use partner proactive communication to address concerns.    DEE Walden LICSW 5/5/2025        _____________________________________________________________  Individual Treatment Plan    Patient's Name: Joey Spears  YOB: 1995    Date of Creation:   9/13/2021  Date Treatment Plan Last Reviewed/Revised: 4/7/25    There has been demonstrated improvement in functioning while patient has been engaged in psychotherapy/psychological service- if withdrawn the patient would deteriorate and/or relapse.     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    Anticipated number  of session for this episode of care:   Anticipation frequency of session: Every other week  Anticipated Duration of each session: 38-52 minutes  Treatment plan will be reviewed in 90 days or when goals have been changed.     MeasurableTreatment Goal(s) related to diagnosis / functional impairment(s)  Goal 1: Client will experience an improvement in mood and anxiety evidenced by self report and AYDIN/PHQ scores of 5 or less    I will know I've met my goal when I'm enjoying my life more (paraphrase) .      Objective #A (Client Action)    Client will Increase interest, engagement, and pleasure in doing things  Decrease frequency and intensity of feeling down, depressed, hopeless  Improve quantity and quality of night time sleep / decrease daytime naps  Feel less tired and more energy during the day   Identify negative self-talk and behaviors: challenge core beliefs, myths, and actions  Improve concentration, focus, and mindfulness in daily activities   Feel less fidgety, restless or slow in daily activities / interpersonal interactions  Use boundaries and proactive communication in relationships.  Status:    4/7/2025    Intervention(s)  Therapist will teach behavioral activation, sleep hygiene, CBT, DBT and ACT skills, proactive communication, mindfulness, grounding skills to support mood improvement and anxiety reduction.    Patient has reviewed and agreed to the above plan.      DEE Walden,  LICSW 4/7/2025

## 2025-05-19 ENCOUNTER — VIRTUAL VISIT (OUTPATIENT)
Facility: CLINIC | Age: 30
End: 2025-05-19
Payer: COMMERCIAL

## 2025-05-19 DIAGNOSIS — F33.0 MAJOR DEPRESSIVE DISORDER, RECURRENT EPISODE, MILD WITH ANXIOUS DISTRESS: Primary | ICD-10-CM

## 2025-05-19 PROCEDURE — 90837 PSYTX W PT 60 MINUTES: CPT | Mod: 95

## 2025-05-19 NOTE — PROGRESS NOTES
[facility]                                     Progress Note    Patient Name: Joey Spears  Date:   5/19/2025        Service Type: Individual      Session Start Time: 10:02 am  Session End Time: 10:55 am     Session Length: 53 min    Session #: 71    Attendees: Client attended alone    Service Modality:    Telemedicine Visit: The patient's condition can be safely assessed and treated via synchronous audio and visual telemedicine encounter.      Reason for Telemedicine Visit: Patient has requested telehealth visit    Originating Site (Patient Location): Patient's home    Distant Site (Provider Location): Provider Remote Setting- Home Office    Consent:  The patient/guardian has verbally consented to: the potential risks and benefits of telemedicine (video visit) versus in person care; bill my insurance or make self-payment for services provided; and responsibility for payment of non-covered services.     Mode of Communication:  Video Conference via [employer]    As the provider I attest to compliance with applicable laws and regulations related to telemedicine.    DATA  Extended Session (53+ minutes): PROLONGED SERVICE IN THE OUTPATIENT SETTING REQUIRING DIRECT (FACE-TO-FACE) PATIENT CONTACT BEYOND THE USUAL SERVICE:    - Patient's presenting concerns require more intensive intervention than could be completed within the usual service  Interactive Complexity: No  Crisis: No      Progress Since Last Session (Related to Symptoms / Goals / Homework):  Symptoms: Improving reducing situational stressors   Homework: Achieved / completed to satisfaction      Episode of Care Goals: Satisfactory progress - ACTION (Actively working towards change); Intervened by reinforcing change plan / affirming steps taken     Current / Ongoing Stressors and Concerns:  Sibling (they) ED recovery relapse/self harming, pronoun change. Partner mental health/fx concerns. Communicating needs/expectations. Mom  exposure via social triggering emotions. Focus on boundaries, self care to support wellbeing at work/in personal life. Ongoing No contact with mom, managing contact with stepdad. Continuing focus on positive-productive relationships/activities that support wellness, learning to prioritize personal needs.       Treatment Objective(s) Addressed in This Session:   Use boundaries and proactive communication in relationships.   Improve quantity and quality of night time sleep / decrease daytime naps   Identify negative self-talk and behaviors: challenge core beliefs, myths, and actions  Improve concentration, focus, and mindfulness in daily activities   Feel less fidgety, restless or slow in daily activities / interpersonal interactions    Interventions:  Supportive Therapy: Provided active listening and validation. Surfaced feelings about space, need for time alone. Introduced loving kindness meditation  Motivational Interviewing  Target Behavior: continue using  proactive communication skills to get needs met, continue no contact with mom, focusing on healthy and supportive relationships, creating personal time and space.   Stage of Change: ACTION (Actively working towards change)     MI Intervention: Expressed Empathy/Understanding, Supported Autonomy, Collaboration, Evocation, Open-ended questions, Reflections: simple and complex, and Change talk (evoked)     Change Talk Expressed by the Patient: Ability to change Reasons to change Need to change Committment to change Activation Taking steps    Provider Response to Change Talk: E - Evoked more info from patient about behavior change, A - Affirmed patient's thoughts, decisions, or attempts at behavior change, and R - Reflected patient's change talk     Assessments completed prior to visit: 2/15/21 DA  The following assessments were completed by patient for this visit:      PHQ9:       8/10/2023     2:04 PM 8/28/2023     8:17 AM 9/3/2024     7:00 AM 9/23/2024     2:52  PM 1/20/2025     2:50 PM 2/3/2025     2:48 PM 4/7/2025    11:25 AM   PHQ-9 SCORE   PHQ-9 Total Score MyChart 10 (Moderate depression) 8 (Mild depression) 10 (Moderate depression) 5 (Mild depression) 9 (Mild depression) 8 (Mild depression) 6 (Mild depression)   PHQ-9 Total Score 10 8 10 5 9  8  6        Patient-reported     GAD7:       5/22/2023     9:34 AM 6/5/2023     8:30 AM 7/24/2023     8:45 AM 8/28/2023     8:19 AM 9/23/2024     2:53 PM 10/7/2024     4:50 PM 4/7/2025    11:26 AM   AYDIN-7 SCORE   Total Score 10 (moderate anxiety) 8 (mild anxiety) 13 (moderate anxiety) 15 (severe anxiety) 7 (mild anxiety) 7 (mild anxiety) 6 (mild anxiety)   Total Score 10 8 13 15 7 7 6        Patient-reported     PROMIS 10-Global Health (only subscores and total score):       2/6/2023     9:41 AM 2/20/2023     9:13 AM 5/22/2023     9:35 AM 6/5/2023     8:31 AM 9/23/2024     2:54 PM 1/20/2025     2:51 PM 2/3/2025     2:49 PM   PROMIS-10 Scores Only   Global Mental Health Score 13 13 13 13 13 12  12    Global Physical Health Score 18 17 16 17 17 17  17    PROMIS TOTAL - SUBSCORES 31 30 29 30 30 29  29        Patient-reported      ASSESSMENT: Current Emotional / Mental Status (status of significant symptoms):   Risk status (Self / Other harm or suicidal ideation)   Patient denies current fears or concerns for personal safety.   Patient denies current or recent suicidal ideation or behaviors.   Patient denies current or recent homicidal ideation or behaviors.   Patient denies current or recent self injurious behavior or ideation.   Patient denies other safety concerns.   Patient reports there has been no change in risk factors since their last session.     Patient reports there has been no change in protective factors since their last session.     Recommended that patient call 911 or go to the local ED should there be a change in any of these risk factors     Appearance:   Appropriate    Eye Contact:   Good    Psychomotor  Behavior: Normal    Attitude:   Cooperative  Friendly   Orientation:   All   Speech    Rate / Production: Normal     Volume:  Normal    Mood:    Anxious  Depressed ,   Affect:    Appropriate    Thought Content:  Clear    Thought Form:  Circumstantial   Insight:    Good      Medication Review:   No changes to current psychiatric medication(s)     Medication Compliance:   Yes     Changes in Health Issues:   None noted     Chemical Use Review:   Substance Use: Chemical use reviewed, no active concerns identified      Tobacco Use: No current tobacco use.      Diagnosis:  1. Major depressive disorder, recurrent episode, mild with anxious distress        Collateral Reports Completed:   Not Applicable    PLAN: (Patient Tasks / Therapist Tasks / Other)  Continue using PATRIZIA, FAST and Check the Facts engage intentionally with supportive people, prioritize family/friend/me  time. Maintain no communication with mother, appropriate boundary with stepdad, other individuals who require it.  Use partner proactive communication to address concerns. [place] kindness meditation    DEE Walden [facility] 5/19/2025        _____________________________________________________________  Individual Treatment Plan    Patient's Name: Joey Spears  YOB: 1995    Date of Creation:   9/13/2021  Date Treatment Plan Last Reviewed/Revised: 4/7/25    There has been demonstrated improvement in functioning while patient has been engaged in psychotherapy/psychological service- if withdrawn the patient would deteriorate and/or relapse.     DSM5 Diagnoses: 296.21 (F32.0) Major Depressive Disorder, Single Episode, Mild With anxious distress  Psychosocial / Contextual Factors: strained mother relationship; covid fatigue; public facing role with substantial emotional burden  PROMIS (reviewed every 90 days): 30 10/25/22     30 9/23/24    Referral / Collaboration:  Referral to another professional/service is not indicated at this  time.    Anticipated number of session for this episode of care:   Anticipation frequency of session: Every other week  Anticipated Duration of each session: 38-52 minutes  Treatment plan will be reviewed in 90 days or when goals have been changed.     MeasurableTreatment Goal(s) related to diagnosis / functional impairment(s)  Goal 1: Client will experience an improvement in mood and anxiety evidenced by self report and AYDIN/PHQ scores of 5 or less    I will know I've met my goal when I'm enjoying my life more (paraphrase) .      Objective #A (Client Action)    Client will Increase interest, engagement, and pleasure in doing things  Decrease frequency and intensity of feeling down, depressed, hopeless  Improve quantity and quality of night time sleep / decrease daytime naps  Feel less tired and more energy during the day   Identify negative self-talk and behaviors: challenge core beliefs, myths, and actions  Improve concentration, focus, and mindfulness in daily activities   Feel less fidgety, restless or slow in daily activities / interpersonal interactions  Use boundaries and proactive communication in relationships.  Status:    4/7/2025    Intervention(s)  Therapist will teach behavioral activation, sleep hygiene, CBT, DBT and ACT skills, proactive communication, mindfulness, grounding skills to support mood improvement and anxiety reduction.    Patient has reviewed and agreed to the above plan.      DEE Walden,  LICSW 4/7/2025

## 2025-06-16 ENCOUNTER — VIRTUAL VISIT (OUTPATIENT)
Facility: CLINIC | Age: 30
End: 2025-06-16
Payer: COMMERCIAL

## 2025-06-16 DIAGNOSIS — F33.0 MAJOR DEPRESSIVE DISORDER, RECURRENT EPISODE, MILD WITH ANXIOUS DISTRESS: Primary | ICD-10-CM

## 2025-06-16 PROCEDURE — 90837 PSYTX W PT 60 MINUTES: CPT | Mod: 95

## 2025-06-16 ASSESSMENT — ANXIETY QUESTIONNAIRES
GAD7 TOTAL SCORE: 12
6. BECOMING EASILY ANNOYED OR IRRITABLE: MORE THAN HALF THE DAYS
7. FEELING AFRAID AS IF SOMETHING AWFUL MIGHT HAPPEN: SEVERAL DAYS
GAD7 TOTAL SCORE: 12
IF YOU CHECKED OFF ANY PROBLEMS ON THIS QUESTIONNAIRE, HOW DIFFICULT HAVE THESE PROBLEMS MADE IT FOR YOU TO DO YOUR WORK, TAKE CARE OF THINGS AT HOME, OR GET ALONG WITH OTHER PEOPLE: SOMEWHAT DIFFICULT
7. FEELING AFRAID AS IF SOMETHING AWFUL MIGHT HAPPEN: SEVERAL DAYS
8. IF YOU CHECKED OFF ANY PROBLEMS, HOW DIFFICULT HAVE THESE MADE IT FOR YOU TO DO YOUR WORK, TAKE CARE OF THINGS AT HOME, OR GET ALONG WITH OTHER PEOPLE?: SOMEWHAT DIFFICULT
1. FEELING NERVOUS, ANXIOUS, OR ON EDGE: MORE THAN HALF THE DAYS
2. NOT BEING ABLE TO STOP OR CONTROL WORRYING: MORE THAN HALF THE DAYS
5. BEING SO RESTLESS THAT IT IS HARD TO SIT STILL: MORE THAN HALF THE DAYS
GAD7 TOTAL SCORE: 12
3. WORRYING TOO MUCH ABOUT DIFFERENT THINGS: SEVERAL DAYS
4. TROUBLE RELAXING: MORE THAN HALF THE DAYS

## 2025-06-16 NOTE — PROGRESS NOTES
[facility]                                     Progress Note    Patient Name: Joey Spears  Date:   6/16/2025        Service Type: Individual      Session Start Time: 1:02 pm  Session End Time: 1:55 pm     Session Length: 53 min    Session #: 72    Attendees: Client attended alone    Service Modality:    Telemedicine Visit: The patient's condition can be safely assessed and treated via synchronous audio and visual telemedicine encounter.      Reason for Telemedicine Visit: Patient has requested telehealth visit    Originating Site (Patient Location): Patient's home    Distant Site (Provider Location): Provider Remote Setting- Home Office    Consent:  The patient/guardian has verbally consented to: the potential risks and benefits of telemedicine (video visit) versus in person care; bill my insurance or make self-payment for services provided; and responsibility for payment of non-covered services.     Mode of Communication:  Video Conference via [employer]    As the provider I attest to compliance with applicable laws and regulations related to telemedicine.    DATA  Extended Session (53+ minutes): PROLONGED SERVICE IN THE OUTPATIENT SETTING REQUIRING DIRECT (FACE-TO-FACE) PATIENT CONTACT BEYOND THE USUAL SERVICE:    - Patient's presenting concerns require more intensive intervention than could be completed within the usual service  Interactive Complexity: No  Crisis: No      Progress Since Last Session (Related to Symptoms / Goals / Homework):  Symptoms: Improving reducing situational stressors   Homework: Achieved / completed to satisfaction      Episode of Care Goals: Satisfactory progress - ACTION (Actively working towards change); Intervened by reinforcing change plan / affirming steps taken     Current / Ongoing Stressors and Concerns:  Sibling (they) ED recovery return to home.  Partner mental health/fx concerns. Communicating needs/expectations. Focus on boundaries, self  care to support wellbeing at work/in personal life. Ongoing No contact with mom, managing contact with stepdad. Continuing focus on positive-productive relationships/activities that support wellness, learning to prioritize personal needs.       Treatment Objective(s) Addressed in This Session:   Use boundaries and proactive communication in relationships.   Improve quantity and quality of night time sleep / decrease daytime naps   Identify negative self-talk and behaviors: challenge core beliefs, myths, and actions  Improve concentration, focus, and mindfulness in daily activities   Feel less fidgety, restless or slow in daily activities / interpersonal interactions    Interventions:  Supportive Therapy: Provided active listening and validation. Endorsed fitness plan. Introduced leaves on  a stream meditation  Motivational Interviewing  Target Behavior: continue using  proactive communication skills to get needs met, continue no contact with mom, focusing on healthy and supportive relationships, creating personal time and space.   Stage of Change: ACTION (Actively working towards change)     MI Intervention: Expressed Empathy/Understanding, Supported Autonomy, Collaboration, Evocation, Open-ended questions, Reflections: simple and complex, and Change talk (evoked)     Change Talk Expressed by the Patient: Ability to change Reasons to change Need to change Committment to change Activation Taking steps    Provider Response to Change Talk: E - Evoked more info from patient about behavior change, A - Affirmed patient's thoughts, decisions, or attempts at behavior change, and R - Reflected patient's change talk     Assessments completed prior to visit: 2/15/21 DA  The following assessments were completed by patient for this visit:      PHQ9:       8/28/2023     8:17 AM 9/3/2024     7:00 AM 9/23/2024     2:52 PM 1/20/2025     2:50 PM 2/3/2025     2:48 PM 4/7/2025    11:25 AM 6/16/2025    12:46 PM   PHQ-9 SCORE   PHQ-9  Total Score MyChart 8 (Mild depression) 10 (Moderate depression) 5 (Mild depression) 9 (Mild depression) 8 (Mild depression) 6 (Mild depression) 8 (Mild depression)   PHQ-9 Total Score 8 10 5 9  8  6  8        Patient-reported     GAD7:       6/5/2023     8:30 AM 7/24/2023     8:45 AM 8/28/2023     8:19 AM 9/23/2024     2:53 PM 10/7/2024     4:50 PM 4/7/2025    11:26 AM 6/16/2025    12:47 PM   AYDIN-7 SCORE   Total Score 8 (mild anxiety) 13 (moderate anxiety) 15 (severe anxiety) 7 (mild anxiety) 7 (mild anxiety) 6 (mild anxiety) 12 (moderate anxiety)   Total Score 8 13 15 7 7 6  12        Patient-reported     PROMIS 10-Global Health (only subscores and total score):       2/20/2023     9:13 AM 5/22/2023     9:35 AM 6/5/2023     8:31 AM 9/23/2024     2:54 PM 1/20/2025     2:51 PM 2/3/2025     2:49 PM 6/16/2025    12:48 PM   PROMIS-10 Scores Only   Global Mental Health Score 13 13 13 13 12  12  12    Global Physical Health Score 17 16 17 17 17  17  17    PROMIS TOTAL - SUBSCORES 30 29 30 30 29  29  29        Patient-reported      ASSESSMENT: Current Emotional / Mental Status (status of significant symptoms):   Risk status (Self / Other harm or suicidal ideation)   Patient denies current fears or concerns for personal safety.   Patient denies current or recent suicidal ideation or behaviors.   Patient denies current or recent homicidal ideation or behaviors.   Patient denies current or recent self injurious behavior or ideation.   Patient denies other safety concerns.   Patient reports there has been no change in risk factors since their last session.     Patient reports there has been no change in protective factors since their last session.     Recommended that patient call 911 or go to the local ED should there be a change in any of these risk factors     Appearance:   Appropriate    Eye Contact:   Good    Psychomotor Behavior: Normal    Attitude:   Cooperative  Friendly   Orientation:   All   Speech    Rate /  Production: Normal     Volume:  Normal    Mood:    Anxious  Depressed ,   Affect:    Appropriate    Thought Content:  Clear    Thought Form:  Circumstantial   Insight:    Good      Medication Review:   No changes to current psychiatric medication(s)     Medication Compliance:   Yes     Changes in Health Issues:   None noted     Chemical Use Review:   Substance Use: Chemical use reviewed, no active concerns identified      Tobacco Use: No current tobacco use.      Diagnosis:  1. Major depressive disorder, recurrent episode, mild with anxious distress        Collateral Reports Completed:   Not Applicable    PLAN: (Patient Tasks / Therapist Tasks / Other)  Continue using PATRIZIA, FAST and Check the Facts engage intentionally with supportive people, prioritize family/friend/me  time. Maintain no communication with mother, appropriate boundary with stepdad, other individuals who require it.  Use partner proactive communication to address concerns. [place] kindness meditation, leaves on a stream    DEE Walden LICSW 6/16/2025        _____________________________________________________________  Individual Treatment Plan    Patient's Name: Joey Spears  YOB: 1995    Date of Creation:   9/13/2021  Date Treatment Plan Last Reviewed/Revised: 4/7/25    There has been demonstrated improvement in functioning while patient has been engaged in psychotherapy/psychological service- if withdrawn the patient would deteriorate and/or relapse.     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    Anticipated number of session for this episode of care:    Anticipation frequency of session: Every other week  Anticipated Duration of each session: 38-52 minutes  Treatment plan will be reviewed in 90 days or when goals have been changed.     MeasurableTreatment Goal(s) related to diagnosis / functional impairment(s)  Goal 1: Client will experience an improvement in mood and anxiety evidenced by self report and AYDIN/PHQ scores of 5 or less    I will know I've met my goal when I'm enjoying my life more (paraphrase) .      Objective #A (Client Action)    Client will Increase interest, engagement, and pleasure in doing things  Decrease frequency and intensity of feeling down, depressed, hopeless  Improve quantity and quality of night time sleep / decrease daytime naps  Feel less tired and more energy during the day   Identify negative self-talk and behaviors: challenge core beliefs, myths, and actions  Improve concentration, focus, and mindfulness in daily activities   Feel less fidgety, restless or slow in daily activities / interpersonal interactions  Use boundaries and proactive communication in relationships.  Status:    4/7/2025    Intervention(s)  Therapist will teach behavioral activation, sleep hygiene, CBT, DBT and ACT skills, proactive communication, mindfulness, grounding skills to support mood improvement and anxiety reduction.    Patient has reviewed and agreed to the above plan.      DEE Walden,  LICSW 4/7/2025

## 2025-07-14 ENCOUNTER — VIRTUAL VISIT (OUTPATIENT)
Facility: CLINIC | Age: 30
End: 2025-07-14
Payer: COMMERCIAL

## 2025-07-14 DIAGNOSIS — F33.0 MAJOR DEPRESSIVE DISORDER, RECURRENT EPISODE, MILD WITH ANXIOUS DISTRESS: Primary | ICD-10-CM

## 2025-07-14 PROCEDURE — 90837 PSYTX W PT 60 MINUTES: CPT | Mod: 95

## 2025-07-14 ASSESSMENT — PATIENT HEALTH QUESTIONNAIRE - PHQ9
10. IF YOU CHECKED OFF ANY PROBLEMS, HOW DIFFICULT HAVE THESE PROBLEMS MADE IT FOR YOU TO DO YOUR WORK, TAKE CARE OF THINGS AT HOME, OR GET ALONG WITH OTHER PEOPLE: SOMEWHAT DIFFICULT
SUM OF ALL RESPONSES TO PHQ QUESTIONS 1-9: 4
SUM OF ALL RESPONSES TO PHQ QUESTIONS 1-9: 4

## 2025-07-14 NOTE — PROGRESS NOTES
[facility]                                     Progress Note    Patient Name: Joey Spears  Date:   7/14/2025        Service Type: Individual      Session Start Time: 10:02 am  Session End Time: 10:55 am     Session Length: 53 min    Session #: 73    Attendees: Client attended alone    Service Modality:    Telemedicine Visit: The patient's condition can be safely assessed and treated via synchronous audio and visual telemedicine encounter.      Reason for Telemedicine Visit: Patient has requested telehealth visit    Originating Site (Patient Location): Patient's home    Distant Site (Provider Location): Provider Remote Setting- Home Office    Consent:  The patient/guardian has verbally consented to: the potential risks and benefits of telemedicine (video visit) versus in person care; bill my insurance or make self-payment for services provided; and responsibility for payment of non-covered services.     Mode of Communication:  Video Conference via [employer]    As the provider I attest to compliance with applicable laws and regulations related to telemedicine.    DATA  Extended Session (53+ minutes): PROLONGED SERVICE IN THE OUTPATIENT SETTING REQUIRING DIRECT (FACE-TO-FACE) PATIENT CONTACT BEYOND THE USUAL SERVICE:    - Patient's presenting concerns require more intensive intervention than could be completed within the usual service  Interactive Complexity: No  Crisis: No      Progress Since Last Session (Related to Symptoms / Goals / Homework):  Symptoms: Improving reducing situational stressors   Homework: Achieved / completed to satisfaction      Episode of Care Goals: Satisfactory progress - ACTION (Actively working towards change); Intervened by reinforcing change plan / affirming steps taken     Current / Ongoing Stressors and Concerns:  Sibling (they) ED recovery continuing.  Partner mental health/fx concerns. Communicating needs/expectations. Focus on boundaries, self  care to support wellbeing at work/in personal life. Ongoing No contact with mom, managing contact with stepdad. Continuing focus on positive-productive relationships/activities that support wellness, learning to prioritize personal needs.       Treatment Objective(s) Addressed in This Session:   Use boundaries and proactive communication in relationships.   Improve quantity and quality of night time sleep / decrease daytime naps   Identify negative self-talk and behaviors: challenge core beliefs, myths, and actions  Improve concentration, focus, and mindfulness in daily activities   Feel less fidgety, restless or slow in daily activities / interpersonal interactions    Interventions:  Supportive Therapy: Provided active listening and validation. Endorsed continuing strength, running fitness plan, meditation (leaves on a stream) using stacking  Motivational Interviewing  Target Behavior: continue using  proactive communication skills to get needs met, continue no contact with mom, focusing on healthy and supportive relationships, creating personal time and space.   Stage of Change: ACTION (Actively working towards change)     MI Intervention: Expressed Empathy/Understanding, Supported Autonomy, Collaboration, Evocation, Open-ended questions, Reflections: simple and complex, and Change talk (evoked)     Change Talk Expressed by the Patient: Ability to change Reasons to change Need to change Committment to change Activation Taking steps    Provider Response to Change Talk: E - Evoked more info from patient about behavior change, A - Affirmed patient's thoughts, decisions, or attempts at behavior change, and R - Reflected patient's change talk     Assessments completed prior to visit: 2/15/21 DA  The following assessments were completed by patient for this visit:      PHQ9:       9/3/2024     7:00 AM 9/23/2024     2:52 PM 1/20/2025     2:50 PM 2/3/2025     2:48 PM 4/7/2025    11:25 AM 6/16/2025    12:46 PM 7/14/2025      9:53 AM   PHQ-9 SCORE   PHQ-9 Total Score MyChart 10 (Moderate depression) 5 (Mild depression) 9 (Mild depression) 8 (Mild depression) 6 (Mild depression) 8 (Mild depression) 4 (Minimal depression)   PHQ-9 Total Score 10 5 9  8  6  8  4        Patient-reported     GAD7:       6/5/2023     8:30 AM 7/24/2023     8:45 AM 8/28/2023     8:19 AM 9/23/2024     2:53 PM 10/7/2024     4:50 PM 4/7/2025    11:26 AM 6/16/2025    12:47 PM   AYDIN-7 SCORE   Total Score 8 (mild anxiety) 13 (moderate anxiety) 15 (severe anxiety) 7 (mild anxiety) 7 (mild anxiety) 6 (mild anxiety) 12 (moderate anxiety)   Total Score 8 13 15 7 7 6  12        Patient-reported     PROMIS 10-Global Health (only subscores and total score):       5/22/2023     9:35 AM 6/5/2023     8:31 AM 9/23/2024     2:54 PM 1/20/2025     2:51 PM 2/3/2025     2:49 PM 6/16/2025    12:48 PM 7/14/2025     9:54 AM   PROMIS-10 Scores Only   Global Mental Health Score 13 13 13 12  12  12  14    Global Physical Health Score 16 17 17 17  17  17  17    PROMIS TOTAL - SUBSCORES 29 30 30 29  29  29  31        Patient-reported      ASSESSMENT: Current Emotional / Mental Status (status of significant symptoms):   Risk status (Self / Other harm or suicidal ideation)   Patient denies current fears or concerns for personal safety.   Patient denies current or recent suicidal ideation or behaviors.   Patient denies current or recent homicidal ideation or behaviors.   Patient denies current or recent self injurious behavior or ideation.   Patient denies other safety concerns.   Patient reports there has been no change in risk factors since their last session.     Patient reports there has been no change in protective factors since their last session.     Recommended that patient call 911 or go to the local ED should there be a change in any of these risk factors     Appearance:   Appropriate    Eye Contact:   Good    Psychomotor Behavior: Normal    Attitude:   Cooperative   Friendly   Orientation:   All   Speech    Rate / Production: Normal     Volume:  Normal    Mood:    Anxious  Depressed ,   Affect:    Appropriate    Thought Content:  Clear    Thought Form:  Circumstantial   Insight:    Good      Medication Review:   No changes to current psychiatric medication(s)     Medication Compliance:   Yes     Changes in Health Issues:   None noted     Chemical Use Review:   Substance Use: Chemical use reviewed, no active concerns identified      Tobacco Use: No current tobacco use.      Diagnosis:  No diagnosis found.      Collateral Reports Completed:   Not Applicable    PLAN: (Patient Tasks / Therapist Tasks / Other)  Continue using PATRIZIA, FAST and Check the Facts engage intentionally with supportive people, prioritize family/friend/me  time. Maintain no communication with mother, appropriate boundary with stepdad, other individuals who require it.  Use partner proactive communication to address concerns. [place] kindness meditation, leaves on a stream    DEE Walden [facility] 7/14/2025        _____________________________________________________________  Individual Treatment Plan    Patient's Name: Joey Spears  YOB: 1995    Date of Creation:   9/13/2021  Date Treatment Plan Last Reviewed/Revised: 4/7/25    There has been demonstrated improvement in functioning while patient has been engaged in psychotherapy/psychological service- if withdrawn the patient would deteriorate and/or relapse.     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    Anticipated number of session for this episode of care:   Anticipation  frequency of session: Every other week  Anticipated Duration of each session: 38-52 minutes  Treatment plan will be reviewed in 90 days or when goals have been changed.     MeasurableTreatment Goal(s) related to diagnosis / functional impairment(s)  Goal 1: Client will experience an improvement in mood and anxiety evidenced by self report and AYDIN/PHQ scores of 5 or less    I will know I've met my goal when I'm enjoying my life more (paraphrase) .      Objective #A (Client Action)    Client will Increase interest, engagement, and pleasure in doing things  Decrease frequency and intensity of feeling down, depressed, hopeless  Improve quantity and quality of night time sleep / decrease daytime naps  Feel less tired and more energy during the day   Identify negative self-talk and behaviors: challenge core beliefs, myths, and actions  Improve concentration, focus, and mindfulness in daily activities   Feel less fidgety, restless or slow in daily activities / interpersonal interactions  Use boundaries and proactive communication in relationships.  Status:    4/7/2025    Intervention(s)  Therapist will teach behavioral activation, sleep hygiene, CBT, DBT and ACT skills, proactive communication, mindfulness, grounding skills to support mood improvement and anxiety reduction.    Patient has reviewed and agreed to the above plan.      DEE Walden,  LICSW 4/7/2025

## 2025-07-28 ENCOUNTER — VIRTUAL VISIT (OUTPATIENT)
Facility: CLINIC | Age: 30
End: 2025-07-28
Payer: COMMERCIAL

## 2025-07-28 DIAGNOSIS — F33.0 MAJOR DEPRESSIVE DISORDER, RECURRENT EPISODE, MILD WITH ANXIOUS DISTRESS: Primary | ICD-10-CM

## 2025-07-28 PROCEDURE — 90837 PSYTX W PT 60 MINUTES: CPT | Mod: 95

## 2025-07-28 ASSESSMENT — PATIENT HEALTH QUESTIONNAIRE - PHQ9
SUM OF ALL RESPONSES TO PHQ QUESTIONS 1-9: 6
SUM OF ALL RESPONSES TO PHQ QUESTIONS 1-9: 6
10. IF YOU CHECKED OFF ANY PROBLEMS, HOW DIFFICULT HAVE THESE PROBLEMS MADE IT FOR YOU TO DO YOUR WORK, TAKE CARE OF THINGS AT HOME, OR GET ALONG WITH OTHER PEOPLE: SOMEWHAT DIFFICULT

## 2025-07-28 NOTE — PROGRESS NOTES
[facility]                                     Progress Note    Patient Name: Joey Spears  Date:   7/28/2025        Service Type: Individual      Session Start Time: 12:02 am  Session End Time: 12:55 am     Session Length: 53 min    Session #: 74    Attendees: Client attended alone    Service Modality:    Telemedicine Visit: The patient's condition can be safely assessed and treated via synchronous audio and visual telemedicine encounter.      Reason for Telemedicine Visit: Patient has requested telehealth visit    Originating Site (Patient Location): Patient's home    Distant Site (Provider Location): Provider Remote Setting- Home Office    Consent:  The patient/guardian has verbally consented to: the potential risks and benefits of telemedicine (video visit) versus in person care; bill my insurance or make self-payment for services provided; and responsibility for payment of non-covered services.     Mode of Communication:  Video Conference via [employer]    As the provider I attest to compliance with applicable laws and regulations related to telemedicine.    DATA  Extended Session (53+ minutes): PROLONGED SERVICE IN THE OUTPATIENT SETTING REQUIRING DIRECT (FACE-TO-FACE) PATIENT CONTACT BEYOND THE USUAL SERVICE:    - Patient's presenting concerns require more intensive intervention than could be completed within the usual service  Interactive Complexity: No  Crisis: No      Progress Since Last Session (Related to Symptoms / Goals / Homework):  Symptoms: Improving reducing situational stressors   Homework: Achieved / completed to satisfaction      Episode of Care Goals: Satisfactory progress - ACTION (Actively working towards change); Intervened by reinforcing change plan / affirming steps taken     Current / Ongoing Stressors and Concerns:  Sibling (they) ED recovery continuing.  Partner mental health/fx concerns. Communicating needs/expectations. Focus on boundaries, self  care to support wellbeing at work/in personal life. Ongoing No contact with mom, managing contact with stepdad. Continuing focus on positive-productive relationships/activities that support wellness, learning to prioritize personal needs.       Treatment Objective(s) Addressed in This Session:   Use boundaries and proactive communication in relationships.   Improve quantity and quality of night time sleep / decrease daytime naps   Identify negative self-talk and behaviors: challenge core beliefs, myths, and actions  Improve concentration, focus, and mindfulness in daily activities   Feel less fidgety, restless or slow in daily activities / interpersonal interactions    Interventions:  Supportive Therapy: Provided active listening and validation. Endorsed continuing strength, running fitness plan, meditation (leaves on a stream) using behavior stacking. Surfaced prioritization of intentional kindness, meaningful time with partner; conceptualized strategies  Motivational Interviewing  Target Behavior: continue using  proactive communication skills to get needs met, continue no contact with mom, focusing on healthy and supportive relationships, creating personal time and space.   Stage of Change: ACTION (Actively working towards change)     MI Intervention: Expressed Empathy/Understanding, Supported Autonomy, Collaboration, Evocation, Open-ended questions, Reflections: simple and complex, and Change talk (evoked)     Change Talk Expressed by the Patient: Ability to change Reasons to change Need to change Committment to change Activation Taking steps    Provider Response to Change Talk: E - Evoked more info from patient about behavior change, A - Affirmed patient's thoughts, decisions, or attempts at behavior change, and R - Reflected patient's change talk     Assessments completed prior to visit: 2/15/21 DA  The following assessments were completed by patient for this visit:      PHQ9:       9/23/2024     2:52 PM  1/20/2025     2:50 PM 2/3/2025     2:48 PM 4/7/2025    11:25 AM 6/16/2025    12:46 PM 7/14/2025     9:53 AM 7/28/2025    11:54 AM   PHQ-9 SCORE   PHQ-9 Total Score MyChart 5 (Mild depression) 9 (Mild depression) 8 (Mild depression) 6 (Mild depression) 8 (Mild depression) 4 (Minimal depression) 6 (Mild depression)   PHQ-9 Total Score 5 9  8  6  8  4  6        Patient-reported     GAD7:       6/5/2023     8:30 AM 7/24/2023     8:45 AM 8/28/2023     8:19 AM 9/23/2024     2:53 PM 10/7/2024     4:50 PM 4/7/2025    11:26 AM 6/16/2025    12:47 PM   AYDIN-7 SCORE   Total Score 8 (mild anxiety) 13 (moderate anxiety) 15 (severe anxiety) 7 (mild anxiety) 7 (mild anxiety) 6 (mild anxiety) 12 (moderate anxiety)   Total Score 8 13 15 7 7 6  12        Patient-reported     PROMIS 10-Global Health (only subscores and total score):       6/5/2023     8:31 AM 9/23/2024     2:54 PM 1/20/2025     2:51 PM 2/3/2025     2:49 PM 6/16/2025    12:48 PM 7/14/2025     9:54 AM 7/28/2025    11:54 AM   PROMIS-10 Scores Only   Global Mental Health Score 13 13 12  12  12  14  14    Global Physical Health Score 17 17 17  17  17  17  17    PROMIS TOTAL - SUBSCORES 30 30 29  29  29  31  31        Patient-reported      ASSESSMENT: Current Emotional / Mental Status (status of significant symptoms):   Risk status (Self / Other harm or suicidal ideation)   Patient denies current fears or concerns for personal safety.   Patient denies current or recent suicidal ideation or behaviors.   Patient denies current or recent homicidal ideation or behaviors.   Patient denies current or recent self injurious behavior or ideation.   Patient denies other safety concerns.   Patient reports there has been no change in risk factors since their last session.     Patient reports there has been no change in protective factors since their last session.     Recommended that patient call 911 or go to the local ED should there be a change in any of these risk  factors     Appearance:   Appropriate    Eye Contact:   Good    Psychomotor Behavior: Normal    Attitude:   Cooperative  Friendly   Orientation:   All   Speech    Rate / Production: Normal     Volume:  Normal    Mood:    Anxious  Depressed ,   Affect:    Appropriate    Thought Content:  Clear    Thought Form:  Circumstantial   Insight:    Good      Medication Review:   No changes to current psychiatric medication(s)     Medication Compliance:   Yes     Changes in Health Issues:   None noted     Chemical Use Review:   Substance Use: Chemical use reviewed, no active concerns identified      Tobacco Use: No current tobacco use.      Diagnosis:  No diagnosis found.      Collateral Reports Completed:   Not Applicable    PLAN: (Patient Tasks / Therapist Tasks / Other)  Continue using PATRIZIA, FAST and Check the Facts engage intentionally with supportive people, prioritize family/friend/me  time. Maintain no communication with mother, appropriate boundary with stepdad, other individuals who require it.  Use partner proactive communication to address concerns. [place] kindness meditation, leaves on a stream    DEE Walden 7/28/2025        _____________________________________________________________  Individual Treatment Plan    Patient's Name: Joey Spears  YOB: 1995    Date of Creation:   9/13/2021  Date Treatment Plan Last Reviewed/Revised: 4/7/25    There has been demonstrated improvement in functioning while patient has been engaged in psychotherapy/psychological service- if withdrawn the patient would deteriorate and/or relapse.     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    Anticipated number of session for this episode of care:   Anticipation frequency of session: Every other week  Anticipated Duration of each session: 38-52 minutes  Treatment plan will be reviewed in 90 days or when goals have been changed.     MeasurableTreatment Goal(s) related to diagnosis / functional impairment(s)  Goal 1: Client will experience an improvement in mood and anxiety evidenced by self report and AYDIN/PHQ scores of 5 or less    I will know I've met my goal when I'm enjoying my life more (paraphrase) .      Objective #A (Client Action)    Client will Increase interest, engagement, and pleasure in doing things  Decrease frequency and intensity of feeling down, depressed, hopeless  Improve quantity and quality of night time sleep / decrease daytime naps  Feel less tired and more energy during the day   Identify negative self-talk and behaviors: challenge core beliefs, myths, and actions  Improve concentration, focus, and mindfulness in daily activities   Feel less fidgety, restless or slow in daily activities / interpersonal interactions  Use boundaries and proactive communication in relationships.  Status:    4/7/2025    Intervention(s)  Therapist will teach behavioral activation, sleep hygiene, CBT, DBT and ACT skills, proactive communication, mindfulness, grounding skills to support mood improvement and anxiety reduction.    Patient has reviewed and agreed to the above plan.      DEE Walden,  LICSW 4/7/2025

## 2025-08-11 ENCOUNTER — VIRTUAL VISIT (OUTPATIENT)
Facility: CLINIC | Age: 30
End: 2025-08-11
Payer: COMMERCIAL

## 2025-08-11 DIAGNOSIS — F33.0 MAJOR DEPRESSIVE DISORDER, RECURRENT EPISODE, MILD WITH ANXIOUS DISTRESS: Primary | ICD-10-CM

## 2025-08-11 PROCEDURE — 90837 PSYTX W PT 60 MINUTES: CPT | Mod: 95
